# Patient Record
Sex: FEMALE | Race: WHITE | NOT HISPANIC OR LATINO | Employment: OTHER | URBAN - METROPOLITAN AREA
[De-identification: names, ages, dates, MRNs, and addresses within clinical notes are randomized per-mention and may not be internally consistent; named-entity substitution may affect disease eponyms.]

---

## 2017-01-04 ENCOUNTER — HOSPITAL ENCOUNTER (OUTPATIENT)
Dept: CT IMAGING | Facility: HOSPITAL | Age: 82
Discharge: HOME | End: 2017-01-04
Attending: UROLOGY

## 2017-01-04 DIAGNOSIS — N20.0 CALCULUS OF KIDNEY: ICD-10-CM

## 2017-01-23 ENCOUNTER — OFFICE VISIT - HEALTHEAST (OUTPATIENT)
Dept: INTERNAL MEDICINE | Facility: CLINIC | Age: 82
End: 2017-01-23

## 2017-01-23 ENCOUNTER — AMBULATORY - HEALTHEAST (OUTPATIENT)
Dept: INTERNAL MEDICINE | Facility: CLINIC | Age: 82
End: 2017-01-23

## 2017-01-23 DIAGNOSIS — S00.83XA: ICD-10-CM
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
## 2017-01-23 ASSESSMENT — MIFFLIN-ST. JEOR: SCORE: 1295.65

## 2017-02-02 ENCOUNTER — COMMUNICATION - HEALTHEAST (OUTPATIENT)
Dept: INTERNAL MEDICINE | Facility: CLINIC | Age: 82
End: 2017-02-02

## 2017-02-02 ENCOUNTER — RECORDS - HEALTHEAST (OUTPATIENT)
Dept: ADMINISTRATIVE | Facility: OTHER | Age: 82
End: 2017-02-02

## 2017-02-15 ENCOUNTER — RECORDS - HEALTHEAST (OUTPATIENT)
Dept: ADMINISTRATIVE | Facility: OTHER | Age: 82
End: 2017-02-15

## 2017-03-03 ENCOUNTER — COMMUNICATION - HEALTHEAST (OUTPATIENT)
Dept: INTERNAL MEDICINE | Facility: CLINIC | Age: 82
End: 2017-03-03

## 2017-04-03 ENCOUNTER — OFFICE VISIT - HEALTHEAST (OUTPATIENT)
Dept: INTERNAL MEDICINE | Facility: CLINIC | Age: 82
End: 2017-04-03

## 2017-04-03 DIAGNOSIS — N18.4 CKD (CHRONIC KIDNEY DISEASE) STAGE 4, GFR 15-29 ML/MIN (H): ICD-10-CM

## 2017-04-03 DIAGNOSIS — I10 ESSENTIAL HYPERTENSION WITH GOAL BLOOD PRESSURE LESS THAN 140/90: ICD-10-CM

## 2017-04-03 DIAGNOSIS — D64.9 ANEMIA, UNSPECIFIED: ICD-10-CM

## 2017-04-03 ASSESSMENT — MIFFLIN-ST. JEOR: SCORE: 1341.01

## 2017-04-05 ENCOUNTER — COMMUNICATION - HEALTHEAST (OUTPATIENT)
Dept: INTERNAL MEDICINE | Facility: CLINIC | Age: 82
End: 2017-04-05

## 2017-04-13 ENCOUNTER — RECORDS - HEALTHEAST (OUTPATIENT)
Dept: ADMINISTRATIVE | Facility: OTHER | Age: 82
End: 2017-04-13

## 2017-04-19 ENCOUNTER — RECORDS - HEALTHEAST (OUTPATIENT)
Dept: ADMINISTRATIVE | Facility: OTHER | Age: 82
End: 2017-04-19

## 2017-05-01 ENCOUNTER — COMMUNICATION - HEALTHEAST (OUTPATIENT)
Dept: INTERNAL MEDICINE | Facility: CLINIC | Age: 82
End: 2017-05-01

## 2017-07-19 ENCOUNTER — RECORDS - HEALTHEAST (OUTPATIENT)
Dept: ADMINISTRATIVE | Facility: OTHER | Age: 82
End: 2017-07-19

## 2017-07-25 ENCOUNTER — COMMUNICATION - HEALTHEAST (OUTPATIENT)
Dept: SCHEDULING | Facility: CLINIC | Age: 82
End: 2017-07-25

## 2017-07-28 ENCOUNTER — OFFICE VISIT - HEALTHEAST (OUTPATIENT)
Dept: INTERNAL MEDICINE | Facility: CLINIC | Age: 82
End: 2017-07-28

## 2017-07-28 DIAGNOSIS — T14.8XXA HEMATOMA: ICD-10-CM

## 2017-07-28 DIAGNOSIS — S22.39XA RIB FRACTURE: ICD-10-CM

## 2017-07-28 ASSESSMENT — MIFFLIN-ST. JEOR: SCORE: 1310.4

## 2017-08-03 ENCOUNTER — RECORDS - HEALTHEAST (OUTPATIENT)
Dept: ADMINISTRATIVE | Facility: OTHER | Age: 82
End: 2017-08-03

## 2017-08-07 ENCOUNTER — RECORDS - HEALTHEAST (OUTPATIENT)
Dept: ADMINISTRATIVE | Facility: OTHER | Age: 82
End: 2017-08-07

## 2017-10-12 ENCOUNTER — OFFICE VISIT - HEALTHEAST (OUTPATIENT)
Dept: INTERNAL MEDICINE | Facility: CLINIC | Age: 82
End: 2017-10-12

## 2017-10-12 DIAGNOSIS — N18.4 CKD (CHRONIC KIDNEY DISEASE) STAGE 4, GFR 15-29 ML/MIN (H): ICD-10-CM

## 2017-10-12 DIAGNOSIS — N20.0 RENAL STONES: ICD-10-CM

## 2017-10-12 DIAGNOSIS — Z23 NEED FOR VACCINATION: ICD-10-CM

## 2017-10-12 DIAGNOSIS — I10 ESSENTIAL HYPERTENSION: ICD-10-CM

## 2017-10-12 ASSESSMENT — MIFFLIN-ST. JEOR: SCORE: 1296.79

## 2017-10-13 ENCOUNTER — COMMUNICATION - HEALTHEAST (OUTPATIENT)
Dept: INTERNAL MEDICINE | Facility: CLINIC | Age: 82
End: 2017-10-13

## 2017-10-13 DIAGNOSIS — N18.4 CKD (CHRONIC KIDNEY DISEASE) STAGE 4, GFR 15-29 ML/MIN (H): ICD-10-CM

## 2017-10-26 ENCOUNTER — RECORDS - HEALTHEAST (OUTPATIENT)
Dept: ADMINISTRATIVE | Facility: OTHER | Age: 82
End: 2017-10-26

## 2017-11-09 ENCOUNTER — COMMUNICATION - HEALTHEAST (OUTPATIENT)
Dept: INTERNAL MEDICINE | Facility: CLINIC | Age: 82
End: 2017-11-09

## 2017-11-09 DIAGNOSIS — E78.00 PURE HYPERCHOLESTEROLEMIA: ICD-10-CM

## 2017-12-06 ENCOUNTER — RECORDS - HEALTHEAST (OUTPATIENT)
Dept: ADMINISTRATIVE | Facility: OTHER | Age: 82
End: 2017-12-06

## 2017-12-27 ENCOUNTER — OFFICE VISIT - HEALTHEAST (OUTPATIENT)
Dept: INTERNAL MEDICINE | Facility: CLINIC | Age: 82
End: 2017-12-27

## 2017-12-27 ENCOUNTER — COMMUNICATION - HEALTHEAST (OUTPATIENT)
Dept: SCHEDULING | Facility: CLINIC | Age: 82
End: 2017-12-27

## 2017-12-27 DIAGNOSIS — T14.8XXA HEMATOMA: ICD-10-CM

## 2017-12-27 ASSESSMENT — MIFFLIN-ST. JEOR: SCORE: 1314.93

## 2018-01-18 ENCOUNTER — OFFICE VISIT - HEALTHEAST (OUTPATIENT)
Dept: INTERNAL MEDICINE | Facility: CLINIC | Age: 83
End: 2018-01-18

## 2018-01-18 DIAGNOSIS — J06.9 URI (UPPER RESPIRATORY INFECTION): ICD-10-CM

## 2018-01-18 ASSESSMENT — MIFFLIN-ST. JEOR: SCORE: 1305.86

## 2018-02-21 ENCOUNTER — RECORDS - HEALTHEAST (OUTPATIENT)
Dept: ADMINISTRATIVE | Facility: OTHER | Age: 83
End: 2018-02-21

## 2018-02-28 ENCOUNTER — COMMUNICATION - HEALTHEAST (OUTPATIENT)
Dept: INTERNAL MEDICINE | Facility: CLINIC | Age: 83
End: 2018-02-28

## 2018-03-29 ENCOUNTER — OFFICE VISIT - HEALTHEAST (OUTPATIENT)
Dept: INTERNAL MEDICINE | Facility: CLINIC | Age: 83
End: 2018-03-29

## 2018-03-29 DIAGNOSIS — N18.4 CKD (CHRONIC KIDNEY DISEASE) STAGE 4, GFR 15-29 ML/MIN (H): ICD-10-CM

## 2018-03-29 DIAGNOSIS — E78.00 PURE HYPERCHOLESTEROLEMIA: ICD-10-CM

## 2018-03-29 DIAGNOSIS — M79.645 FINGER PAIN, LEFT: ICD-10-CM

## 2018-03-29 DIAGNOSIS — I10 ESSENTIAL HYPERTENSION: ICD-10-CM

## 2018-03-29 LAB
ERYTHROCYTE [DISTWIDTH] IN BLOOD BY AUTOMATED COUNT: 13.2 % (ref 11–14.5)
HCT VFR BLD AUTO: 35.8 % (ref 35–47)
HGB BLD-MCNC: 11.6 G/DL (ref 12–16)
MCH RBC QN AUTO: 29.7 PG (ref 27–34)
MCHC RBC AUTO-ENTMCNC: 32.5 G/DL (ref 32–36)
MCV RBC AUTO: 91 FL (ref 80–100)
PLATELET # BLD AUTO: 211 THOU/UL (ref 140–440)
PMV BLD AUTO: 6.3 FL (ref 7–10)
RBC # BLD AUTO: 3.92 MILL/UL (ref 3.8–5.4)
WBC: 4.6 THOU/UL (ref 4–11)

## 2018-03-29 ASSESSMENT — MIFFLIN-ST. JEOR: SCORE: 1342.15

## 2018-03-30 LAB
ALBUMIN SERPL-MCNC: 3.4 G/DL (ref 3.5–5)
ANION GAP SERPL CALCULATED.3IONS-SCNC: 10 MMOL/L (ref 5–18)
BUN SERPL-MCNC: 31 MG/DL (ref 8–28)
CALCIUM SERPL-MCNC: 8.9 MG/DL (ref 8.5–10.5)
CHLORIDE BLD-SCNC: 109 MMOL/L (ref 98–107)
CO2 SERPL-SCNC: 21 MMOL/L (ref 22–31)
CREAT SERPL-MCNC: 2.17 MG/DL (ref 0.6–1.1)
FERRITIN SERPL-MCNC: 173 NG/ML (ref 10–130)
GFR SERPL CREATININE-BSD FRML MDRD: 22 ML/MIN/1.73M2
GLUCOSE BLD-MCNC: 85 MG/DL (ref 70–125)
IRON SATN MFR SERPL: 20 % (ref 20–50)
IRON SERPL-MCNC: 57 UG/DL (ref 42–175)
PHOSPHATE SERPL-MCNC: 4.1 MG/DL (ref 2.5–4.5)
POTASSIUM BLD-SCNC: 4.9 MMOL/L (ref 3.5–5)
PTH-INTACT SERPL-MCNC: 112 PG/ML (ref 10–86)
SODIUM SERPL-SCNC: 140 MMOL/L (ref 136–145)
TIBC SERPL-MCNC: 284 UG/DL (ref 313–563)
TRANSFERRIN SERPL-MCNC: 227 MG/DL (ref 212–360)

## 2018-04-02 ENCOUNTER — RECORDS - HEALTHEAST (OUTPATIENT)
Dept: ADMINISTRATIVE | Facility: OTHER | Age: 83
End: 2018-04-02

## 2018-04-08 ENCOUNTER — RECORDS - HEALTHEAST (OUTPATIENT)
Dept: ADMINISTRATIVE | Facility: OTHER | Age: 83
End: 2018-04-08

## 2018-04-17 ENCOUNTER — RECORDS - HEALTHEAST (OUTPATIENT)
Dept: ADMINISTRATIVE | Facility: OTHER | Age: 83
End: 2018-04-17

## 2018-05-01 ENCOUNTER — COMMUNICATION - HEALTHEAST (OUTPATIENT)
Dept: INTERNAL MEDICINE | Facility: CLINIC | Age: 83
End: 2018-05-01

## 2018-05-01 DIAGNOSIS — E03.9 HYPOTHYROIDISM: ICD-10-CM

## 2018-05-31 ENCOUNTER — COMMUNICATION - HEALTHEAST (OUTPATIENT)
Dept: INTERNAL MEDICINE | Facility: CLINIC | Age: 83
End: 2018-05-31

## 2018-05-31 DIAGNOSIS — I10 ESSENTIAL HYPERTENSION: ICD-10-CM

## 2018-08-02 ENCOUNTER — COMMUNICATION - HEALTHEAST (OUTPATIENT)
Dept: INTERNAL MEDICINE | Facility: CLINIC | Age: 83
End: 2018-08-02

## 2018-08-02 DIAGNOSIS — E03.9 HYPOTHYROIDISM: ICD-10-CM

## 2018-09-18 ENCOUNTER — RECORDS - HEALTHEAST (OUTPATIENT)
Dept: ADMINISTRATIVE | Facility: OTHER | Age: 83
End: 2018-09-18

## 2018-09-24 ENCOUNTER — RECORDS - HEALTHEAST (OUTPATIENT)
Dept: ADMINISTRATIVE | Facility: OTHER | Age: 83
End: 2018-09-24

## 2018-10-01 ENCOUNTER — AMBULATORY - HEALTHEAST (OUTPATIENT)
Dept: LAB | Facility: CLINIC | Age: 83
End: 2018-10-01

## 2018-10-01 DIAGNOSIS — N18.9 ANEMIA OF CHRONIC RENAL FAILURE: ICD-10-CM

## 2018-10-01 DIAGNOSIS — N13.30 HYDRONEPHROSIS: ICD-10-CM

## 2018-10-01 DIAGNOSIS — N20.0 URIC ACID NEPHROLITHIASIS: ICD-10-CM

## 2018-10-01 DIAGNOSIS — I12.9 PARENCHYMAL RENAL HYPERTENSION: ICD-10-CM

## 2018-10-01 DIAGNOSIS — N18.4 CHRONIC KIDNEY DISEASE, STAGE IV (SEVERE) (H): ICD-10-CM

## 2018-10-01 DIAGNOSIS — D63.1 ANEMIA OF CHRONIC RENAL FAILURE: ICD-10-CM

## 2018-10-01 LAB
ALBUMIN SERPL-MCNC: 3.7 G/DL (ref 3.5–5)
ANION GAP SERPL CALCULATED.3IONS-SCNC: 11 MMOL/L (ref 5–18)
BUN SERPL-MCNC: 35 MG/DL (ref 8–28)
CALCIUM SERPL-MCNC: 9.6 MG/DL (ref 8.5–10.5)
CHLORIDE BLD-SCNC: 112 MMOL/L (ref 98–107)
CO2 SERPL-SCNC: 20 MMOL/L (ref 22–31)
CREAT SERPL-MCNC: 2.16 MG/DL (ref 0.6–1.1)
ERYTHROCYTE [DISTWIDTH] IN BLOOD BY AUTOMATED COUNT: 13 % (ref 11–14.5)
FERRITIN SERPL-MCNC: 256 NG/ML (ref 10–130)
GFR SERPL CREATININE-BSD FRML MDRD: 22 ML/MIN/1.73M2
GLUCOSE BLD-MCNC: 97 MG/DL (ref 70–125)
HCT VFR BLD AUTO: 36.4 % (ref 35–47)
HGB BLD-MCNC: 11.8 G/DL (ref 12–16)
IRON SATN MFR SERPL: 23 % (ref 20–50)
IRON SERPL-MCNC: 59 UG/DL (ref 42–175)
MAGNESIUM SERPL-MCNC: 2.3 MG/DL (ref 1.8–2.6)
MCH RBC QN AUTO: 29.9 PG (ref 27–34)
MCHC RBC AUTO-ENTMCNC: 32.5 G/DL (ref 32–36)
MCV RBC AUTO: 92 FL (ref 80–100)
PHOSPHATE SERPL-MCNC: 4.1 MG/DL (ref 2.5–4.5)
PLATELET # BLD AUTO: 274 THOU/UL (ref 140–440)
PMV BLD AUTO: 6.5 FL (ref 7–10)
POTASSIUM BLD-SCNC: 4.7 MMOL/L (ref 3.5–5)
PTH-INTACT SERPL-MCNC: 132 PG/ML (ref 10–86)
RBC # BLD AUTO: 3.95 MILL/UL (ref 3.8–5.4)
SODIUM SERPL-SCNC: 143 MMOL/L (ref 136–145)
TIBC SERPL-MCNC: 259 UG/DL (ref 313–563)
TRANSFERRIN SERPL-MCNC: 208 MG/DL (ref 212–360)
WBC: 4.4 THOU/UL (ref 4–11)

## 2018-10-09 ENCOUNTER — RECORDS - HEALTHEAST (OUTPATIENT)
Dept: ADMINISTRATIVE | Facility: OTHER | Age: 83
End: 2018-10-09

## 2018-10-19 ENCOUNTER — COMMUNICATION - HEALTHEAST (OUTPATIENT)
Dept: INTERNAL MEDICINE | Facility: CLINIC | Age: 83
End: 2018-10-19

## 2018-10-19 DIAGNOSIS — E03.9 HYPOTHYROIDISM: ICD-10-CM

## 2018-11-12 ENCOUNTER — OFFICE VISIT - HEALTHEAST (OUTPATIENT)
Dept: INTERNAL MEDICINE | Facility: CLINIC | Age: 83
End: 2018-11-12

## 2018-11-12 ENCOUNTER — COMMUNICATION - HEALTHEAST (OUTPATIENT)
Dept: INTERNAL MEDICINE | Facility: CLINIC | Age: 83
End: 2018-11-12

## 2018-11-12 DIAGNOSIS — M25.511 ACUTE PAIN OF RIGHT SHOULDER: ICD-10-CM

## 2018-11-12 ASSESSMENT — MIFFLIN-ST. JEOR: SCORE: 1283.18

## 2018-11-19 ENCOUNTER — COMMUNICATION - HEALTHEAST (OUTPATIENT)
Dept: INTERNAL MEDICINE | Facility: CLINIC | Age: 83
End: 2018-11-19

## 2018-11-26 ENCOUNTER — OFFICE VISIT - HEALTHEAST (OUTPATIENT)
Dept: INTERNAL MEDICINE | Facility: CLINIC | Age: 83
End: 2018-11-26

## 2018-11-26 ENCOUNTER — HOSPITAL ENCOUNTER (OUTPATIENT)
Dept: ULTRASOUND IMAGING | Facility: CLINIC | Age: 83
Discharge: HOME | End: 2018-11-26
Attending: INTERNAL MEDICINE

## 2018-11-26 DIAGNOSIS — R60.0 LEG EDEMA, RIGHT: ICD-10-CM

## 2018-11-26 DIAGNOSIS — M25.511 RIGHT SHOULDER PAIN, UNSPECIFIED CHRONICITY: ICD-10-CM

## 2018-11-26 DIAGNOSIS — Z23 NEED FOR IMMUNIZATION AGAINST INFLUENZA: ICD-10-CM

## 2018-11-26 ASSESSMENT — MIFFLIN-ST. JEOR: SCORE: 1283.18

## 2019-01-16 ENCOUNTER — COMMUNICATION - HEALTHEAST (OUTPATIENT)
Dept: INTERNAL MEDICINE | Facility: CLINIC | Age: 84
End: 2019-01-16

## 2019-01-16 DIAGNOSIS — E03.9 HYPOTHYROIDISM: ICD-10-CM

## 2019-03-14 ENCOUNTER — COMMUNICATION - HEALTHEAST (OUTPATIENT)
Dept: INTERNAL MEDICINE | Facility: CLINIC | Age: 84
End: 2019-03-14

## 2019-03-14 DIAGNOSIS — I10 ESSENTIAL HYPERTENSION: ICD-10-CM

## 2019-03-18 ENCOUNTER — AMBULATORY - HEALTHEAST (OUTPATIENT)
Dept: LAB | Facility: CLINIC | Age: 84
End: 2019-03-18

## 2019-03-18 DIAGNOSIS — N20.0 RENAL STONES: ICD-10-CM

## 2019-03-18 DIAGNOSIS — N25.81 SECONDARY HYPERPARATHYROIDISM OF RENAL ORIGIN (H): ICD-10-CM

## 2019-03-18 DIAGNOSIS — N18.4 CKD (CHRONIC KIDNEY DISEASE) STAGE 4, GFR 15-29 ML/MIN (H): ICD-10-CM

## 2019-03-18 DIAGNOSIS — D64.9 ANEMIA, UNSPECIFIED: ICD-10-CM

## 2019-04-17 ENCOUNTER — COMMUNICATION - HEALTHEAST (OUTPATIENT)
Dept: INTERNAL MEDICINE | Facility: CLINIC | Age: 84
End: 2019-04-17

## 2019-04-17 ENCOUNTER — RECORDS - HEALTHEAST (OUTPATIENT)
Dept: ADMINISTRATIVE | Facility: OTHER | Age: 84
End: 2019-04-17

## 2019-04-17 DIAGNOSIS — E78.00 PURE HYPERCHOLESTEROLEMIA: ICD-10-CM

## 2019-04-17 DIAGNOSIS — I10 ESSENTIAL HYPERTENSION: ICD-10-CM

## 2019-04-17 DIAGNOSIS — M79.645 FINGER PAIN, LEFT: ICD-10-CM

## 2019-04-17 DIAGNOSIS — N18.4 CKD (CHRONIC KIDNEY DISEASE) STAGE 4, GFR 15-29 ML/MIN (H): ICD-10-CM

## 2019-04-29 ENCOUNTER — COMMUNICATION - HEALTHEAST (OUTPATIENT)
Dept: INTERNAL MEDICINE | Facility: CLINIC | Age: 84
End: 2019-04-29

## 2019-04-29 DIAGNOSIS — E03.9 HYPOTHYROIDISM: ICD-10-CM

## 2019-07-01 ENCOUNTER — OFFICE VISIT - HEALTHEAST (OUTPATIENT)
Dept: INTERNAL MEDICINE | Facility: CLINIC | Age: 84
End: 2019-07-01

## 2019-07-01 DIAGNOSIS — M15.9 OSTEOARTHRITIS OF MULTIPLE JOINTS, UNSPECIFIED OSTEOARTHRITIS TYPE: ICD-10-CM

## 2019-07-01 DIAGNOSIS — N18.4 CKD (CHRONIC KIDNEY DISEASE) STAGE 4, GFR 15-29 ML/MIN (H): ICD-10-CM

## 2019-07-01 DIAGNOSIS — I10 ESSENTIAL HYPERTENSION: ICD-10-CM

## 2019-07-01 ASSESSMENT — MIFFLIN-ST. JEOR: SCORE: 1269.57

## 2019-08-05 ENCOUNTER — COMMUNICATION - HEALTHEAST (OUTPATIENT)
Dept: INTERNAL MEDICINE | Facility: CLINIC | Age: 84
End: 2019-08-05

## 2019-08-05 DIAGNOSIS — N18.4 CKD (CHRONIC KIDNEY DISEASE) STAGE 4, GFR 15-29 ML/MIN (H): ICD-10-CM

## 2019-08-05 DIAGNOSIS — M79.645 FINGER PAIN, LEFT: ICD-10-CM

## 2019-08-05 DIAGNOSIS — E78.00 PURE HYPERCHOLESTEROLEMIA: ICD-10-CM

## 2019-08-05 DIAGNOSIS — I10 ESSENTIAL HYPERTENSION: ICD-10-CM

## 2019-09-09 ENCOUNTER — RECORDS - HEALTHEAST (OUTPATIENT)
Dept: ADMINISTRATIVE | Facility: OTHER | Age: 84
End: 2019-09-09

## 2019-10-31 ENCOUNTER — RECORDS - HEALTHEAST (OUTPATIENT)
Dept: ADMINISTRATIVE | Facility: OTHER | Age: 84
End: 2019-10-31

## 2020-03-26 ENCOUNTER — RECORDS - HEALTHEAST (OUTPATIENT)
Dept: ADMINISTRATIVE | Facility: OTHER | Age: 85
End: 2020-03-26

## 2020-04-20 ENCOUNTER — COMMUNICATION - HEALTHEAST (OUTPATIENT)
Dept: INTERNAL MEDICINE | Facility: CLINIC | Age: 85
End: 2020-04-20

## 2020-04-20 ENCOUNTER — OFFICE VISIT - HEALTHEAST (OUTPATIENT)
Dept: INTERNAL MEDICINE | Facility: CLINIC | Age: 85
End: 2020-04-20

## 2020-04-20 DIAGNOSIS — N18.4 CKD (CHRONIC KIDNEY DISEASE) STAGE 4, GFR 15-29 ML/MIN (H): ICD-10-CM

## 2020-04-20 DIAGNOSIS — I10 ESSENTIAL HYPERTENSION: ICD-10-CM
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
## 2020-04-21 ENCOUNTER — COMMUNICATION - HEALTHEAST (OUTPATIENT)
Dept: SCHEDULING | Facility: CLINIC | Age: 85
End: 2020-04-21

## 2020-04-29 ENCOUNTER — RECORDS - HEALTHEAST (OUTPATIENT)
Dept: ADMINISTRATIVE | Facility: OTHER | Age: 85
End: 2020-04-29

## 2020-05-08 ENCOUNTER — RECORDS - HEALTHEAST (OUTPATIENT)
Dept: ADMINISTRATIVE | Facility: OTHER | Age: 85
End: 2020-05-08

## 2020-05-13 ENCOUNTER — RECORDS - HEALTHEAST (OUTPATIENT)
Dept: ADMINISTRATIVE | Facility: OTHER | Age: 85
End: 2020-05-13

## 2020-06-10 ENCOUNTER — RECORDS - HEALTHEAST (OUTPATIENT)
Dept: ADMINISTRATIVE | Facility: OTHER | Age: 85
End: 2020-06-10

## 2020-06-11 ENCOUNTER — RECORDS - HEALTHEAST (OUTPATIENT)
Dept: ADMINISTRATIVE | Facility: OTHER | Age: 85
End: 2020-06-11

## 2020-07-14 ENCOUNTER — COMMUNICATION - HEALTHEAST (OUTPATIENT)
Dept: INTERNAL MEDICINE | Facility: CLINIC | Age: 85
End: 2020-07-14

## 2020-07-14 DIAGNOSIS — E03.9 HYPOTHYROIDISM: ICD-10-CM

## 2020-08-10 ENCOUNTER — OFFICE VISIT - HEALTHEAST (OUTPATIENT)
Dept: INTERNAL MEDICINE | Facility: CLINIC | Age: 85
End: 2020-08-10

## 2020-08-10 DIAGNOSIS — N18.4 CKD (CHRONIC KIDNEY DISEASE) STAGE 4, GFR 15-29 ML/MIN (H): ICD-10-CM

## 2020-08-10 DIAGNOSIS — M19.90 OSTEOARTHRITIS, UNSPECIFIED OSTEOARTHRITIS TYPE, UNSPECIFIED SITE: ICD-10-CM

## 2020-08-10 DIAGNOSIS — E03.9 HYPOTHYROIDISM, UNSPECIFIED TYPE: ICD-10-CM

## 2020-08-10 DIAGNOSIS — I10 ESSENTIAL HYPERTENSION: ICD-10-CM

## 2020-08-10 DIAGNOSIS — E78.00 PURE HYPERCHOLESTEROLEMIA: ICD-10-CM

## 2020-08-13 ENCOUNTER — AMBULATORY - HEALTHEAST (OUTPATIENT)
Dept: LAB | Facility: CLINIC | Age: 85
End: 2020-08-13

## 2020-08-13 DIAGNOSIS — N18.9 ANEMIA IN CHRONIC KIDNEY DISEASE: ICD-10-CM

## 2020-08-13 DIAGNOSIS — D63.1 ANEMIA IN CHRONIC KIDNEY DISEASE: ICD-10-CM

## 2020-08-13 DIAGNOSIS — N25.81 SECONDARY HYPERPARATHYROIDISM OF RENAL ORIGIN (H): ICD-10-CM

## 2020-08-13 DIAGNOSIS — I12.9 HYPERTENSIVE KIDNEY DISEASE WITH CHRONIC KIDNEY DISEASE STAGE IV (H): ICD-10-CM

## 2020-08-13 DIAGNOSIS — N18.4 CHRONIC KIDNEY DISEASE, STAGE IV (SEVERE) (H): ICD-10-CM

## 2020-08-13 DIAGNOSIS — N18.4 HYPERTENSIVE KIDNEY DISEASE WITH CHRONIC KIDNEY DISEASE STAGE IV (H): ICD-10-CM

## 2020-08-17 ENCOUNTER — COMMUNICATION - HEALTHEAST (OUTPATIENT)
Dept: INTERNAL MEDICINE | Facility: CLINIC | Age: 85
End: 2020-08-17

## 2020-08-17 DIAGNOSIS — I10 ESSENTIAL HYPERTENSION: ICD-10-CM

## 2020-08-17 DIAGNOSIS — E78.00 PURE HYPERCHOLESTEROLEMIA: ICD-10-CM

## 2020-08-17 DIAGNOSIS — N18.4 CKD (CHRONIC KIDNEY DISEASE) STAGE 4, GFR 15-29 ML/MIN (H): ICD-10-CM

## 2020-08-17 DIAGNOSIS — M79.645 FINGER PAIN, LEFT: ICD-10-CM

## 2020-09-15 ENCOUNTER — AMBULATORY - HEALTHEAST (OUTPATIENT)
Dept: LAB | Facility: CLINIC | Age: 85
End: 2020-09-15

## 2020-09-15 ENCOUNTER — AMBULATORY - HEALTHEAST (OUTPATIENT)
Dept: NURSING | Facility: CLINIC | Age: 85
End: 2020-09-15

## 2020-09-15 DIAGNOSIS — I12.9 HYPERTENSIVE KIDNEY DISEASE WITH CHRONIC KIDNEY DISEASE STAGE IV (H): ICD-10-CM

## 2020-09-15 DIAGNOSIS — N18.4 HYPERTENSIVE KIDNEY DISEASE WITH CHRONIC KIDNEY DISEASE STAGE IV (H): ICD-10-CM

## 2020-09-15 DIAGNOSIS — D63.1 ANEMIA IN CHRONIC KIDNEY DISEASE: ICD-10-CM

## 2020-09-15 DIAGNOSIS — N18.4 CHRONIC KIDNEY DISEASE, STAGE IV (SEVERE) (H): ICD-10-CM

## 2020-09-15 DIAGNOSIS — N18.9 ANEMIA IN CHRONIC KIDNEY DISEASE: ICD-10-CM

## 2020-09-15 DIAGNOSIS — N25.81 SECONDARY HYPERPARATHYROIDISM OF RENAL ORIGIN (H): ICD-10-CM

## 2020-09-15 LAB
ALBUMIN SERPL-MCNC: 3.5 G/DL (ref 3.5–5)
ANION GAP SERPL CALCULATED.3IONS-SCNC: 14 MMOL/L (ref 5–18)
BUN SERPL-MCNC: 38 MG/DL (ref 8–28)
CALCIUM SERPL-MCNC: 8.4 MG/DL (ref 8.5–10.5)
CHLORIDE BLD-SCNC: 105 MMOL/L (ref 98–107)
CO2 SERPL-SCNC: 20 MMOL/L (ref 22–31)
CREAT SERPL-MCNC: 2.15 MG/DL (ref 0.6–1.1)
CREAT UR-MCNC: 109.6 MG/DL
ERYTHROCYTE [DISTWIDTH] IN BLOOD BY AUTOMATED COUNT: 12.4 % (ref 11–14.5)
FERRITIN SERPL-MCNC: 497 NG/ML (ref 10–130)
GFR SERPL CREATININE-BSD FRML MDRD: 22 ML/MIN/1.73M2
GLUCOSE BLD-MCNC: 106 MG/DL (ref 70–125)
HCT VFR BLD AUTO: 34.6 % (ref 35–47)
HGB BLD-MCNC: 11.6 G/DL (ref 12–16)
MCH RBC QN AUTO: 30 PG (ref 27–34)
MCHC RBC AUTO-ENTMCNC: 33.4 G/DL (ref 32–36)
MCV RBC AUTO: 90 FL (ref 80–100)
PHOSPHATE SERPL-MCNC: 3.3 MG/DL (ref 2.5–4.5)
PLATELET # BLD AUTO: 226 THOU/UL (ref 140–440)
PMV BLD AUTO: 6.3 FL (ref 7–10)
POTASSIUM BLD-SCNC: 4.3 MMOL/L (ref 3.5–5)
PROTEIN, RANDOM URINE - HISTORICAL: 63 MG/DL
PROTEIN/CREAT RATIO, RANDOM UR: 0.57
PTH-INTACT SERPL-MCNC: 94 PG/ML (ref 10–86)
RBC # BLD AUTO: 3.86 MILL/UL (ref 3.8–5.4)
SODIUM SERPL-SCNC: 139 MMOL/L (ref 136–145)
WBC: 5.4 THOU/UL (ref 4–11)

## 2020-09-16 LAB — 25(OH)D3 SERPL-MCNC: 32 NG/ML (ref 30–80)

## 2020-10-27 ENCOUNTER — OFFICE VISIT - HEALTHEAST (OUTPATIENT)
Dept: INTERNAL MEDICINE | Facility: CLINIC | Age: 85
End: 2020-10-27

## 2020-10-27 DIAGNOSIS — N18.4 CKD (CHRONIC KIDNEY DISEASE) STAGE 4, GFR 15-29 ML/MIN (H): ICD-10-CM

## 2020-10-27 DIAGNOSIS — E78.00 PURE HYPERCHOLESTEROLEMIA: ICD-10-CM

## 2020-10-27 DIAGNOSIS — N39.0 URINARY TRACT INFECTION WITHOUT HEMATURIA, SITE UNSPECIFIED: ICD-10-CM

## 2020-10-27 DIAGNOSIS — E03.9 HYPOTHYROIDISM: ICD-10-CM

## 2020-10-27 DIAGNOSIS — R42 DIZZINESS: ICD-10-CM

## 2020-10-27 DIAGNOSIS — I10 ESSENTIAL HYPERTENSION: ICD-10-CM

## 2020-10-27 LAB
ALBUMIN SERPL-MCNC: 3.8 G/DL (ref 3.5–5)
ALBUMIN UR-MCNC: ABNORMAL MG/DL
ALP SERPL-CCNC: 93 U/L (ref 45–120)
ALT SERPL W P-5'-P-CCNC: <9 U/L (ref 0–45)
ANION GAP SERPL CALCULATED.3IONS-SCNC: 11 MMOL/L (ref 5–18)
APPEARANCE UR: ABNORMAL
AST SERPL W P-5'-P-CCNC: 15 U/L (ref 0–40)
BACTERIA #/AREA URNS HPF: ABNORMAL HPF
BILIRUB SERPL-MCNC: 0.3 MG/DL (ref 0–1)
BILIRUB UR QL STRIP: NEGATIVE
BUN SERPL-MCNC: 28 MG/DL (ref 8–28)
CALCIUM SERPL-MCNC: 9.6 MG/DL (ref 8.5–10.5)
CHLORIDE BLD-SCNC: 108 MMOL/L (ref 98–107)
CO2 SERPL-SCNC: 25 MMOL/L (ref 22–31)
COLOR UR AUTO: YELLOW
CREAT SERPL-MCNC: 1.82 MG/DL (ref 0.6–1.1)
ERYTHROCYTE [DISTWIDTH] IN BLOOD BY AUTOMATED COUNT: 13.2 % (ref 11–14.5)
GFR SERPL CREATININE-BSD FRML MDRD: 26 ML/MIN/1.73M2
GLUCOSE BLD-MCNC: 97 MG/DL (ref 70–125)
GLUCOSE UR STRIP-MCNC: NEGATIVE MG/DL
HCT VFR BLD AUTO: 35.7 % (ref 35–47)
HGB BLD-MCNC: 11.6 G/DL (ref 12–16)
HGB UR QL STRIP: ABNORMAL
KETONES UR STRIP-MCNC: NEGATIVE MG/DL
LEUKOCYTE ESTERASE UR QL STRIP: ABNORMAL
MCH RBC QN AUTO: 29.6 PG (ref 27–34)
MCHC RBC AUTO-ENTMCNC: 32.4 G/DL (ref 32–36)
MCV RBC AUTO: 91 FL (ref 80–100)
NITRATE UR QL: NEGATIVE
PH UR STRIP: 6.5 [PH] (ref 5–8)
PLATELET # BLD AUTO: 320 THOU/UL (ref 140–440)
PMV BLD AUTO: 6.1 FL (ref 7–10)
POTASSIUM BLD-SCNC: 5.1 MMOL/L (ref 3.5–5)
PROT SERPL-MCNC: 7.1 G/DL (ref 6–8)
RBC # BLD AUTO: 3.91 MILL/UL (ref 3.8–5.4)
RBC #/AREA URNS AUTO: ABNORMAL HPF
SODIUM SERPL-SCNC: 144 MMOL/L (ref 136–145)
SP GR UR STRIP: 1.02 (ref 1–1.03)
SQUAMOUS #/AREA URNS AUTO: ABNORMAL LPF
TSH SERPL DL<=0.005 MIU/L-ACNC: 3.82 UIU/ML (ref 0.3–5)
UROBILINOGEN UR STRIP-ACNC: ABNORMAL
WBC #/AREA URNS AUTO: ABNORMAL HPF
WBC: 4.5 THOU/UL (ref 4–11)

## 2020-10-27 RX ORDER — LEVOTHYROXINE SODIUM 100 UG/1
TABLET ORAL
Qty: 90 TABLET | Refills: 2 | Status: SHIPPED | OUTPATIENT
Start: 2020-10-27 | End: 2022-05-09

## 2020-10-27 RX ORDER — SIMVASTATIN 20 MG
TABLET ORAL
Qty: 90 TABLET | Refills: 3 | Status: SHIPPED | OUTPATIENT
Start: 2020-10-27 | End: 2022-01-31

## 2020-10-27 RX ORDER — AMLODIPINE BESYLATE 2.5 MG/1
2.5 TABLET ORAL DAILY
Qty: 90 TABLET | Refills: 3 | Status: SHIPPED | OUTPATIENT
Start: 2020-10-27 | End: 2022-05-09

## 2020-10-27 ASSESSMENT — MIFFLIN-ST. JEOR: SCORE: 1138.03

## 2020-10-28 ENCOUNTER — COMMUNICATION - HEALTHEAST (OUTPATIENT)
Dept: INTERNAL MEDICINE | Facility: CLINIC | Age: 85
End: 2020-10-28

## 2020-10-29 ENCOUNTER — COMMUNICATION - HEALTHEAST (OUTPATIENT)
Dept: INTERNAL MEDICINE | Facility: CLINIC | Age: 85
End: 2020-10-29

## 2020-10-29 LAB — BACTERIA SPEC CULT: ABNORMAL

## 2020-12-14 ENCOUNTER — OFFICE VISIT - HEALTHEAST (OUTPATIENT)
Dept: INTERNAL MEDICINE | Facility: CLINIC | Age: 85
End: 2020-12-14

## 2020-12-14 DIAGNOSIS — N18.4 CKD (CHRONIC KIDNEY DISEASE) STAGE 4, GFR 15-29 ML/MIN (H): ICD-10-CM

## 2020-12-14 DIAGNOSIS — I10 ESSENTIAL HYPERTENSION: ICD-10-CM

## 2020-12-14 DIAGNOSIS — E03.9 HYPOTHYROIDISM, UNSPECIFIED TYPE: ICD-10-CM

## 2020-12-14 ASSESSMENT — MIFFLIN-ST. JEOR: SCORE: 1172.96

## 2021-03-08 ENCOUNTER — AMBULATORY - HEALTHEAST (OUTPATIENT)
Dept: NURSING | Facility: CLINIC | Age: 86
End: 2021-03-08

## 2021-03-29 ENCOUNTER — AMBULATORY - HEALTHEAST (OUTPATIENT)
Dept: NURSING | Facility: CLINIC | Age: 86
End: 2021-03-29

## 2021-05-27 ENCOUNTER — RECORDS - HEALTHEAST (OUTPATIENT)
Dept: ADMINISTRATIVE | Facility: CLINIC | Age: 86
End: 2021-05-27

## 2021-05-28 ENCOUNTER — RECORDS - HEALTHEAST (OUTPATIENT)
Dept: ADMINISTRATIVE | Facility: CLINIC | Age: 86
End: 2021-05-28

## 2021-05-28 NOTE — TELEPHONE ENCOUNTER
Refill Approved    Rx renewed per Medication Renewal Policy. Medication was last renewed on 3/29/18 for 90/3.  Last OV 11/26/2018    Miriam Katz, [facility] Triage/Med Refill 4/19/2019     Requested Prescriptions   Pending Prescriptions Disp Refills     simvastatin (ZOCOR) 20 MG tablet [Pharmacy Med Name: SIMVASTATIN 20MG TABLETS] 90 tablet 0     Sig: TAKE 1 TABLET(20 MG) BY MOUTH AT BEDTIME       Statins Refill Protocol (Hmg CoA Reductase Inhibitors) Passed - 4/17/2019  3:06 PM        Passed - PCP or prescribing provider visit in past 12 months      Last office visit with prescriber/PCP: 11/26/2018 Kurtis Fontaine MD OR same dept: 11/26/2018 Kurtis Fontaine MD OR same specialty: 11/26/2018 Kurtis Fontaine MD  Last physical: 2/24/2016 Last MTM visit: Visit date not found   Next visit within 3 mo: Visit date not found  Next physical within 3 mo: Visit date not found  Prescriber OR PCP: Kurtis Fontaine MD  Last diagnosis associated with med order: 1. Hypercholesterolemia  - simvastatin (ZOCOR) 20 MG tablet [Pharmacy Med Name: SIMVASTATIN 20MG TABLETS]; TAKE 1 TABLET(20 MG) BY MOUTH AT BEDTIME  Dispense: 90 tablet; Refill: 0    2. Essential hypertension  - simvastatin (ZOCOR) 20 MG tablet [Pharmacy Med Name: SIMVASTATIN 20MG TABLETS]; TAKE 1 TABLET(20 MG) BY MOUTH AT BEDTIME  Dispense: 90 tablet; Refill: 0    3. CKD (chronic kidney disease) stage 4, GFR 15-29 ml/min (H)  - simvastatin (ZOCOR) 20 MG tablet [Pharmacy Med Name: SIMVASTATIN 20MG TABLETS]; TAKE 1 TABLET(20 MG) BY MOUTH AT BEDTIME  Dispense: 90 tablet; Refill: 0    4. Finger pain, left  - simvastatin (ZOCOR) 20 MG tablet [Pharmacy Med Name: SIMVASTATIN 20MG TABLETS]; TAKE 1 TABLET(20 MG) BY MOUTH AT BEDTIME  Dispense: 90 tablet; Refill: 0    If protocol passes may refill for 12 months if within 3 months of last provider visit (or a total of 15 months).

## 2021-05-28 NOTE — TELEPHONE ENCOUNTER
RN cannot approve Refill Request    RN can NOT refill this medication PCP messaged that patient is overdue for Labs.       Inessa Sosa, [facility] Triage/Med Refill 4/30/2019    Requested Prescriptions   Pending Prescriptions Disp Refills     levothyroxine (SYNTHROID, LEVOTHROID) 100 MCG tablet [Pharmacy Med Name: LEVOTHYROXINE 0.100MG (100MCG) TAB] 90 tablet 3     Sig: TAKE 1 TABLET(100 MCG) BY MOUTH DAILY       Thyroid Hormones Protocol Failed - 4/29/2019 10:24 AM        Failed - TSH on file in past 12 months for patient age 12 & older     TSH   Date Value Ref Range Status   12/01/2014 1.62 0.30 - 5.05 uIU/mL Final                   Passed - Provider visit in past 12 months or next 3 months     Last office visit with prescriber/PCP: 11/26/2018 Kurtis Fontaine MD OR same dept: 11/26/2018 Kurtis Fontaine MD OR same specialty: 11/26/2018 Kurtis Fontaine MD  Last physical: 2/24/2016 Last MTM visit: Visit date not found   Next visit within 3 mo: Visit date not found  Next physical within 3 mo: Visit date not found  Prescriber OR PCP: Kurtis Fontaine MD  Last diagnosis associated with med order: 1. Hypothyroidism  - levothyroxine (SYNTHROID, LEVOTHROID) 100 MCG tablet [Pharmacy Med Name: LEVOTHYROXINE 0.100MG (100MCG) TAB]; TAKE 1 TABLET(100 MCG) BY MOUTH DAILY  Dispense: 90 tablet; Refill: 0    If protocol passes may refill for 12 months if within 3 months of last provider visit (or a total of 15 months).

## 2021-05-30 ENCOUNTER — RECORDS - HEALTHEAST (OUTPATIENT)
Dept: ADMINISTRATIVE | Facility: CLINIC | Age: 86
End: 2021-05-30

## 2021-05-30 VITALS — HEIGHT: 64 IN | WEIGHT: 197 LBS | BODY MASS INDEX: 33.63 KG/M2

## 2021-05-30 VITALS — WEIGHT: 207 LBS | BODY MASS INDEX: 35.34 KG/M2 | HEIGHT: 64 IN

## 2021-05-30 NOTE — PROGRESS NOTES
[facility] Follow Up Note    Lindsey Bermudez   87 y.o. female    Date of Visit: 7/1/2019    Chief Complaint   Patient presents with     Follow-up     Shoulder Injury     still having finger issue from last falls fall     Subjective  This is an 87-year-old lady who comes in primarily because of worsening joint pains.  She has had long-standing degenerative arthritis but it does seem to be getting worse since fall that she took last autumn.  She has issues in shoulders and hands and fingers as well as her knees.  Interestingly, she had been doing pool therapy a year ago and was actually doing pretty well but for a number of reasons stopped doing it.  They have contacted her about resuming it and it might very well be a good idea.  She has hypertension which is been stable with no headaches or dizziness and no chest pain or shortness of breath.  She also has chronic renal failure and does see the nephrologist on a regular basis.  I reviewed her most recent notes from a couple of months ago.  She reports no other new issues.    ROS A comprehensive review of systems was performed and was otherwise negative    Medications, allergies, and problem list were reviewed and updated    Exam  General Appearance:   On examination her blood pressure is 132/70.  Weight is 193 pounds and height is 63 inches.  BMI is 34.19.    Lungs are clear.    Heart is in a sinus rhythm with a rate of 88 no ectopy.    No new edema.    Limited range of motion in both shoulders.  No significant swelling over either hand.    Gait is somewhat handicapped by her arthritis and she is using a cane but still has difficulty walking.    The patient is alert and oriented x3.      Assessment/Plan  1. Osteoarthritis of multiple joints, unspecified osteoarthritis type     2. Essential hypertension     3. CKD (chronic kidney disease) stage 4, GFR 15-29 ml/min (H)       Multiple joint pains due to degenerative arthritis.  I wrote a note  to have her resume the pool therapy to see if this would help before we do anything more aggressive.    Hypertension.  Stable.  Continue current medication.    Chronic renal failure.  Continue follow-up with nephrology.    I would like to see her back in 4 months.  The following high BMI interventions were performed this visit: weight monitoring    Kurtis Fontaine MD      Current Outpatient Medications on File Prior to Visit   Medication Sig     acetaminophen (TYLENOL) 325 MG tablet Take 325-650 mg by mouth every 6 (six) hours as needed for pain.     amLODIPine (NORVASC) 2.5 MG tablet Take 2.5 mg by mouth daily.     calcium citrate-vitamin D (CITRACAL+D) 315-200 mg-unit per tablet Take 1 tablet by mouth 2 (two) times a day.     levothyroxine (SYNTHROID, LEVOTHROID) 100 MCG tablet TAKE 1 TABLET(100 MCG) BY MOUTH DAILY     magnesium oxide 250 mg Tab Take 1 tablet (250 mg total) by mouth at bedtime.     simvastatin (ZOCOR) 20 MG tablet TAKE 1 TABLET(20 MG) BY MOUTH AT BEDTIME     solifenacin (VESICARE) 10 MG tablet Take 10 mg by mouth daily.     tolterodine (DETROL LA) 4 MG ER capsule Take 4 mg by mouth daily.     hydrocortisone 1 % ointment Apply 1 application topically 2 (two) times a day as needed. Apply and rub in a thin film to affected areas as needed, for rash/itching     [DISCONTINUED] losartan (COZAAR) 25 MG tablet Take 1 tablet (25 mg total) by mouth daily.     No current facility-administered medications on file prior to visit.      Allergies   Allergen Reactions     Adhesive Tape-Silicones      Blisters with some bandage like products     Social History     Tobacco Use     Smoking status: Never Smoker     Smokeless tobacco: Never Used   Substance Use Topics     Alcohol use: Yes     Comment: rare     Drug use: No

## 2021-05-31 VITALS — HEIGHT: 63 IN | BODY MASS INDEX: 35.79 KG/M2 | WEIGHT: 202 LBS

## 2021-05-31 VITALS — WEIGHT: 203 LBS | BODY MASS INDEX: 35.97 KG/M2 | HEIGHT: 63 IN

## 2021-05-31 VITALS — HEIGHT: 63 IN | WEIGHT: 201 LBS | BODY MASS INDEX: 35.61 KG/M2

## 2021-05-31 VITALS — HEIGHT: 63 IN | WEIGHT: 199 LBS | BODY MASS INDEX: 35.26 KG/M2

## 2021-05-31 NOTE — TELEPHONE ENCOUNTER
Refill Approved    Rx renewed per Medication Renewal Policy. Medication was last renewed on 4/19/19    Maura Chappell, [facility] Triage/Med Refill 8/6/2019     Requested Prescriptions   Pending Prescriptions Disp Refills     simvastatin (ZOCOR) 20 MG tablet [Pharmacy Med Name: SIMVASTATIN 20MG TABLETS] 90 tablet 0     Sig: TAKE 1 TABLET(20 MG) BY MOUTH AT BEDTIME       Statins Refill Protocol (Hmg CoA Reductase Inhibitors) Passed - 8/5/2019 11:35 AM        Passed - PCP or prescribing provider visit in past 12 months      Last office visit with prescriber/PCP: 7/1/2019 Kurtis Fontaine MD OR same dept: 7/1/2019 Kurtis Fontaine MD OR same specialty: 7/1/2019 Kurtis Fontaine MD  Last physical: 2/24/2016 Last MTM visit: Visit date not found   Next visit within 3 mo: Visit date not found  Next physical within 3 mo: Visit date not found  Prescriber OR PCP: Kurtis Fontaine MD  Last diagnosis associated with med order: 1. Hypercholesterolemia  - simvastatin (ZOCOR) 20 MG tablet [Pharmacy Med Name: SIMVASTATIN 20MG TABLETS]; TAKE 1 TABLET(20 MG) BY MOUTH AT BEDTIME  Dispense: 90 tablet; Refill: 0    2. Essential hypertension  - simvastatin (ZOCOR) 20 MG tablet [Pharmacy Med Name: SIMVASTATIN 20MG TABLETS]; TAKE 1 TABLET(20 MG) BY MOUTH AT BEDTIME  Dispense: 90 tablet; Refill: 0    3. CKD (chronic kidney disease) stage 4, GFR 15-29 ml/min (H)  - simvastatin (ZOCOR) 20 MG tablet [Pharmacy Med Name: SIMVASTATIN 20MG TABLETS]; TAKE 1 TABLET(20 MG) BY MOUTH AT BEDTIME  Dispense: 90 tablet; Refill: 0    4. Finger pain, left  - simvastatin (ZOCOR) 20 MG tablet [Pharmacy Med Name: SIMVASTATIN 20MG TABLETS]; TAKE 1 TABLET(20 MG) BY MOUTH AT BEDTIME  Dispense: 90 tablet; Refill: 0    If protocol passes may refill for 12 months if within 3 months of last provider visit (or a total of 15 months).

## 2021-06-01 VITALS — BODY MASS INDEX: 37.03 KG/M2 | HEIGHT: 63 IN | WEIGHT: 209 LBS

## 2021-06-02 VITALS — HEIGHT: 63 IN | WEIGHT: 196 LBS | BODY MASS INDEX: 34.73 KG/M2

## 2021-06-02 VITALS — BODY MASS INDEX: 34.73 KG/M2 | WEIGHT: 196 LBS | HEIGHT: 63 IN

## 2021-06-03 VITALS — HEIGHT: 63 IN | BODY MASS INDEX: 34.2 KG/M2 | WEIGHT: 193 LBS

## 2021-06-04 VITALS
OXYGEN SATURATION: 95 % | SYSTOLIC BLOOD PRESSURE: 124 MMHG | WEIGHT: 171 LBS | BODY MASS INDEX: 30.29 KG/M2 | HEART RATE: 88 BPM | DIASTOLIC BLOOD PRESSURE: 70 MMHG

## 2021-06-04 VITALS
SYSTOLIC BLOOD PRESSURE: 150 MMHG | BODY MASS INDEX: 29.06 KG/M2 | HEIGHT: 63 IN | DIASTOLIC BLOOD PRESSURE: 100 MMHG | TEMPERATURE: 97.2 F | WEIGHT: 164 LBS | OXYGEN SATURATION: 98 % | HEART RATE: 94 BPM

## 2021-06-04 VITALS
DIASTOLIC BLOOD PRESSURE: 78 MMHG | WEIGHT: 165.25 LBS | HEART RATE: 90 BPM | BODY MASS INDEX: 29.27 KG/M2 | SYSTOLIC BLOOD PRESSURE: 134 MMHG

## 2021-06-05 VITALS
DIASTOLIC BLOOD PRESSURE: 86 MMHG | HEIGHT: 63 IN | WEIGHT: 171.7 LBS | SYSTOLIC BLOOD PRESSURE: 152 MMHG | BODY MASS INDEX: 30.42 KG/M2 | OXYGEN SATURATION: 97 % | HEART RATE: 95 BPM | TEMPERATURE: 98.2 F

## 2021-06-07 NOTE — TELEPHONE ENCOUNTER
Left detailed message for Ekta with [facility], relaying message below from Dr. Fontaine regarding requested orders.  Asked that she call with any further questions.  Liz YANG CMA/BRITTANI....................11:37 AM

## 2021-06-07 NOTE — TELEPHONE ENCOUNTER
Spoke with Franklyn from [facility] and relayed messages below from Dr. Al and Dr. Fontaine.  He verbalized understanding and had no further questions at this time.  Liz YANG, ABBE/BRITTANI....................9:44 AM

## 2021-06-07 NOTE — TELEPHONE ENCOUNTER
Orders being requested:   Request PCP to follow homecare orders    Reason service is needed/diagnosis:   PT  OT  HHA    When are orders needed by:   Safety  Strengthening    Where to send Orders: Phone:  [phone]     Okay to leave detailed message?  Yes    Please call with verbal orders.

## 2021-06-07 NOTE — PROGRESS NOTES
"Lindsey Bermudez is a 88 y.o. female who is being evaluated via a billable telephone visit.      The patient has been notified of following:     \"This telephone visit will be conducted via a call between you and your physician/provider. We have found that certain health care needs can be provided without the need for a physical exam.  This service lets us provide the care you need with a short phone conversation.  If a prescription is necessary we can send it directly to your pharmacy.  If lab work is needed we can place an order for that and you can then stop by our lab to have the test done at a later time.    Telephone visits are billed at different rates depending on your insurance coverage. During this emergency period, for some insurers they may be billed the same as an in-person visit.  Please reach out to your insurance provider with any questions.    If during the course of the call the physician/provider feels a telephone visit is not appropriate, you will not be charged for this service.\"    Patient has given verbal consent to a Telephone visit? Yes    Patient would like to receive their AVS by AVS Preference: Tino.    Additional provider notes:  This is an 88 year old lady who calls in for a phone visit today to follow-up on a hospitalization and prolonged TCU stay.  The end of February she took a fall at home.  In reviewing the records she apparently had laid on the floor for about 24 hours before being found by neighbors.  She was hospitalized for several days.  The conclusion was that she was slightly dehydrated, had mild rhabdomyolysis, a possible urinary infection and probably a reaction to her Vesicare.  She was transferred upon discharge to a transitional care unit in [place] where her niece lives.  She had no other family in town at this time.  There for about 5 weeks and was released last Friday.  Her niece was with her through the weekend but is now returned home.  The patient reports that " she is actually feeling good.  She has a walker at home and feels steady on her feet.  She feels comfortable and safe at home.  She is sleeping reasonably well and her appetite is okay.  Some urinary frequency but no other urinary symptoms.  Breathing is good and she has no other complaints.    Assessment/Plan:    Hypertension.  Stable at discharge from TCU.  Continue current medication.    Fall.  In reviewing the records and talking with the patient and also with her niece seems most likely to be a combination of dehydration, urinary infection and medication reaction.  She has done well in TCU.    Chronic renal failure.  This was apparently also stable at discharge.    I would hopefully be able to see her in the office in June or early July.  I advised both the patient and her niece to call me should there be any problems or questions before then.    Phone call duration:  15 minutes    Kayleigh Tellez [facility]

## 2021-06-07 NOTE — TELEPHONE ENCOUNTER
Orders being requested:   PT 2 times a week for 3 weeks  OT evaluation and treat    Patient has declined a home health aide for bathing once a week.    Also reporting severe interaction alert with patient's Simvastatin and Amlodipine.  Any new orders for these medications, patient has been on both for years. Needed to report to PCP.  Reason service is needed/diagnosis: S/P hospitalization for fall, UTI  Gait training, strengthening and balance  When are orders needed by: ASAP  Where to send Orders: Phone:  [phone]  Okay to leave detailed message?  Yes

## 2021-06-07 NOTE — TELEPHONE ENCOUNTER
Upcoming Appointment Question  When is the appointment: Today  What is your appointment for?: post Rehab discharge  Who is your appointment scheduled with?: PCP only  What is your question/concern?: The cell phone number is needing to be changed to her home phone number as she is having issues with the cell.  For visit today please call [phone].   Okay to leave a detailed message?: Yes

## 2021-06-07 NOTE — TELEPHONE ENCOUNTER
Who is calling:  Patient flakita   Reason for Call:  Wants to be joined in on phone visit, there is no consent to communicate on file however niece will like to speak with the doctor afterwards if she can. She will have the patient to get verbal consent .  Date of last appointment with primary care:   Okay to leave a detailed message: Yes

## 2021-06-07 NOTE — TELEPHONE ENCOUNTER
Ok with PT/OT. Let Dr. Fontaine decide about the interaction between simvastatin and amlodipine. Personally and medically I am not concerned with this.

## 2021-06-08 NOTE — PROGRESS NOTES
[facility] Follow Up Note    Lindsey Bermudez   85 y.o. female    Date of Visit: 1/23/2017    Chief Complaint   Patient presents with     Fall     Fell last night and head hit hardwood floor, bump on right forehead, eye swollen shut, no headache or nausea     Subjective  This is an 85-year-old lady who comes in because of a fall she took last evening at home.  She reports that on Christmas Eve she slipped on some ice and fell striking the left side of her face.  Other than being bruised she did not think she had any significant injury and so she did not come to see me.  This finally started to heal when last evening after getting home from an outing she slipped on the floor going into her house.  This time she landed on the right side of her face and forehead developing a large hematoma on that portion of the forehead with a small superficial laceration as well as some swelling and bruising around the right orbit.  She has had no headache or dizziness and did not lose consciousness.  She iced the area down immediately and the swelling has come down considerably.  No visual disturbance.  She was able to sleep through the night.  She did not feel any worse this morning but thought this should probably be looked at.  A neighbor was able to drive her into the office today.  She has otherwise been in her usual state of health.    ROS A comprehensive review of systems was performed and was otherwise negative    Medications, allergies, and problem list were reviewed and updated    Exam  General Appearance:   On examination her blood pressure is 128/80.  Weight is 197 pounds and height is 63.5 inches.  BMI is 34.35.  Heart is in a sinus rhythm with a rate of 84 and no ectopy.    The patient is alert and oriented ×3.    There is some swelling over the right forehead.  There is a small superficial closed laceration.  The area is bruised and tender.  The tissue surrounding the orbit of that right eye  is also significantly ecchymotic and swollen.  It is also tender to the touch.  I am able to open the eye wide enough to see that the pupil looks normal.  There is some blood in the subconjunctival area of the right eye.    Neck is supple with good range of motion.      Assessment/Plan  1. Fall     2. Facial bruising       Bruising of the right forehead and periorbital area secondary to a fall last evening.  The fall was secondary to slipping and not some other type of incident.  She seems fairly stable this morning and so my recommendation was to continue to apply the cold packs for a couple of days.  Talked about symptoms to look for including headache or dizziness or visual changes.  She will call should there be any such changes and otherwise I warned her that this could take several weeks to clear up completely.  I will follow-up with her as needed.    Total time of this office visit was 25 minutes with greater than 50% of the time spent in care coordination and patient counseling.  Body Mass Index was not assessed due to The patient was in with an acute injury..    Kurtis Fontaine MD      Current Outpatient Prescriptions on File Prior to Visit   Medication Sig     acetaminophen (TYLENOL) 325 MG tablet Take 325-650 mg by mouth every 6 (six) hours as needed for pain.     calcium carbonate-vitamin D2 500 mg(1,250mg) -200 unit tablet Take 1 tablet by mouth 2 (two) times a day.      clobetasol 0.05 % shampoo Apply 1 application topically daily as needed. Scalp when washing hair     EMOLLIENT COMBINATION NO.68 (KERLINE ORIGINAL TOP) Apply 1 application topically as needed.     hydrocortisone 1 % ointment Apply 1 application topically 2 (two) times a day as needed. Apply and rub in a thin film to affected areas as needed, for rash/itching     levothyroxine (SYNTHROID, LEVOTHROID) 100 MCG tablet Take 100 mcg by mouth Daily at 6:00 am.     losartan (COZAAR) 25 MG tablet TAKE ONE TABLET BY MOUTH EVERY DAY     magnesium  oxide 250 mg Tab TK 1 T PO QD     oxybutynin (DITROPAN XL) 5 MG ER tablet TK 1 T PO QD     simvastatin (ZOCOR) 20 MG tablet Take 1 tablet (20 mg total) by mouth bedtime.     [DISCONTINUED] oxyCODONE (ROXICODONE) 5 MG immediate release tablet 0.5 - 1 tabs PO q4hr PRN pain. Do not drive. Do not mix wih alcohol.     [DISCONTINUED] losartan (COZAAR) 25 MG tablet Take 25 mg by mouth daily.     No current facility-administered medications on file prior to visit.      Allergies   Allergen Reactions     Adhesive Tape-Silicones      Blisters with some bandage like products     Social History   Substance Use Topics     Smoking status: Never Smoker     Smokeless tobacco: Never Used     Alcohol use Yes      Comment: rare

## 2021-06-09 NOTE — TELEPHONE ENCOUNTER
Refill Approved    Rx renewed per Medication Renewal Policy. Medication was last renewed on 5/1/2019.    Trena Diggs, [facility] Triage/Med Refill 7/14/2020     Requested Prescriptions   Pending Prescriptions Disp Refills     levothyroxine (SYNTHROID, LEVOTHROID) 100 MCG tablet [Pharmacy Med Name: LEVOTHYROXINE 0.100MG (100MCG) TAB] 90 tablet 3     Sig: TAKE 1 TABLET(100 MCG) BY MOUTH DAILY       Thyroid Hormones Protocol Passed - 7/14/2020  4:00 PM        Passed - Provider visit in past 12 months or next 3 months     Last office visit with prescriber/PCP: 7/1/2019 Kurtis Fontaine MD OR same dept: Visit date not found OR same specialty: 7/1/2019 Kurtis Fontaine MD  Last physical: Visit date not found Last MTM visit: Visit date not found   Next visit within 3 mo: Visit date not found  Next physical within 3 mo: Visit date not found  Prescriber OR PCP: Kurtis Fontaine MD  Last diagnosis associated with med order: 1. Hypothyroidism  - levothyroxine (SYNTHROID, LEVOTHROID) 100 MCG tablet [Pharmacy Med Name: LEVOTHYROXINE 0.100MG (100MCG) TAB]; TAKE 1 TABLET(100 MCG) BY MOUTH DAILY  Dispense: 90 tablet; Refill: 3    If protocol passes may refill for 12 months if within 3 months of last provider visit (or a total of 15 months).             Passed - TSH on file in past 12 months for patient age 12 & older     TSH   Date Value Ref Range Status   02/26/2020 1.79 0.30 - 5.00 uIU/mL Final

## 2021-06-10 NOTE — PROGRESS NOTES
Assessment/Plan:        See note    Hypertension.  Stable.  Continue current medication.    Chronic renal failure.  Continue follow-up with nephrology.    Hypothyroid.  Stable.  No change in medication.    Hyperlipidemia.  Also stable.  Continue current medication.    Osteoarthritis.  Slow deterioration but doing okay at this point in time.    She will set an appointment to establish care with 1 of my associates.    Subjective:    Patient ID: Lindsey Bermudez is a 88 y.o. female.    HPI  This is a very pleasant 88-year-old woman with multiple medical problems that include hypertension, hyperlipidemia, chronic renal failure, hypothyroidism and osteoarthritis.  She suffered a fall earlier this year and was hospitalized and subsequently in a transitional care unit for about 7 weeks.  She has been back home now for the past couple of months and is doing well.  She feels she is a little weaker than she had been but is fairly steady on her feet with a cane.  No headaches or dizziness no chest pain or shortness of breath.  Arthritic symptoms are slowly worsening but not to the point of affecting her ability to get around.  She does have a follow-up appointment later this month with her nephrologist regarding her kidney situation.  She offers no other complaints at this time.      Review of Systems  Negative in all other areas.        Objective:    Physical Exam      On examination her blood pressure is 124/70.  Weight is 171 pounds.  O2 saturation is 95%.    Lungs are clear.    Heart rhythm is stable with rate of 88 and no ectopy.    No peripheral edema.    Patient is alert and oriented x3.

## 2021-06-10 NOTE — TELEPHONE ENCOUNTER
Refill Approved    Rx renewed per Medication Renewal Policy. Medication was last renewed on 8/6/19.    Inessa [facility], [facility] Triage/Med Refill 8/18/2020     Requested Prescriptions   Pending Prescriptions Disp Refills     simvastatin (ZOCOR) 20 MG tablet [Pharmacy Med Name: SIMVASTATIN 20MG TABLETS] 90 tablet 3     Sig: TAKE 1 TABLET(20 MG) BY MOUTH AT BEDTIME       Statins Refill Protocol (Hmg CoA Reductase Inhibitors) Passed - 8/17/2020 12:49 PM        Passed - PCP or prescribing provider visit in past 12 months      Last office visit with prescriber/PCP: 8/10/2020 Kurtis Fontaine MD OR same dept: 8/10/2020 Kurtis Fontaine MD OR same specialty: 8/10/2020 Kurtis Fontaine MD  Last physical: Visit date not found Last MTM visit: Visit date not found   Next visit within 3 mo: Visit date not found  Next physical within 3 mo: Visit date not found  Prescriber OR PCP: Kurtis Fontaine MD  Last diagnosis associated with med order: 1. Hypercholesterolemia  - simvastatin (ZOCOR) 20 MG tablet [Pharmacy Med Name: SIMVASTATIN 20MG TABLETS]; TAKE 1 TABLET(20 MG) BY MOUTH AT BEDTIME  Dispense: 90 tablet; Refill: 3    2. Essential hypertension  - simvastatin (ZOCOR) 20 MG tablet [Pharmacy Med Name: SIMVASTATIN 20MG TABLETS]; TAKE 1 TABLET(20 MG) BY MOUTH AT BEDTIME  Dispense: 90 tablet; Refill: 3    3. CKD (chronic kidney disease) stage 4, GFR 15-29 ml/min (H)  - simvastatin (ZOCOR) 20 MG tablet [Pharmacy Med Name: SIMVASTATIN 20MG TABLETS]; TAKE 1 TABLET(20 MG) BY MOUTH AT BEDTIME  Dispense: 90 tablet; Refill: 3    4. Finger pain, left  - simvastatin (ZOCOR) 20 MG tablet [Pharmacy Med Name: SIMVASTATIN 20MG TABLETS]; TAKE 1 TABLET(20 MG) BY MOUTH AT BEDTIME  Dispense: 90 tablet; Refill: 3    If protocol passes may refill for 12 months if within 3 months of last provider visit (or a total of 15 months).

## 2021-06-12 NOTE — PROGRESS NOTES
[facility] Follow Up Note    Lindsey Bermudez   85 y.o. female    Date of Visit: 7/28/2017    Chief Complaint   Patient presents with     Follow-up     ER broken rib     Subjective  This is an 85-year-old lady who comes in today to follow-up on a visit made to the emergency room 3 days ago.  She had taken a fall.  She feels that she had somehow caught her cane while walking up the steps and came down on her right side.  She was seen in the emergency room and found to have a hematoma in the right posterior region of her scalp.  She also had a rib fracture.  She underwent a very extensive evaluation in the emergency room including multiple x-rays and CT scans of her head and chest as well as significant amount of blood work.  She was stable by the time of discharge from the ER and advised to follow-up with me at this time.  The scalp hematoma has continued to shrink and is almost gone at this point and causing minimal discomfort.  She is still having discomfort in the chest wall.  She was given some oxycodone and has taken it a couple of nights which has helped her sleep.  She is not short of breath and feels that the pain may be just a little better than it was a couple of days ago.  She is now walking with a walker to be safe.    ROS A comprehensive review of systems was performed and was otherwise negative    Medications, allergies, and problem list were reviewed and updated    Exam  General Appearance:   On examination her blood pressure was 122/60.  Weight is 202 pounds and height is 63 inches.  BMI is 35.78.    Heart is in a sinus rhythm with a rate of 78 and no ectopy.    The right lung is clear with good breath sounds.    There is some tenderness over the posterior chest wall in the right side.    The hematoma on the right posterior scalp is very small at this point with almost no tenderness.    The patient is alert and oriented ×3.      Assessment/Plan  1. Rib fracture     2. Hematoma        Rib fracture.  We discussed this at length and I think she will heal just fine but it may take a few weeks.    Scalp hematoma.  This is already clearing and should not give her any problems.    I discussed all of this with her at length and told her to call me should there be any questions or problems but I think she will be fine.  I am not concerned about the falls I believe it was mechanical in nature.    Total time of this office visit was 25 minutes with greater than 50% of the time spent in care coordination and patient counseling.  Body Mass Index was not assessed due to The patient was in with an acute medical issue..    Kurtis Fontaine MD      Current Outpatient Prescriptions on File Prior to Visit   Medication Sig     acetaminophen (TYLENOL) 325 MG tablet Take 325-650 mg by mouth every 6 (six) hours as needed for pain.     calcium citrate-vitamin D (CITRACAL+D) 315-200 mg-unit per tablet Take 1 tablet by mouth 2 (two) times a day.     hydrocortisone 1 % ointment Apply 1 application topically 2 (two) times a day as needed. Apply and rub in a thin film to affected areas as needed, for rash/itching     levothyroxine (SYNTHROID, LEVOTHROID) 100 MCG tablet Take 100 mcg by mouth Daily at 6:00 am.     losartan (COZAAR) 25 MG tablet Take 25 mg by mouth daily.     magnesium oxide 250 mg Tab Take 250 mg by mouth at bedtime.      oxyCODONE-acetaminophen (PERCOCET) 5-325 mg per tablet Take 1-2 tablets by mouth every 4 (four) hours as needed for pain.     simvastatin (ZOCOR) 20 MG tablet Take 1 tablet (20 mg total) by mouth bedtime.     solifenacin (VESICARE) 10 MG tablet Take 10 mg by mouth daily.     No current facility-administered medications on file prior to visit.      Allergies   Allergen Reactions     Adhesive Tape-Silicones      Blisters with some bandage like products     Social History   Substance Use Topics     Smoking status: Never Smoker     Smokeless tobacco: Never Used     Alcohol use Yes       Comment: rare

## 2021-06-12 NOTE — PROGRESS NOTES
Office Visit   Lindsey Bermudez   88 y.o. female    Date of Visit: 10/27/2020    Chief Complaint   Patient presents with     Follow-up     Fall        Assessment and Plan   1. CKD (chronic kidney disease) stage 4, GFR 15-29 ml/min (H)  She recently saw her kidney specialist.  Blood pressure is a little bit high today.  At that visit it was satisfactory.  I recommend that she monitor it and she is going to be establishing care at the [facility] in a month or 2 and can reassess at that time.  - HM2(CBC w/o Differential)  - Thyroid McCone  - Comprehensive Metabolic Panel  - Urinalysis-UC if Indicated    2. Hypercholesterolemia  - simvastatin (ZOCOR) 20 MG tablet; TAKE 1 TABLET(20 MG) BY MOUTH AT BEDTIME  Dispense: 90 tablet; Refill: 3    3. Essential hypertension  As noted above her blood pressure is elevated today.  She does have a cuff at home and is going to start monitoring.  - amLODIPine (NORVASC) 2.5 MG tablet; Take 1 tablet (2.5 mg total) by mouth daily.  Dispense: 90 tablet; Refill: 3    4. Hypothyroidism  She has not had a thyroid test done in a while.  We will do that today.  - levothyroxine (SYNTHROID, LEVOTHROID) 100 MCG tablet; TAKE 1 TABLET(100 MCG) BY MOUTH DAILY  Dispense: 90 tablet; Refill: 2  - Thyroid Cascade    5. Dizziness  She is having ongoing dizziness that is been going on for quite a long time.  It is difficult to know what is causing it.  It seems to be worse in the morning.  I will recheck some labs today including a thyroid test since she has not had a TSH in over a year.  Her blood pressure is a little high today and I have recommended that she monitor it.  She is going to be establishing care with a new doctor at the [facility] and we will have her set that up in about 6 weeks.  - HM2(CBC w/o Differential)  - Thyroid McCone  - Comprehensive Metabolic Panel  - Urinalysis-UC if Indicated         Return in about 6 weeks (around 12/8/2020) for Establish Care - [place] - 40  minute visit.     History of Present Illness   This 88 y.o. old female comes in to follow-up.  She has a history of hypertension, chronic kidney disease, hypothyroidism and hyperlipidemia.  She needs a refill on her medications.  Her only concern today is dizziness.  She states that when she gets up in the morning she feels dizzy and has to sit on the edge of the bed for a while.  Her symptoms unimproved.  She did have a significant fall last winter and ended up in the hospital and then a nursing home for several months.  She has not had any recent fevers or chills or other new symptoms.  She recently saw her kidney specialist and her evaluation was stable.  Her blood pressure is a little bit high today.  She has not been checking it recently.  Has not had any chest pain or palpitations or other new symptoms.    Review of Systems: As above, systems otherwise reviewed and negative.     Medications, Allergies and Problem List   Patient Active Problem List   Diagnosis     Hypercholesterolemia     Essential hypertension     Osteoarthritis     Primary Hypothyroidism     Diverticulitis Of Colon     CKD (chronic kidney disease) stage 4, GFR 15-29 ml/min (H)     Vitamin B12 deficiency (non anemic)     Anemia in chronic kidney disease     Current Outpatient Medications   Medication Sig Dispense Refill     acetaminophen (TYLENOL) 325 MG tablet Take 325-650 mg by mouth every 6 (six) hours as needed for pain.       calcium citrate-vitamin D (CITRACAL+D) 315-200 mg-unit per tablet Take 1 tablet by mouth 2 (two) times a day.       cyanocobalamin 1000 MCG tablet Take 1 tablet (1,000 mcg total) by mouth daily.  0     folic acid (FOLVITE) 1 MG tablet Take 1 tablet (1 mg total) by mouth daily. 30 tablet 0     levothyroxine (SYNTHROID, LEVOTHROID) 100 MCG tablet TAKE 1 TABLET(100 MCG) BY MOUTH DAILY 90 tablet 2     magnesium oxide 250 mg Tab Take 1 tablet (250 mg total) by mouth at bedtime. 90 tablet 3     simvastatin (ZOCOR) 20 MG  "tablet TAKE 1 TABLET(20 MG) BY MOUTH AT BEDTIME 90 tablet 3     amLODIPine (NORVASC) 2.5 MG tablet Take 1 tablet (2.5 mg total) by mouth daily. 90 tablet 3     hydrocortisone 1 % ointment Apply 1 application topically 2 (two) times a day as needed. Apply and rub in a thin film to affected areas as needed, for rash/itching       No current facility-administered medications for this visit.      Allergies   Allergen Reactions     Adhesive Tape-Silicones      Blisters with some bandage like products          Physical Exam     BP (!) 150/100   Pulse 94   Temp 97.2  F (36.2  C)   Ht 5' 3\" (1.6 m)   Wt 164 lb (74.4 kg)   SpO2 98%   BMI 29.05 kg/m      General: This is an alert, elderly female, in no apparent distress.  Walks with a cane and is a little bit unsteady.     Additional Information   Social History     Tobacco Use     Smoking status: Never Smoker     Smokeless tobacco: Never Used   Substance Use Topics     Alcohol use: Yes     Comment: rare     Drug use: No              Emelia Gomez MD    "

## 2021-06-12 NOTE — TELEPHONE ENCOUNTER
Lindsey Ling was wondering if you would be able to write a note stating that due to her age and fall risk she can get her mail delivered to her house.  She had the information but left it at home yesterday    Kayleigh Tellez CMA ([facility])

## 2021-06-13 NOTE — PROGRESS NOTES
[facility] Follow Up Note    Lindsey Bermudez   85 y.o. female    Date of Visit: 10/12/2017    Chief Complaint   Patient presents with     Follow-up     Flu Vaccine     Subjective  This is an 85-year-old lady with known hypertension, history of renal stones and chronic renal failure.  On her last visit she was in after a fall and I would refer to my note for the details of that incident.  She did finally heal up and was feeling better although she still has a lot of aches and pains relative to her degenerative arthritis.  Otherwise there has been little change.  Her urologist said that she is currently stone free and hopes to remain so.  She has a follow-up appointment with her kidney doctors next week for her renal failure.  She has not had any shortness of breath or chest pain.  Appetite is been stable.  She really offers no other new issues at this time.  Medications remain the same.    ROS A comprehensive review of systems was performed and was otherwise negative    Medications, allergies, and problem list were reviewed and updated    Exam  General Appearance:   On examination her blood pressure is 122/60.  Weight is 199 pounds and height is 63 inches.  BMI is 35.25.    Lungs are clear.    Heart is in a sinus rhythm with a rate of 80 and no ectopy.    No new peripheral edema.    The patient is alert and oriented ×3.      Assessment/Plan  1. Need for vaccination  Influenza High Dose, Seasonal 65+ yrs   2. Essential hypertension     3. Renal stones     4. CKD (chronic kidney disease) stage 4, GFR 15-29 ml/min  Renal Function Profile     Hypertension.  Well controlled.  Continue current medication.    Renal stones.  No recent recurrence.    Chronic renal failure.  Follow-up next week with nephrology.  We will check a renal profile today.    Chronic degenerative arthritis.  I did advise her that she could take an extra strength Tylenol 1 or 2 daily if needed if the regular strength is not  working.    Total time of this office visit was 25 minutes with greater than 50% of the time spent in care coordination and patient counseling.  The following high BMI interventions were performed this visit: weight monitoring    Kurtis Fontaine MD      Current Outpatient Prescriptions on File Prior to Visit   Medication Sig     acetaminophen (TYLENOL) 325 MG tablet Take 325-650 mg by mouth every 6 (six) hours as needed for pain.     calcium citrate-vitamin D (CITRACAL+D) 315-200 mg-unit per tablet Take 1 tablet by mouth 2 (two) times a day.     levothyroxine (SYNTHROID, LEVOTHROID) 100 MCG tablet Take 100 mcg by mouth Daily at 6:00 am.     losartan (COZAAR) 25 MG tablet Take 25 mg by mouth daily.     magnesium oxide 250 mg Tab Take 250 mg by mouth at bedtime.      simvastatin (ZOCOR) 20 MG tablet Take 1 tablet (20 mg total) by mouth bedtime.     solifenacin (VESICARE) 10 MG tablet Take 10 mg by mouth daily.     hydrocortisone 1 % ointment Apply 1 application topically 2 (two) times a day as needed. Apply and rub in a thin film to affected areas as needed, for rash/itching     oxyCODONE-acetaminophen (PERCOCET) 5-325 mg per tablet Take 1-2 tablets by mouth every 4 (four) hours as needed for pain.     No current facility-administered medications on file prior to visit.      Allergies   Allergen Reactions     Adhesive Tape-Silicones      Blisters with some bandage like products     Social History   Substance Use Topics     Smoking status: Never Smoker     Smokeless tobacco: Never Used     Alcohol use Yes      Comment: rare

## 2021-06-13 NOTE — PROGRESS NOTES
[facility] Note  Lindsey Bermudez   89 y.o. female    Date of Visit: 12/14/2020  Chief Complaint   Patient presents with     Establish Care     Assessment/Plan  1. CKD (chronic kidney disease) stage 4, GFR 15-29 ml/min (H)  2. Essential hypertension  Stable.  Followed nephrology back in September 2020.  BMP from 10/27/2020 stable and is asymptomatic today.  We will follow-up with nephrology in March 2021.  In the interim we will check her blood pressure and heart rate for the next 3 days and let us know via [employer].  Low threshold to consider increasing amlodipine from 2.5 to 5 mg, however I am cautious in order to avoid risk of falls in the setting of a history of falls.    3. Hypothyroidism, unspecified type  TSH 3.82 and stable.  Continue levothyroxine 100 mcg daily.     The ASCVD Risk score (Turrell DC Jr., et al., 2013) failed to calculate for the following reasons:    The 2013 ASCVD risk score is only valid for ages 40 to 79    Much or all of the text in this note was generated through the use of Dragon Dictate voice-to-text software. Errors in spelling or words which seem out of context are unintentional. Sound alike errors, in particular, may have escaped editing  Rio Ricardo MD    Return in 4 months (on 4/14/2021), or if symptoms worsen or fail to improve.    Subjective  This 89 y.o. old female. Patient of Dr. Fontaine.  History of CKD 4, hypercholesterolemia, hypothyroidism, hypertension.  Also follows a nephrologist Dr Yepez at [facility].  Last seen by Dr. Gomez on 10/27/2020, at which time she complained of dizziness and her blood pressure was elevated at 150/100.  February 2020 TTE with EF 74%. Does check her BP periodically at home, but not recently. She does have some urge incontinence and is making urine. She has regular BMs once daily. No dysuria, f/s/c. Endorses drinking lots of water due to her kidneys. Does not drink coffee/caffeine products. No gross  hematuria. She has been having these symptoms for > 1 year, with most recent UA from 10/27/20 that did grow E. coli on culture.    ROS A comprehensive review of systems was performed and was otherwise negative    Medications, allergies, and problem list were reviewed and updated    Exam  General appearance: Pleasant, nontoxic-appearing, no acute distress, alert and oriented x4  Vitals:    12/14/20 0923   BP: 152/86   Pulse: 95   Temp: 98.2  F (36.8  C)   SpO2: 97%   EYES: Eyelids, conjunctiva, and sclera were normal.   RESPIRATORY: Bilaterally with no crackles, wheezing or rhonchi  CARDIOVASCULAR: Regular S1 and S2.  Radial pulses intact. Trace pitting edema below the shins bilaterally  GASTROINTESTINAL: NABS, soft, NT, ND, no HSM  MUSCULOSKELETAL: Skeletal configuration was normal and muscle mass was normal for age.  SKIN/HAIR/NAILS: Skin color was normal.   NEUROLOGIC: Alert and oriented to person, place, time, and circumstance. Speech was normal.  Additional Information   Current Outpatient Medications   Medication Sig Dispense Refill     amLODIPine (NORVASC) 2.5 MG tablet Take 1 tablet (2.5 mg total) by mouth daily. 90 tablet 3     aspirin 81 MG EC tablet Take 81 mg by mouth daily.       calcium citrate-vitamin D (CITRACAL+D) 315-200 mg-unit per tablet Take 1 tablet by mouth 2 (two) times a day.       cyanocobalamin 1000 MCG tablet Take 1 tablet (1,000 mcg total) by mouth daily.  0     folic acid (FOLVITE) 1 MG tablet Take 1 tablet (1 mg total) by mouth daily. 30 tablet 0     hydrocortisone 1 % ointment Apply 1 application topically 2 (two) times a day as needed. Apply and rub in a thin film to affected areas as needed, for rash/itching       levothyroxine (SYNTHROID, LEVOTHROID) 100 MCG tablet TAKE 1 TABLET(100 MCG) BY MOUTH DAILY 90 tablet 2     magnesium oxide 250 mg Tab Take 1 tablet (250 mg total) by mouth at bedtime. 90 tablet 3     simvastatin (ZOCOR) 20 MG tablet TAKE 1 TABLET(20 MG) BY MOUTH AT BEDTIME  90 tablet 3     acetaminophen (TYLENOL) 325 MG tablet Take 325-650 mg by mouth every 6 (six) hours as needed for pain.       No current facility-administered medications for this visit.      Allergies   Allergen Reactions     Adhesive Tape-Silicones      Blisters with some bandage like products     Social History     Social History Narrative    Ambulates with cane at baseline. No family in town. Never . No kids. Former  for 42 years. Hobbies: active in [employer], reading and baking     Social History     Tobacco Use     Smoking status: Never Smoker     Smokeless tobacco: Never Used   Substance Use Topics     Alcohol use: Yes     Comment: rare     Drug use: No     Family History   Problem Relation Age of Onset     Stroke Mother      Heart disease Father      Dementia Sister      Stroke Sister      Cancer Sister      Past Surgical History:   Procedure Laterality Date     ANKLE SURGERY      plate     BACK SURGERY       BREAST SURGERY      biopsies     CATARACT EXTRACTION       CYSTOSCOPY W/ URETERAL STENT PLACEMENT N/A 11/9/2015    Procedure: CYSTOSCOPY, BLADDER BIOPSY AND FULGERATION.;  Surgeon: Linn Gilbert MD;  Location: [facility] OR;  Service:      NEPHROSTOMY W/ INTRODUCTION OF CATHETER Right    Time: total time spent with the patient was 25 minutes of which >50% was spent in counseling and coordination of care

## 2021-06-15 NOTE — PROGRESS NOTES
[facility] Follow Up Note    Lindsey Bermudez   86 y.o. female    Date of Visit: 12/27/2017    Chief Complaint   Patient presents with     Follow-up     fell about about 2 weeks ago     Subjective  This is an 86-year-old lady who comes in because of some bruising to the left side of her face suffered in a fall.  She is a little unsure of the day this happened but was about 2 weeks ago.  She had been to the store with a neighbor and when she got home because of the sidewalks were not completely cleared her cane was in snow.  When she got inside the door of her home the cane slipped on her hardwood floor and she fell striking the left side of her face primarily above her left eye.  She had some fairly immediate swelling but no headaches or dizziness and no visual disturbances.  She did apply ice packs per day or 2.  She comes in today, however, because she still has a fairly good sized lump over the left eye.  She still has no other symptoms and is feeling good but is concerned because the lump is not shrinking.    ROS A comprehensive review of systems was performed and was otherwise negative    Medications, allergies, and problem list were reviewed and updated    Exam  General Appearance:   On examination her blood pressure is 128/70.  Weight is 203 pounds and height is 63 inches.  BMI is 35.96.    Heart is in a sinus rhythm with a rate of 76 and no ectopy.    There is a fairly large 5 x 3 cm hematoma above the left eye.  There is some ecchymoses below the left eye and on the left side of the cheek into her neck.  There is tenderness over the hematoma.    Range of motion in the neck is good.    The patient is alert and oriented ×3.      Assessment/Plan  1. Hematoma       Fairly large hematoma above the left eye secondary to fall 2 weeks ago.  The ecchymoses is just likely blood being pulled downward by gravity.  I suspect this is going to take at least a couple of more weeks to heal up.  I  advised her to try some more cold packing if she could and to give it to 2 weeks.  If she is not making satisfactory progress at that time she will give me a call for follow-up.  Body Mass Index was not assessed due to The patient was in with an acute medical issue..    Kurtis Fontaine MD      Current Outpatient Prescriptions on File Prior to Visit   Medication Sig     acetaminophen (TYLENOL) 325 MG tablet Take 325-650 mg by mouth every 6 (six) hours as needed for pain.     calcium citrate-vitamin D (CITRACAL+D) 315-200 mg-unit per tablet Take 1 tablet by mouth 2 (two) times a day.     hydrocortisone 1 % ointment Apply 1 application topically 2 (two) times a day as needed. Apply and rub in a thin film to affected areas as needed, for rash/itching     levothyroxine (SYNTHROID, LEVOTHROID) 100 MCG tablet Take 100 mcg by mouth Daily at 6:00 am.     losartan (COZAAR) 25 MG tablet Take 25 mg by mouth daily.     magnesium oxide 250 mg Tab Take 250 mg by mouth at bedtime.      simvastatin (ZOCOR) 20 MG tablet TAKE 1 TABLET(20 MG) BY MOUTH AT BEDTIME     solifenacin (VESICARE) 10 MG tablet Take 10 mg by mouth daily.     oxyCODONE-acetaminophen (PERCOCET) 5-325 mg per tablet Take 1-2 tablets by mouth every 4 (four) hours as needed for pain.     No current facility-administered medications on file prior to visit.      Allergies   Allergen Reactions     Adhesive Tape-Silicones      Blisters with some bandage like products     Social History   Substance Use Topics     Smoking status: Never Smoker     Smokeless tobacco: Never Used     Alcohol use Yes      Comment: rare

## 2021-06-15 NOTE — PROGRESS NOTES
[facility] Follow Up Note    Lindsey Bermudez   86 y.o. female    Date of Visit: 1/18/2018    Chief Complaint   Patient presents with     Follow-up     fall     Cough     Subjective  This is an 86-year-old lady that I saw about 3 weeks ago for bruising to her face after a fall.  This is continued to improve.  The hematoma has gradually been reabsorbed and now there is minimal ecchymoses around the left eye and cheek.  The face is much less tender and she is progressing as expected.  Her primary reason for coming in today is a persistent respiratory infection.  She tells me the symptoms began just a few days after her last visit with us.  She has had a cough that is been productive and increasing lack of energy.  She denies any fever or chills.  She has been using over-the-counter cough medication with some relief but the symptoms are not going away.  As a result her energy levels are getting gradually worse.  No other new symptoms.    ROS A comprehensive review of systems was performed and was otherwise negative    Medications, allergies, and problem list were reviewed and updated    Exam  General Appearance:   On examination today her blood pressure is 122/60.  Weight is 201 pounds and height is 63 inches.  BMI is 35.61.    Heart is in sinus rhythm with a rate of 80 and no ectopy.    No enlarged cervical lymph nodes.    Lungs are clear.    Minimal ecchymoses of the left cheek and around the left eye.    The patient is alert and oriented ×3.      Assessment/Plan  1. URI (upper respiratory infection)       Persistent upper respiratory infection.  Given her age and the length of time this is been going on I think an antibiotic would be in order and so we will start her on a Z-Mathew.  She will continue her over-the-counter cough preparation.  She will call me next week if she is not making significant improvement.    Bruising to the face secondary to a fall.  This is making acceptable progress  and I do not anticipate any further problems.  Body Mass Index was not assessed due to The patient was in with an acute medical issue..    Kurtis Fontaine MD      Current Outpatient Prescriptions on File Prior to Visit   Medication Sig     acetaminophen (TYLENOL) 325 MG tablet Take 325-650 mg by mouth every 6 (six) hours as needed for pain.     calcium citrate-vitamin D (CITRACAL+D) 315-200 mg-unit per tablet Take 1 tablet by mouth 2 (two) times a day.     hydrocortisone 1 % ointment Apply 1 application topically 2 (two) times a day as needed. Apply and rub in a thin film to affected areas as needed, for rash/itching     levothyroxine (SYNTHROID, LEVOTHROID) 100 MCG tablet Take 100 mcg by mouth Daily at 6:00 am.     losartan (COZAAR) 25 MG tablet Take 25 mg by mouth daily.     magnesium oxide 250 mg Tab Take 250 mg by mouth at bedtime.      oxyCODONE-acetaminophen (PERCOCET) 5-325 mg per tablet Take 1-2 tablets by mouth every 4 (four) hours as needed for pain.     simvastatin (ZOCOR) 20 MG tablet TAKE 1 TABLET(20 MG) BY MOUTH AT BEDTIME     solifenacin (VESICARE) 10 MG tablet Take 10 mg by mouth daily.     No current facility-administered medications on file prior to visit.      Allergies   Allergen Reactions     Adhesive Tape-Silicones      Blisters with some bandage like products     Social History   Substance Use Topics     Smoking status: Never Smoker     Smokeless tobacco: Never Used     Alcohol use Yes      Comment: rare

## 2021-06-16 PROBLEM — E53.8 VITAMIN B12 DEFICIENCY (NON ANEMIC): Status: ACTIVE | Noted: 2020-02-27

## 2021-06-16 PROBLEM — N18.9 ANEMIA IN CHRONIC KIDNEY DISEASE: Status: ACTIVE | Noted: 2019-10-31

## 2021-06-16 PROBLEM — D63.1 ANEMIA IN CHRONIC KIDNEY DISEASE: Status: ACTIVE | Noted: 2019-10-31

## 2021-06-17 NOTE — PROGRESS NOTES
[facility] Follow Up Note    Lindsey Bermudez   86 y.o. female    Date of Visit: 3/29/2018    Chief Complaint   Patient presents with     Hand Pain     left     Labs Only     going to Kidney dr next week     Subjective  This is an 86-year-old lady with known hypertension and chronic renal failure.  She is due for blood work prior to her next appointment with the kidney doctors which is in just under 2 weeks.  For the most part she has been feeling okay.  In addition to the blood work she comes in today because she has been having some pain into the third finger of her left hand.  This started about 2-3 weeks ago.  There was no precipitating cause and no injury to the finger.  When she had a fall in December she did have a plastic bag wrapped around that finger but it is not giving her any trouble until now.  No significant swelling and she has noticed no discoloration in the finger.  She is having trouble squeezing the finger and gripping any objects.  Overall she seems to feel that her degenerative arthritis is slowly worsening.  Otherwise she offers no specific new complaints today.    ROS A comprehensive review of systems was performed and was otherwise negative    Medications, allergies, and problem list were reviewed and updated    Exam  General Appearance:   On examination her blood pressure is up today at 158/92.  Weight is 209 pounds and height is 63 inches.  BMI is 37.02.    Heart is in a sinus rhythm with a rate of 86 and no ectopy.    No new edema.    Examination of the left hand shows no swelling.  Particular attention to the third finger shows no swelling of the finger.  Skin color and temperature are normal.  Range of motion is okay but grasp is weak.  There is some tenderness over that third finger.  The remainder of the hand appears to be okay.    The patient is alert and oriented ×3.      Assessment/Plan  1. Finger pain, left     2. Hypercholesterolemia  simvastatin (ZOCOR)  20 MG tablet   3. Essential hypertension     4. CKD (chronic kidney disease) stage 4, GFR 15-29 ml/min       Pain in the third finger of the left hand.  This may be just an exacerbation of her arthritis.  I suggested she try some heat and/or cold on it to see if that would help.  If it is not helping or if it is getting worse she will let me know.    Chronic renal failure.  We will do the blood work requested by nephrology and forwarded to them prior to the patient's appointment there in 2 weeks.    Hypertension.  Blood pressure is high today although she feels she is just a little bit upset.  I suggested that we not change medication but see what her blood pressure is at the nephrologist in 2 weeks and then decide if any adjustments need to be made.    Total time of this office visit was 25 minutes with greater than 50% of the time spent in care coordination and patient counseling.  Body Mass Index was not assessed due to The patient was in with an acute medical issue..    Kurtis Fontaine MD      Current Outpatient Prescriptions on File Prior to Visit   Medication Sig     acetaminophen (TYLENOL) 325 MG tablet Take 325-650 mg by mouth every 6 (six) hours as needed for pain.     calcium citrate-vitamin D (CITRACAL+D) 315-200 mg-unit per tablet Take 1 tablet by mouth 2 (two) times a day.     hydrocortisone 1 % ointment Apply 1 application topically 2 (two) times a day as needed. Apply and rub in a thin film to affected areas as needed, for rash/itching     levothyroxine (SYNTHROID, LEVOTHROID) 100 MCG tablet Take 100 mcg by mouth Daily at 6:00 am.     losartan (COZAAR) 25 MG tablet TAKE ONE TABLET BY MOUTH EVERY DAY     [DISCONTINUED] magnesium oxide 250 mg Tab Take 250 mg by mouth at bedtime.      [DISCONTINUED] simvastatin (ZOCOR) 20 MG tablet TAKE 1 TABLET(20 MG) BY MOUTH AT BEDTIME     oxyCODONE-acetaminophen (PERCOCET) 5-325 mg per tablet Take 1-2 tablets by mouth every 4 (four) hours as needed for pain.      solifenacin (VESICARE) 10 MG tablet Take 10 mg by mouth daily.     [DISCONTINUED] losartan (COZAAR) 25 MG tablet Take 25 mg by mouth daily.     No current facility-administered medications on file prior to visit.      Allergies   Allergen Reactions     Adhesive Tape-Silicones      Blisters with some bandage like products     Social History   Substance Use Topics     Smoking status: Never Smoker     Smokeless tobacco: Never Used     Alcohol use Yes      Comment: rare

## 2021-06-18 NOTE — PATIENT INSTRUCTIONS - HE
Patient Instructions by Rio Ricardo MD at 12/14/2020  9:20 AM     Author: Rio Ricardo MD Service: -- Author Type: Physician    Filed: 12/14/2020 10:10 AM Encounter Date: 12/14/2020 Status: Addendum    : Rio Ricardo MD (Physician)    Related Notes: Original Note by Rio Ricardo MD (Physician) filed at 12/14/2020 10:10 AM         For your blood pressure, check your blood pressure once daily in the afternoon for the next 3 days. Record the heart rate and blood pressure and call us to let us know what these numbers. This will he[p us decide whether to make small changes to your blood pressure medications     Dr Yepez's clinic would like to see you in 6 months from the September visit (I.e. March 2021). You can set up that appointment before you see us. I do not think we need to check your kidney function/labs today.     Patient Education   Overactive Bladder Syndrome (OAB)     Normally, urine stays in the bladder until a person decides to release it. With OAB, the bladder muscles contract involuntarily, causing a sudden urge to urinate and even urine leakage.   When the bladder muscles squeeze (contract) involuntarily, it is called overactive bladder syndrome (OAB). This causes an intense urge to urinate, called urgency. Urgency can occur many times during the day and night. If urine leaks with the urgency, it is called urge incontinence.  A disease that affects the bladder nerves, such as multiple sclerosis, can cause overactive bladder syndrome. Other conditions can also lead to OAB. These include urinary tract infection (UTI) or prostate problems in men. But the exact cause is often not known.  How is overactive bladder syndrome diagnosed?  Your healthcare provider will examine you and ask about your symptoms and health history. You may also have 1 or more of these tests:    Urine test. Urine samples are taken and checked for problems.    Urinary  diary. You record how much fluid you take in and urinate out for 3 days.    Bladder ultrasound. This studies the bladder as it empties. Ultrasound uses sound waves to make detailed images of the inside of the body.    Cystoscopy. This test lets the provider look for problems in the urinary tract. The test uses a thin, flexible scope (cystoscope) with a light and camera on the end. The scope is put into the tube that takes urine out of the body (urethra).    Urodynamic studies. This is a group of tests. They measure and record many aspects of urinary bladder function. This includes pressures, volume, and urine flow.  How is overactive bladder syndrome treated?  Treatment depends on the cause and severity of your OAB. Treatments may include:    Changing your urination habits. Your healthcare provider may advise you to urinate as soon as you feel the urge. You may also need to limit how much fluid you have during the day.    Exercising your pelvic muscles. This can help strengthen muscles used during urination. These exercises are called Kegels. They include carole as if you were stopping your urine stream. And tightening your rectum as if trying not to pass gas. Your provider can help you learn how to do Kegels.    Biofeedback. This can help you learn to control the movement of your bladder muscles. Sensors are placed on your belly. They turn signals given off by your muscles into lines on a computer screen.    Medicine. This may be given to relax the bladder muscle. Medicine can also help ease bladder contractions. This reduces the urge to urinate.    Neuromodulation. This may be done if medicine and behavioral changes dont work. Electrical pulses are sent to the nerves that affect the pelvic area (sacral nerves). These pulses help relieve OAB and urge incontinence.    Botulinum toxin shots. These can be injected into the muscle of the bladder to relax the muscles.    Surgery. When less invasive treatments don't  work, surgery to make the bladder larger may be done in severe cases.  With treatment, OAB can be managed. A condition, such as UTI, that has led to your OAB will be treated. Treatment may include taking medicine for months or years. You may also need to make changes in your daily routine. This may include going to the bathroom more often than you think you need to. Or you may need to limit caffeine and alcohol because these can make symptoms worse. Your healthcare provider can tell you more.  When to call your healthcare provider  Call the healthcare provider right away if you have any of these:    Fever of 100.4 F (38.0 C) or higher, or as directed by your provider    Symptoms don't get better, or get worse with treatment    Trouble urinating because of pain    Back or belly pain

## 2021-06-21 NOTE — LETTER
Letter by Kurtis Fontaien MD at      Author: Kurtis Fontaine MD Service: -- Author Type: --    Filed:  Encounter Date: 10/28/2020 Status: (Other)       To whom it may concern,    I am writing this letter in regard to Lindsey Bermudez.  She has a history of falling and balance issues and is high risk for falling if she has to walk to her mail box.  She therefore needs to have her mail delivered to her house.      I appreciate your consideration in this matter.    Sincerely,        Emelia Gomez MD

## 2021-06-21 NOTE — LETTER
Letter by Emelia Gomez MD at      Author: Emelia Gomez MD Service: -- Author Type: --    Filed:  Encounter Date: 10/29/2020 Status: (Other)         Lindsey Bermudez  [address]  [facility] [address]             October 29, 2020        To Whom it May Concern:     I am writing this letter in regard to Lindsey Bermudez.  She has a history of falling and balance issues and  is high risk for falling if she has to walk to her mail box.  She therefore needs to have her mail delivered  to her house. I appreciate your consideration in this matter.    Please call with questions or contact us using [employer].    Sincerely,              Electronically signed by Emelia Gomez MD

## 2021-06-21 NOTE — PROGRESS NOTES
[facility] Follow Up Note    Lindsey Bermudez   87 y.o. female    Date of Visit: 11/26/2018    Chief Complaint   Patient presents with     Fall     follow up - right leg swelling and pain     Subjective  This is an 87-year-old lady who was seen 2 weeks ago for injury suffered in a fall.  Her primary concern at that time was her right shoulder.  Since then she has had a little better range of motion in the shoulder.  The pain is diminished but it is not returned to baseline.  Her bigger concern today is her right lower leg which is been swollen from the knee down and getting worse over the past week.  She has not noticed that the leg is been hot or cold.  She has noticed no discoloration.  Walking is somewhat difficult.  No issues with the left leg.  She has not been keeping the leg elevated regularly.  No other new complaints.    ROS A comprehensive review of systems was performed and was otherwise negative    Medications, allergies, and problem list were reviewed and updated    Exam  General Appearance:   On examination today her blood pressure is 132/74.  Weight is 196 pounds and height is 63 inches.  BMI is 34.72.    Heart is in a sinus rhythm with a rate of 88 and no ectopy.    Right shoulder: There is no tenderness.  She still has limited abduction of that shoulder but it has improved from 2 weeks ago.    The right lower leg is swollen from the knee to the ankle.  Skin color and temperature are normal.  There is a little posterior tenderness at the knee level.    She is walking with a walker.    The patient is alert and oriented x3.      Assessment/Plan  1. Leg edema, right  US Venous Leg Right   2. Need for immunization against influenza  Influenza High Dose, Seasonal 65+ yrs   3. Right shoulder pain, unspecified chronicity       Edema of the right lower extremity.  I would like to do a venous ultrasound of this today to make sure that she does not have some type of clot.  If it is  negative she was advised that she will go home and apply a little heat and stay off the leg as much as possible over the next week.    Right shoulder pain.  This is slowly improving.  Again I reassured her that this can take a long time so we will give it another couple of weeks.    I will follow-up on both of these issues.  Total time of this office visit was 25 minutes with greater than 50% of the time spent in care coordination and patient counseling.  Body Mass Index was not assessed due to Patient was in with an acute medical issue..    Kurtis Fontaine MD      Current Outpatient Medications on File Prior to Visit   Medication Sig     calcium citrate-vitamin D (CITRACAL+D) 315-200 mg-unit per tablet Take 1 tablet by mouth 2 (two) times a day.     hydrocortisone 1 % ointment Apply 1 application topically 2 (two) times a day as needed. Apply and rub in a thin film to affected areas as needed, for rash/itching     levothyroxine (SYNTHROID, LEVOTHROID) 100 MCG tablet TAKE 1 TABLET(100 MCG) BY MOUTH DAILY     losartan (COZAAR) 25 MG tablet Take 1 tablet (25 mg total) by mouth daily.     magnesium oxide 250 mg Tab Take 1 tablet (250 mg total) by mouth at bedtime.     simvastatin (ZOCOR) 20 MG tablet Take 1 tablet (20 mg total) by mouth at bedtime.     solifenacin (VESICARE) 10 MG tablet Take 10 mg by mouth daily.     acetaminophen (TYLENOL) 325 MG tablet Take 325-650 mg by mouth every 6 (six) hours as needed for pain.     tolterodine (DETROL LA) 4 MG ER capsule Take 4 mg by mouth daily.     [DISCONTINUED] levothyroxine (SYNTHROID, LEVOTHROID) 100 MCG tablet Take 100 mcg by mouth Daily at 6:00 am.     [DISCONTINUED] oxyCODONE-acetaminophen (PERCOCET) 5-325 mg per tablet Take 1-2 tablets by mouth every 4 (four) hours as needed for pain.     No current facility-administered medications on file prior to visit.      Allergies   Allergen Reactions     Adhesive Tape-Silicones      Blisters with some bandage like products      Social History     Tobacco Use     Smoking status: Never Smoker     Smokeless tobacco: Never Used   Substance Use Topics     Alcohol use: Yes     Comment: rare     Drug use: No

## 2021-06-21 NOTE — PROGRESS NOTES
[facility]    Lindsey Bermudez   87 y.o. female    Date of Visit: 11/12/2018    Chief Complaint   Patient presents with     Pain     right side, had a fall     Subjective  This is an 87-year-old lady who comes in because of injury suffered in a fall 1 week ago today.  She slipped going up a curb on the snow and landed on her right side.  She injured her right shoulder, right elbow, right hip and right knee.  At this point the hip was giving her very little discomfort.  She still has some minor pains in the right knee although range of motion is been good and she has been able to walk with her walker.  She thinks this is improving.  The right elbow she describes as being more of a scraping injury than below.  Her biggest issue is her right shoulder which continues to cause pain and is giving her significant limitation in range of motion.  There is been very little improvement in 1 week.  She has otherwise been in her usual state of health.    ROS A comprehensive review of systems was performed and was otherwise negative    Medications, allergies, and problem list were reviewed and updated    Exam  General Appearance:   On examination today her blood pressure is 118/64.  Weight is 196 pounds and height is 63 inches.  BMI is 34.72.    Heart is in a sinus rhythm with a rate of 90 and no ectopy.    Right knee shows no swelling and no tenderness.    Right elbow is not swollen.  Range of motion and strength at the elbow are good.    Right shoulder shows some tenderness over the lateral aspect of the shoulder.  She has limited range of motion and strength with abduction of that shoulder.    The patient is alert and oriented x3.      Assessment/Plan  1. Acute pain of right shoulder       Right shoulder pain.  It is possible she has a strain or small tear in the joint.  We discussed this at great length and I suggested she try alternating some heat and cold to the shoulder for the next  week.  I explained that an x-ray would not likely show anything.  I also explained that it was probably too early to proceed with an MRI scan.  If she is not showing some signs of improvement in a week she will call me and we will consider other options such as orthopedic consultation.    Right elbow, hip and knee discomfort secondary to fall.  These appear to be improving and I see no evidence of any significant issue.  She will continue to follow these and call me if there is any change.    I will see her back as needed.  Total time of this office visit was 25 minutes with greater than 50% of the time spent in care coordination of patient counseling.  Body Mass Index was not assessed due to Patient was in with an acute medical issue..    Kurtis Fontaine MD      Current Outpatient Medications on File Prior to Visit   Medication Sig     acetaminophen (TYLENOL) 325 MG tablet Take 325-650 mg by mouth every 6 (six) hours as needed for pain.     calcium citrate-vitamin D (CITRACAL+D) 315-200 mg-unit per tablet Take 1 tablet by mouth 2 (two) times a day.     hydrocortisone 1 % ointment Apply 1 application topically 2 (two) times a day as needed. Apply and rub in a thin film to affected areas as needed, for rash/itching     levothyroxine (SYNTHROID, LEVOTHROID) 100 MCG tablet Take 100 mcg by mouth Daily at 6:00 am.     levothyroxine (SYNTHROID, LEVOTHROID) 100 MCG tablet TAKE 1 TABLET(100 MCG) BY MOUTH DAILY     losartan (COZAAR) 25 MG tablet Take 1 tablet (25 mg total) by mouth daily.     magnesium oxide 250 mg Tab Take 1 tablet (250 mg total) by mouth at bedtime.     oxyCODONE-acetaminophen (PERCOCET) 5-325 mg per tablet Take 1-2 tablets by mouth every 4 (four) hours as needed for pain.     simvastatin (ZOCOR) 20 MG tablet Take 1 tablet (20 mg total) by mouth at bedtime.     solifenacin (VESICARE) 10 MG tablet Take 10 mg by mouth daily.     tolterodine (DETROL LA) 4 MG ER capsule Take 4 mg by mouth daily.     No  current facility-administered medications on file prior to visit.      Allergies   Allergen Reactions     Adhesive Tape-Silicones      Blisters with some bandage like products     Social History     Tobacco Use     Smoking status: Never Smoker     Smokeless tobacco: Never Used   Substance Use Topics     Alcohol use: Yes     Comment: rare     Drug use: No            No

## 2021-06-23 NOTE — TELEPHONE ENCOUNTER
RN cannot approve Refill Request    RN can NOT refill this medication PCP messaged that patient is overdue for TSH. and Protocol failed and RN gave three month refill.  Last OV 11/26/2018.  Last parathyroid hormone test 10/1/2018.  Last office visit: 11/26/2018 Kurtis Fontaine MD Last Physical: 2/24/2016 Last MTM visit: Visit date not found Last visit same specialty: 11/26/2018 Kurtis Fontaine MD.  Next visit within 3 mo: Visit date not found  Next physical within 3 mo: Visit date not found      Miriam SERRANO [facility], [facility] Triage/Med Refill 1/16/2019    Requested Prescriptions   Pending Prescriptions Disp Refills     levothyroxine (SYNTHROID, LEVOTHROID) 100 MCG tablet [Pharmacy Med Name: LEVOTHYROXINE 0.100MG (100MCG) TAB] 90 tablet 0     Sig: TAKE 1 TABLET(100 MCG) BY MOUTH DAILY    Thyroid Hormones Protocol Failed - 1/16/2019 10:09 AM       Failed - TSH on file in past 12 months for patient age 12 & older    TSH   Date Value Ref Range Status   12/01/2014 1.62 0.30 - 5.05 uIU/mL Final                  Passed - Provider visit in past 12 months or next 3 months    Last office visit with prescriber/PCP: 11/26/2018 Kurtis Fontaine MD OR same dept: 11/26/2018 Kurtis Fontaine MD OR same specialty: 11/26/2018 Kurtis Fontaine MD  Last physical: 2/24/2016 Last MTM visit: Visit date not found   Next visit within 3 mo: Visit date not found  Next physical within 3 mo: Visit date not found  Prescriber OR PCP: Kurtis Fontaine MD  Last diagnosis associated with med order: 1. Hypothyroidism  - levothyroxine (SYNTHROID, LEVOTHROID) 100 MCG tablet [Pharmacy Med Name: LEVOTHYROXINE 0.100MG (100MCG) TAB]; TAKE 1 TABLET(100 MCG) BY MOUTH DAILY  Dispense: 90 tablet; Refill: 0    If protocol passes may refill for 12 months if within 3 months of last provider visit (or a total of 15 months).

## 2021-06-25 NOTE — TELEPHONE ENCOUNTER
Refill Approved    Rx renewed per Medication Renewal Policy. Medication was last renewed on 6/3/18.    Peggy Shin, [facility] Triage/Med Refill 3/17/2019     Requested Prescriptions   Pending Prescriptions Disp Refills     losartan (COZAAR) 25 MG tablet [Pharmacy Med Name: LOSARTAN 25MG TABLETS] 90 tablet 0     Sig: TAKE 1 TABLET(25 MG) BY MOUTH DAILY    Angiotensin Receptor Blocker Protocol Passed - 3/14/2019  2:21 PM       Passed - PCP or prescribing provider visit in past 12 months      Last office visit with prescriber/PCP: 11/26/2018 Kurtis Fontaine MD OR same dept: 11/26/2018 Kurtis Fontaine MD OR same specialty: 11/26/2018 Kurtis Fontaine MD  Last physical: 2/24/2016 Last MTM visit: Visit date not found   Next visit within 3 mo: Visit date not found  Next physical within 3 mo: Visit date not found  Prescriber OR PCP: Kurtis Fontaine MD  Last diagnosis associated with med order: 1. Essential hypertension  - losartan (COZAAR) 25 MG tablet [Pharmacy Med Name: LOSARTAN 25MG TABLETS]; TAKE 1 TABLET(25 MG) BY MOUTH DAILY  Dispense: 90 tablet; Refill: 0    If protocol passes may refill for 12 months if within 3 months of last provider visit (or a total of 15 months).            Passed - Serum potassium within the past 12 months    Lab Results   Component Value Date    Potassium 4.7 10/01/2018            Passed - Blood pressure filed in past 12 months    BP Readings from Last 1 Encounters:   11/26/18 132/74            Passed - Serum creatinine within the past 12 months    Creatinine   Date Value Ref Range Status   10/01/2018 2.16 (H) 0.60 - 1.10 mg/dL Final

## 2021-06-29 NOTE — PROGRESS NOTES
Progress Notes by Maddi Jacobs LPN at 9/15/2020 10:00 AM     Author: Maddi Jacobs LPN Service: -- Author Type: Licensed Nurse    Filed: 9/15/2020  9:56 AM Encounter Date: 9/15/2020 Status: Attested    : Maddi Jacobs LPN (Licensed Nurse) Cosigner: Kurtis Fontaine MD at 9/16/2020  8:43 AM    Attestation signed by Kurtis Fontaine MD at 9/16/2020  8:43 AM    Thank you                I met with Lindsey Bermudez at the request of [facility] to recheck her blood pressure.  Blood pressure medications on the MAR were reviewed with patient.    Patient has taken all medications as per usual regimen: Yes  Patient reports tolerating them without any issues or concerns: Yes    Vitals:    09/15/20 0955   BP: 134/78   Patient Site: Left Arm   Patient Position: Sitting   Cuff Size: Adult Regular   Pulse: 90   Weight: 165 lb 4 oz (75 kg)       Blood pressure was taken, previous encounter was reviewed, recorded blood pressure below 140/90.  Patient was discharged and the note will be sent to the provider for final review.

## 2021-07-03 NOTE — ADDENDUM NOTE
Addendum Note by Mitch Ruiz MLT at 11/6/2017  9:17 AM     Author: Mithc Ruiz MLT Service: -- Author Type:     Filed: 11/6/2017  9:17 AM Encounter Date: 10/13/2017 Status: Signed    : Mitch Ruiz MLT ()    Addended by: MITCH RUIZ on: 11/6/2017 09:17 AM        Modules accepted: Orders

## 2021-07-03 NOTE — ADDENDUM NOTE
Addendum Note by Ashley Gomez MD at 10/27/2020 11:40 AM     Author: Ashley Gomez MD Service: -- Author Type: Physician    Filed: 10/27/2020  4:31 PM Encounter Date: 10/27/2020 Status: Signed    : Ashley Gomez MD (Physician)    Addended by: ASHLEY GOMEZ on: 10/27/2020 04:31 PM        Modules accepted: Orders

## 2021-07-14 PROBLEM — T79.6XXA TRAUMATIC RHABDOMYOLYSIS (H): Status: RESOLVED | Noted: 2020-02-25 | Resolved: 2020-10-27

## 2021-09-20 NOTE — PROGRESS NOTES
"    Assessment & Plan     She is here to establish care:    Primary hypothyroidism  She is on Synthroid.  We will check her thyroid.  - TSH with free T4 reflex; Future  - TSH with free T4 reflex    Polyarthralgia  We will check inflammatory markers to rule out PMR.  - ESR: Erythrocyte sedimentation rate; Future  - CRP, inflammation; Future  - ESR: Erythrocyte sedimentation rate  - CRP, inflammation    Vitamin B12 deficiency (non anemic)  She is on a vitamin B12 supplement.  - Vitamin B12; Future  - Vitamin B12    Urinary incontinence, unspecified type  Follows with urology at [facility].  DANNY signed    Renal stone    CKD (chronic kidney disease) stage 4, GFR 15-29 ml/min (H)  Anemia in chronic kidney disease, unspecified CKD stage  Secondary hyperparathyroidism of renal origin (H)  She follows with nephrology.  She is still making urine.  She gets labs done, available care everywhere.    Hypercholesterolemia  She is on a statin.    Essential hypertension  Well-controlled on amlodipine               BMI:   Estimated body mass index is 28.73 kg/m  as calculated from the following:    Height as of this encounter: 1.6 m (5' 3\").    Weight as of this encounter: 73.6 kg (162 lb 3 oz).           Return in about 6 months (around 3/21/2022) for Follow up, with me.    Kari Oliveira MD  [facility]    Ayla Portillo is a 89 year old who presents for the following health issues     HPI     Here to establish care. Was previously with  \"[facility]\" for a long time.     Lives alone, in a side by side home as part of a larger community of similar homes. Has a sister,  from cancer. Had an older sister who  of old age. 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 Was previously active in a [employer] that her grandpa helped build, but then that Jewish closed several years ago. Now attends another Jewish. Has a cabin in [place] that she goes to in the summer. She has a cleaning " "person come once a month. In the past she had a bad fall and was in a care center for several weeks; since then has had monthly help with cleaning. She has a walker that she uses when she goes for appointments or long walks.     Shoulders, hips, and ankles hurt a lot. More recent years. Hurts when reaching up.  She is thinking it is due to old age.    She has CKD and has a nephrologist. She still urinates.no swelling or SOB.     Hx o kidney stones. Saw Urology recently virtually. In 6 months follow up. Has urgency.  She sees somebody at [facility].    She has a history of skin cancer.  She no longer sees dermatology.      She takes vitamin B12.    Had skin cancer on skin. No longer seem sderm.    She had back surgery in lumbar spine. Back pain is better now, not entirely gone.     Review of Systems   See above      Objective    /80 (BP Location: Right arm, Patient Position: Sitting, Cuff Size: Adult Small)   Pulse 78   Ht 5' 3\" (1.6 m)   Wt 162 lb 3 oz (73.6 kg)   SpO2 95%   BMI 28.73 kg/m    Body mass index is 28.73 kg/m .  Physical Exam   GENERAL: healthy, alert and no distress  RESP: lungs clear to auscultation - no rales, rhonchi or wheezes  CV: regular rate and rhythm, normal S1 S2, no S3 or S4, no murmur, click or rub, no peripheral edema and peripheral pulses strong  ABDOMEN: soft, nontender, no hepatosplenomegaly, no masses and bowel sounds normal  MS: no gross musculoskeletal defects noted, no edema                "

## 2021-09-21 ENCOUNTER — OFFICE VISIT (OUTPATIENT)
Dept: PEDIATRICS | Facility: CLINIC | Age: 86
End: 2021-09-21
Payer: MEDICARE

## 2021-09-21 VITALS
WEIGHT: 162.19 LBS | DIASTOLIC BLOOD PRESSURE: 80 MMHG | OXYGEN SATURATION: 95 % | HEIGHT: 63 IN | HEART RATE: 78 BPM | SYSTOLIC BLOOD PRESSURE: 132 MMHG | BODY MASS INDEX: 28.74 KG/M2

## 2021-09-21 DIAGNOSIS — N25.81 SECONDARY HYPERPARATHYROIDISM OF RENAL ORIGIN (H): ICD-10-CM

## 2021-09-21 DIAGNOSIS — I10 ESSENTIAL HYPERTENSION: ICD-10-CM

## 2021-09-21 DIAGNOSIS — D63.1 ANEMIA IN CHRONIC KIDNEY DISEASE, UNSPECIFIED CKD STAGE: ICD-10-CM

## 2021-09-21 DIAGNOSIS — E78.00 HYPERCHOLESTEROLEMIA: ICD-10-CM

## 2021-09-21 DIAGNOSIS — N20.0 RENAL STONE: ICD-10-CM

## 2021-09-21 DIAGNOSIS — E03.9 PRIMARY HYPOTHYROIDISM: Primary | ICD-10-CM

## 2021-09-21 DIAGNOSIS — E53.8 VITAMIN B12 DEFICIENCY (NON ANEMIC): ICD-10-CM

## 2021-09-21 DIAGNOSIS — N18.9 ANEMIA IN CHRONIC KIDNEY DISEASE, UNSPECIFIED CKD STAGE: ICD-10-CM

## 2021-09-21 DIAGNOSIS — M25.50 POLYARTHRALGIA: ICD-10-CM

## 2021-09-21 DIAGNOSIS — R32 URINARY INCONTINENCE, UNSPECIFIED TYPE: ICD-10-CM

## 2021-09-21 DIAGNOSIS — N18.4 CKD (CHRONIC KIDNEY DISEASE) STAGE 4, GFR 15-29 ML/MIN (H): ICD-10-CM

## 2021-09-21 PROBLEM — D64.9 ANEMIA: Status: ACTIVE | Noted: 2019-10-31

## 2021-09-21 LAB
C REACTIVE PROTEIN LHE: 0.4 MG/DL (ref 0–0.8)
ERYTHROCYTE [SEDIMENTATION RATE] IN BLOOD BY WESTERGREN METHOD: 26 MM/HR (ref 0–20)
TSH SERPL DL<=0.005 MIU/L-ACNC: 3.01 UIU/ML (ref 0.3–5)
VIT B12 SERPL-MCNC: 1110 PG/ML (ref 213–816)

## 2021-09-21 PROCEDURE — 90715 TDAP VACCINE 7 YRS/> IM: CPT | Performed by: PEDIATRICS

## 2021-09-21 PROCEDURE — 99214 OFFICE O/P EST MOD 30 MIN: CPT | Mod: 25 | Performed by: PEDIATRICS

## 2021-09-21 PROCEDURE — 86141 C-REACTIVE PROTEIN HS: CPT | Performed by: PEDIATRICS

## 2021-09-21 PROCEDURE — 82607 VITAMIN B-12: CPT | Performed by: PEDIATRICS

## 2021-09-21 PROCEDURE — G0008 ADMIN INFLUENZA VIRUS VAC: HCPCS | Mod: 59 | Performed by: PEDIATRICS

## 2021-09-21 PROCEDURE — 84443 ASSAY THYROID STIM HORMONE: CPT | Performed by: PEDIATRICS

## 2021-09-21 PROCEDURE — 90662 IIV NO PRSV INCREASED AG IM: CPT | Performed by: PEDIATRICS

## 2021-09-21 PROCEDURE — 85652 RBC SED RATE AUTOMATED: CPT | Performed by: PEDIATRICS

## 2021-09-21 PROCEDURE — 90471 IMMUNIZATION ADMIN: CPT | Performed by: PEDIATRICS

## 2021-09-21 PROCEDURE — 36415 COLL VENOUS BLD VENIPUNCTURE: CPT | Performed by: PEDIATRICS

## 2021-09-21 RX ORDER — POLYETHYLENE GLYCOL 3350 17 G/17G
17 POWDER, FOR SOLUTION ORAL
COMMUNITY
Start: 2020-02-28 | End: 2021-09-22

## 2021-09-21 RX ORDER — AMOXICILLIN 250 MG
2 CAPSULE ORAL
COMMUNITY
Start: 2020-02-28 | End: 2021-09-22

## 2021-09-21 ASSESSMENT — MIFFLIN-ST. JEOR: SCORE: 1129.81

## 2021-09-21 NOTE — LETTER
September 23, 2021      Lindsey Bermudez  [address]  [facility] [address]        Dear ,    We are writing to inform you of your test results.    Black Portillo,     It was nice to meet you the other day. Your thyroid test is normal. Your CRP and ESR which are inflammatory markers are normal for your age. These were checked because of joint aches you've been having; there is no evidence of any inflammatory joint disorder causing that pain, which is good. Your B12 level is slightly high. I think it would be reasonable to continue you on this dose and we can just continue to monitor the levels moving forward given it is just slightly high.     Please let me know if you any questions or concerns,     Resulted Orders   TSH with free T4 reflex   Result Value Ref Range    TSH 3.01 0.30 - 5.00 uIU/mL   ESR: Erythrocyte sedimentation rate   Result Value Ref Range    Erythrocyte Sedimentation Rate 26 (H) 0 - 20 mm/hr   CRP, inflammation   Result Value Ref Range    CRP 0.4 0.0-<0.8 mg/dL   Vitamin B12   Result Value Ref Range    Vitamin B12 1,110 (H) 213 - 816 pg/mL       If you have any questions or concerns, please call the clinic at the number listed above.       Sincerely,      Kari Oliveira MD

## 2021-09-21 NOTE — PATIENT INSTRUCTIONS
Go to the lab today. We will notify you with the results once those are available.    In October-November, you can call [phone] to ask about updates regarding the COVID booster and if you qualify at that time. Your 2nd PFIZER vaccine was given 3/29/2021, so you would qualify 8-10 months from your 2nd dose, but still call in October-November as the recommendation regarding when to give the booster vaccine may change by then.    GENERAL INFORMATION AND HELPFUL NUMBERS:    If labs were ordered today, please go to the outpatient lab located in the same building. Once the results are back, we will notify you with results.    If imaging was ordered to be done today, please go to [facility] (located in the same building across our lobby). Once the results are back, we will notify you with results.    If imaging was ordered to be done in the future at an [facility] facility, someone will call to schedule otherwise you may also call [phone] to schedule.    If a specialty referral was placed during today's visit, someone will call to schedule unless you were instructed to call yourself. If you are having issues with this, please message me through [employer] or call our clinic at [phone] (press option 2 to speak with someone from our [facility] team).    If a mammogram was ordered during today's visit, someone will call to schedule otherwise you may also call [phone] to schedule     If a heart ultrasound or stress test was ordered today, someone will call to schedule otherwise you may also call the main Cardiology clinic at [phone] to schedule.    If a bone density scan was ordered today, someone will call to schedule otherwise you may also call [phone] to schedule.    If you have any questions or concerns after our visit today, please message me through [employer] or call our clinic at [phone] (press option 2 to speak with someone from our [facility] team).

## 2021-09-22 PROBLEM — N18.4 CKD (CHRONIC KIDNEY DISEASE) STAGE 4, GFR 15-29 ML/MIN (H): Status: ACTIVE | Noted: 2021-09-22

## 2021-09-22 PROBLEM — R32 URINARY INCONTINENCE, UNSPECIFIED TYPE: Status: ACTIVE | Noted: 2021-09-22

## 2021-09-22 PROBLEM — M25.50 POLYARTHRALGIA: Status: ACTIVE | Noted: 2021-09-22

## 2021-09-23 NOTE — RESULT ENCOUNTER NOTE
Please send a letter with following lab results and message. Thanks - Dr. Antoine    ----    Black Portillo,    It was nice to meet you the other day. Your thyroid test is normal. Your CRP and ESR which are inflammatory markers are normal for your age. These were checked because of joint aches you've been having; there is no evidence of any inflammatory joint disorder causing that pain, which is good. Your B12 level is slightly high. I think it would be reasonable to continue you on this dose and we can just continue to monitor the levels moving forward given it is just slightly high.    Please let me know if you any questions or concerns,  Dr. Antoine

## 2022-01-31 DIAGNOSIS — E78.00 PURE HYPERCHOLESTEROLEMIA: ICD-10-CM

## 2022-01-31 RX ORDER — SIMVASTATIN 20 MG
20 TABLET ORAL AT BEDTIME
Qty: 90 TABLET | Refills: 1 | Status: SHIPPED | OUTPATIENT
Start: 2022-01-31 | End: 2022-06-22

## 2022-01-31 NOTE — TELEPHONE ENCOUNTER
Noted  Kassie Otoole RN 03/22/19 9:12 AM      Pt is requesting RX sent to [facility] for Simvastatin (ZOCOR) 20 mg tablet.    Simvastatin 20 mg tablet    [facility] on [address] in [place] is the preferred pharmacy.

## 2022-02-15 ENCOUNTER — APPOINTMENT (OUTPATIENT)
Dept: RADIOLOGY | Facility: HOSPITAL | Age: 87
DRG: 418 | End: 2022-02-15
Attending: INTERNAL MEDICINE
Payer: MEDICARE

## 2022-02-15 ENCOUNTER — APPOINTMENT (OUTPATIENT)
Dept: RADIOLOGY | Facility: HOSPITAL | Age: 87
DRG: 418 | End: 2022-02-15
Attending: EMERGENCY MEDICINE
Payer: MEDICARE

## 2022-02-15 ENCOUNTER — HOSPITAL ENCOUNTER (INPATIENT)
Facility: HOSPITAL | Age: 87
LOS: 6 days | Discharge: NURSING, REHAB OR LONG-TERM CARE | DRG: 418 | End: 2022-02-21
Attending: EMERGENCY MEDICINE | Admitting: INTERNAL MEDICINE
Payer: MEDICARE

## 2022-02-15 ENCOUNTER — APPOINTMENT (OUTPATIENT)
Dept: ULTRASOUND IMAGING | Facility: HOSPITAL | Age: 87
DRG: 418 | End: 2022-02-15
Attending: EMERGENCY MEDICINE
Payer: MEDICARE

## 2022-02-15 ENCOUNTER — APPOINTMENT (OUTPATIENT)
Dept: CT IMAGING | Facility: HOSPITAL | Age: 87
DRG: 418 | End: 2022-02-15
Attending: EMERGENCY MEDICINE
Payer: MEDICARE

## 2022-02-15 DIAGNOSIS — K80.20 CALCULUS OF GALLBLADDER WITHOUT CHOLECYSTITIS WITHOUT OBSTRUCTION: ICD-10-CM

## 2022-02-15 DIAGNOSIS — K21.00 GASTROESOPHAGEAL REFLUX DISEASE WITH ESOPHAGITIS WITHOUT HEMORRHAGE: ICD-10-CM

## 2022-02-15 DIAGNOSIS — K59.03 DRUG-INDUCED CONSTIPATION: ICD-10-CM

## 2022-02-15 DIAGNOSIS — K81.0 ACUTE CHOLECYSTITIS: Primary | ICD-10-CM

## 2022-02-15 DIAGNOSIS — G47.00 INSOMNIA, UNSPECIFIED TYPE: ICD-10-CM

## 2022-02-15 LAB
ALBUMIN SERPL-MCNC: 3.7 G/DL (ref 3.5–5)
ALP SERPL-CCNC: 163 U/L (ref 45–120)
ALT SERPL W P-5'-P-CCNC: 14 U/L (ref 0–45)
ANION GAP SERPL CALCULATED.3IONS-SCNC: 15 MMOL/L (ref 5–18)
AST SERPL W P-5'-P-CCNC: 15 U/L (ref 0–40)
BASOPHILS # BLD AUTO: 0 10E3/UL (ref 0–0.2)
BASOPHILS NFR BLD AUTO: 1 %
BILIRUB DIRECT SERPL-MCNC: 0.1 MG/DL
BILIRUB SERPL-MCNC: 0.3 MG/DL (ref 0–1)
BUN SERPL-MCNC: 30 MG/DL (ref 8–28)
C REACTIVE PROTEIN LHE: 1.7 MG/DL (ref 0–0.8)
CALCIUM SERPL-MCNC: 8.8 MG/DL (ref 8.5–10.5)
CHLORIDE BLD-SCNC: 107 MMOL/L (ref 98–107)
CO2 SERPL-SCNC: 20 MMOL/L (ref 22–31)
CREAT SERPL-MCNC: 1.61 MG/DL (ref 0.6–1.1)
EOSINOPHIL # BLD AUTO: 0.1 10E3/UL (ref 0–0.7)
EOSINOPHIL NFR BLD AUTO: 3 %
ERYTHROCYTE [DISTWIDTH] IN BLOOD BY AUTOMATED COUNT: 12.4 % (ref 10–15)
GFR SERPL CREATININE-BSD FRML MDRD: 30 ML/MIN/1.73M2
GLUCOSE BLD-MCNC: 100 MG/DL (ref 70–125)
HCT VFR BLD AUTO: 36.5 % (ref 35–47)
HGB BLD-MCNC: 11.4 G/DL (ref 11.7–15.7)
HOLD SPECIMEN: NORMAL
IMM GRANULOCYTES # BLD: 0 10E3/UL
IMM GRANULOCYTES NFR BLD: 0 %
LACTATE SERPL-SCNC: 0.6 MMOL/L (ref 0.7–2)
LIPASE SERPL-CCNC: 46 U/L (ref 0–52)
LYMPHOCYTES # BLD AUTO: 0.9 10E3/UL (ref 0.8–5.3)
LYMPHOCYTES NFR BLD AUTO: 17 %
MCH RBC QN AUTO: 29.9 PG (ref 26.5–33)
MCHC RBC AUTO-ENTMCNC: 31.2 G/DL (ref 31.5–36.5)
MCV RBC AUTO: 96 FL (ref 78–100)
MONOCYTES # BLD AUTO: 0.6 10E3/UL (ref 0–1.3)
MONOCYTES NFR BLD AUTO: 11 %
NEUTROPHILS # BLD AUTO: 3.5 10E3/UL (ref 1.6–8.3)
NEUTROPHILS NFR BLD AUTO: 68 %
NRBC # BLD AUTO: 0 10E3/UL
NRBC BLD AUTO-RTO: 0 /100
PLATELET # BLD AUTO: 282 10E3/UL (ref 150–450)
POTASSIUM BLD-SCNC: 4.8 MMOL/L (ref 3.5–5)
PROT SERPL-MCNC: 6.8 G/DL (ref 6–8)
RBC # BLD AUTO: 3.81 10E6/UL (ref 3.8–5.2)
SARS-COV-2 RNA RESP QL NAA+PROBE: NEGATIVE
SODIUM SERPL-SCNC: 142 MMOL/L (ref 136–145)
WBC # BLD AUTO: 5.1 10E3/UL (ref 4–11)

## 2022-02-15 PROCEDURE — 250N000013 HC RX MED GY IP 250 OP 250 PS 637: Performed by: INTERNAL MEDICINE

## 2022-02-15 PROCEDURE — 120N000001 HC R&B MED SURG/OB

## 2022-02-15 PROCEDURE — G0378 HOSPITAL OBSERVATION PER HR: HCPCS

## 2022-02-15 PROCEDURE — 87635 SARS-COV-2 COVID-19 AMP PRB: CPT | Performed by: EMERGENCY MEDICINE

## 2022-02-15 PROCEDURE — 96372 THER/PROPH/DIAG INJ SC/IM: CPT | Performed by: INTERNAL MEDICINE

## 2022-02-15 PROCEDURE — 99222 1ST HOSP IP/OBS MODERATE 55: CPT | Mod: 57 | Performed by: NURSE PRACTITIONER

## 2022-02-15 PROCEDURE — 82310 ASSAY OF CALCIUM: CPT | Performed by: EMERGENCY MEDICINE

## 2022-02-15 PROCEDURE — 258N000003 HC RX IP 258 OP 636: Performed by: INTERNAL MEDICINE

## 2022-02-15 PROCEDURE — 258N000003 HC RX IP 258 OP 636: Performed by: SURGERY

## 2022-02-15 PROCEDURE — 83605 ASSAY OF LACTIC ACID: CPT | Performed by: EMERGENCY MEDICINE

## 2022-02-15 PROCEDURE — 96365 THER/PROPH/DIAG IV INF INIT: CPT

## 2022-02-15 PROCEDURE — 250N000011 HC RX IP 250 OP 636: Performed by: SURGERY

## 2022-02-15 PROCEDURE — 83690 ASSAY OF LIPASE: CPT | Performed by: EMERGENCY MEDICINE

## 2022-02-15 PROCEDURE — 99223 1ST HOSP IP/OBS HIGH 75: CPT | Mod: AI | Performed by: INTERNAL MEDICINE

## 2022-02-15 PROCEDURE — 250N000011 HC RX IP 250 OP 636: Performed by: EMERGENCY MEDICINE

## 2022-02-15 PROCEDURE — 82248 BILIRUBIN DIRECT: CPT | Performed by: EMERGENCY MEDICINE

## 2022-02-15 PROCEDURE — 74176 CT ABD & PELVIS W/O CONTRAST: CPT

## 2022-02-15 PROCEDURE — 73502 X-RAY EXAM HIP UNI 2-3 VIEWS: CPT

## 2022-02-15 PROCEDURE — 76705 ECHO EXAM OF ABDOMEN: CPT

## 2022-02-15 PROCEDURE — 250N000011 HC RX IP 250 OP 636: Performed by: INTERNAL MEDICINE

## 2022-02-15 PROCEDURE — 36415 COLL VENOUS BLD VENIPUNCTURE: CPT | Performed by: EMERGENCY MEDICINE

## 2022-02-15 PROCEDURE — 93005 ELECTROCARDIOGRAM TRACING: CPT | Performed by: EMERGENCY MEDICINE

## 2022-02-15 PROCEDURE — 85048 AUTOMATED LEUKOCYTE COUNT: CPT | Performed by: EMERGENCY MEDICINE

## 2022-02-15 PROCEDURE — 96361 HYDRATE IV INFUSION ADD-ON: CPT

## 2022-02-15 PROCEDURE — 96366 THER/PROPH/DIAG IV INF ADDON: CPT

## 2022-02-15 PROCEDURE — 71046 X-RAY EXAM CHEST 2 VIEWS: CPT

## 2022-02-15 PROCEDURE — C9803 HOPD COVID-19 SPEC COLLECT: HCPCS

## 2022-02-15 PROCEDURE — 96375 TX/PRO/DX INJ NEW DRUG ADDON: CPT

## 2022-02-15 PROCEDURE — 86140 C-REACTIVE PROTEIN: CPT | Performed by: INTERNAL MEDICINE

## 2022-02-15 PROCEDURE — 250N000013 HC RX MED GY IP 250 OP 250 PS 637: Performed by: SURGERY

## 2022-02-15 PROCEDURE — 250N000013 HC RX MED GY IP 250 OP 250 PS 637: Performed by: FAMILY MEDICINE

## 2022-02-15 PROCEDURE — 99285 EMERGENCY DEPT VISIT HI MDM: CPT | Mod: 25

## 2022-02-15 RX ORDER — LANOLIN ALCOHOL/MO/W.PET/CERES
1000 CREAM (GRAM) TOPICAL DAILY
Status: DISCONTINUED | OUTPATIENT
Start: 2022-02-15 | End: 2022-02-21 | Stop reason: HOSPADM

## 2022-02-15 RX ORDER — ONDANSETRON 4 MG/1
4 TABLET, ORALLY DISINTEGRATING ORAL EVERY 6 HOURS PRN
Status: DISCONTINUED | OUTPATIENT
Start: 2022-02-15 | End: 2022-02-16

## 2022-02-15 RX ORDER — PIPERACILLIN SODIUM, TAZOBACTAM SODIUM 3; .375 G/15ML; G/15ML
3.38 INJECTION, POWDER, LYOPHILIZED, FOR SOLUTION INTRAVENOUS ONCE
Status: COMPLETED | OUTPATIENT
Start: 2022-02-15 | End: 2022-02-15

## 2022-02-15 RX ORDER — LIDOCAINE 40 MG/G
CREAM TOPICAL
Status: DISCONTINUED | OUTPATIENT
Start: 2022-02-15 | End: 2022-02-21 | Stop reason: HOSPADM

## 2022-02-15 RX ORDER — AMOXICILLIN 250 MG
1 CAPSULE ORAL 2 TIMES DAILY
Status: DISCONTINUED | OUTPATIENT
Start: 2022-02-15 | End: 2022-02-21

## 2022-02-15 RX ORDER — HYDRALAZINE HYDROCHLORIDE 20 MG/ML
5 INJECTION INTRAMUSCULAR; INTRAVENOUS EVERY 6 HOURS PRN
Status: DISCONTINUED | OUTPATIENT
Start: 2022-02-15 | End: 2022-02-21 | Stop reason: HOSPADM

## 2022-02-15 RX ORDER — FENTANYL CITRATE 50 UG/ML
50 INJECTION, SOLUTION INTRAMUSCULAR; INTRAVENOUS ONCE
Status: COMPLETED | OUTPATIENT
Start: 2022-02-15 | End: 2022-02-15

## 2022-02-15 RX ORDER — ACETAMINOPHEN 325 MG/1
650 TABLET ORAL EVERY 6 HOURS PRN
Status: DISCONTINUED | OUTPATIENT
Start: 2022-02-15 | End: 2022-02-21 | Stop reason: HOSPADM

## 2022-02-15 RX ORDER — AMLODIPINE BESYLATE 2.5 MG/1
2.5 TABLET ORAL DAILY
Status: DISCONTINUED | OUTPATIENT
Start: 2022-02-15 | End: 2022-02-21 | Stop reason: HOSPADM

## 2022-02-15 RX ORDER — AMOXICILLIN 250 MG
2 CAPSULE ORAL 2 TIMES DAILY
Status: DISCONTINUED | OUTPATIENT
Start: 2022-02-15 | End: 2022-02-21

## 2022-02-15 RX ORDER — ONDANSETRON 4 MG/1
4 TABLET, ORALLY DISINTEGRATING ORAL EVERY 6 HOURS PRN
Status: DISCONTINUED | OUTPATIENT
Start: 2022-02-15 | End: 2022-02-15

## 2022-02-15 RX ORDER — ONDANSETRON 2 MG/ML
4 INJECTION INTRAMUSCULAR; INTRAVENOUS EVERY 6 HOURS PRN
Status: DISCONTINUED | OUTPATIENT
Start: 2022-02-15 | End: 2022-02-16

## 2022-02-15 RX ORDER — HEPARIN SODIUM 5000 [USP'U]/.5ML
5000 INJECTION, SOLUTION INTRAVENOUS; SUBCUTANEOUS EVERY 12 HOURS
Status: DISCONTINUED | OUTPATIENT
Start: 2022-02-15 | End: 2022-02-21 | Stop reason: HOSPADM

## 2022-02-15 RX ORDER — ACETAMINOPHEN 325 MG/1
975 TABLET ORAL EVERY 8 HOURS
Status: DISCONTINUED | OUTPATIENT
Start: 2022-02-15 | End: 2022-02-15

## 2022-02-15 RX ORDER — SODIUM CHLORIDE 9 MG/ML
INJECTION, SOLUTION INTRAVENOUS CONTINUOUS
Status: DISCONTINUED | OUTPATIENT
Start: 2022-02-15 | End: 2022-02-16

## 2022-02-15 RX ORDER — POLYETHYLENE GLYCOL 3350 17 G/17G
17 POWDER, FOR SOLUTION ORAL DAILY
Status: DISCONTINUED | OUTPATIENT
Start: 2022-02-15 | End: 2022-02-16

## 2022-02-15 RX ORDER — ONDANSETRON 2 MG/ML
4 INJECTION INTRAMUSCULAR; INTRAVENOUS EVERY 6 HOURS PRN
Status: DISCONTINUED | OUTPATIENT
Start: 2022-02-15 | End: 2022-02-15

## 2022-02-15 RX ORDER — BISACODYL 10 MG
10 SUPPOSITORY, RECTAL RECTAL DAILY
Status: DISCONTINUED | OUTPATIENT
Start: 2022-02-15 | End: 2022-02-21 | Stop reason: HOSPADM

## 2022-02-15 RX ORDER — ACETAMINOPHEN 650 MG/1
650 SUPPOSITORY RECTAL EVERY 6 HOURS PRN
Status: DISCONTINUED | OUTPATIENT
Start: 2022-02-15 | End: 2022-02-21 | Stop reason: HOSPADM

## 2022-02-15 RX ORDER — PIPERACILLIN SODIUM, TAZOBACTAM SODIUM 3; .375 G/15ML; G/15ML
3.38 INJECTION, POWDER, LYOPHILIZED, FOR SOLUTION INTRAVENOUS EVERY 8 HOURS
Status: DISCONTINUED | OUTPATIENT
Start: 2022-02-15 | End: 2022-02-16

## 2022-02-15 RX ORDER — LEVOTHYROXINE SODIUM 100 UG/1
100 TABLET ORAL
Status: DISCONTINUED | OUTPATIENT
Start: 2022-02-16 | End: 2022-02-21 | Stop reason: HOSPADM

## 2022-02-15 RX ORDER — FOLIC ACID 1 MG/1
1 TABLET ORAL DAILY
Status: DISCONTINUED | OUTPATIENT
Start: 2022-02-15 | End: 2022-02-21 | Stop reason: HOSPADM

## 2022-02-15 RX ADMIN — BISACODYL 10 MG: 10 SUPPOSITORY RECTAL at 17:09

## 2022-02-15 RX ADMIN — DOCUSATE SODIUM 50 MG AND SENNOSIDES 8.6 MG 2 TABLET: 8.6; 5 TABLET, FILM COATED ORAL at 20:59

## 2022-02-15 RX ADMIN — AMLODIPINE BESYLATE 2.5 MG: 2.5 TABLET ORAL at 17:04

## 2022-02-15 RX ADMIN — FENTANYL CITRATE 50 MCG: 50 INJECTION, SOLUTION INTRAMUSCULAR; INTRAVENOUS at 08:31

## 2022-02-15 RX ADMIN — HYDROMORPHONE HYDROCHLORIDE 0.2 MG: 1 INJECTION, SOLUTION INTRAMUSCULAR; INTRAVENOUS; SUBCUTANEOUS at 16:57

## 2022-02-15 RX ADMIN — SODIUM CHLORIDE: 9 INJECTION, SOLUTION INTRAVENOUS at 19:50

## 2022-02-15 RX ADMIN — HEPARIN SODIUM 5000 UNITS: 5000 INJECTION, SOLUTION INTRAVENOUS; SUBCUTANEOUS at 21:00

## 2022-02-15 RX ADMIN — PIPERACILLIN AND TAZOBACTAM 3.38 G: 3; .375 INJECTION, POWDER, LYOPHILIZED, FOR SOLUTION INTRAVENOUS at 14:29

## 2022-02-15 RX ADMIN — CYANOCOBALAMIN TAB 1000 MCG 1000 MCG: 1000 TAB at 17:04

## 2022-02-15 RX ADMIN — FOLIC ACID 1 MG: 1 TABLET ORAL at 17:04

## 2022-02-15 RX ADMIN — POLYETHYLENE GLYCOL 3350 17 G: 17 POWDER, FOR SOLUTION ORAL at 17:04

## 2022-02-15 RX ADMIN — HYDROMORPHONE HYDROCHLORIDE 0.2 MG: 1 INJECTION, SOLUTION INTRAMUSCULAR; INTRAVENOUS; SUBCUTANEOUS at 23:18

## 2022-02-15 RX ADMIN — PIPERACILLIN AND TAZOBACTAM 3.38 G: 3; .375 INJECTION, POWDER, FOR SOLUTION INTRAVENOUS at 21:00

## 2022-02-15 RX ADMIN — HYDROMORPHONE HYDROCHLORIDE 0.2 MG: 1 INJECTION, SOLUTION INTRAMUSCULAR; INTRAVENOUS; SUBCUTANEOUS at 20:59

## 2022-02-15 ASSESSMENT — ENCOUNTER SYMPTOMS
FEVER: 0
FREQUENCY: 1
DIAPHORESIS: 0
DIARRHEA: 0
VOMITING: 0
BACK PAIN: 1
CHILLS: 0
LIGHT-HEADEDNESS: 0
NAUSEA: 0
ABDOMINAL PAIN: 1
DYSURIA: 0
COUGH: 0

## 2022-02-15 ASSESSMENT — MIFFLIN-ST. JEOR
SCORE: 1168.41
SCORE: 1187.46

## 2022-02-15 ASSESSMENT — ACTIVITIES OF DAILY LIVING (ADL)
ADLS_ACUITY_SCORE: 8
DEPENDENT_IADLS:: INDEPENDENT
ADLS_ACUITY_SCORE: 8
ADLS_ACUITY_SCORE: 8

## 2022-02-15 NOTE — H&P
[facility]    History and Physical - Hospitalist Service      Assessment and Plan  Active Problems:    Calculus of gallbladder without cholecystitis without obstruction    89 year old female with a past medical history of diverticulitis, HLD, HTN, CKD4, who presents to the ED  for evaluation of abdominal pain.  She was admitted with    Cholelithiasis with possible cholecystitis  - Patient presented with right-sided abdominal pain but more towards the lower side.  - Abdominal ultrasound showed cholelithiasis with mild gallbladder wall calcificatio was slightly distended common bile duct   - CT abdomen showed cholelithiasis with no bowel obstruction and large stool burden  - No LFTs elevation  - Surgical consult appreciate input  - Plan for HIDA scan versus surgery  - Continue the antibiotics for now    Right side abdominal pain  - Not sure is related to cholelithiasis versus recent fall 1 week ago  - Check x-ray pelvic and right hip  - Continue Tylenol as needed  - Low-dose Dilaudid for breakthrough pain.    Chronic kidney disease stage IV  -Creatinine is about baseline  -Gentle IV hydration overnight  -Avoid nephrotoxic drugs  -Continue to monitor renal function    Accelerated hypertension  - Elevated blood pressure on ER presentation, probably secondary to pain  - Restart home Norvasc  - Add hydralazine IV as needed  - Continue to monitor blood pressure    Constipation  - CT abdomen shows large stool burden  - Start bowel regimen    Hypothyroidism  - Restart home Synthroid    Hyperlipidemia  - Hold statin for now    S/P fall  - Patient denied loss of consciousness or head trauma  - PT/OT evaluation      COVID STATUS: Negative date: 2/15/2022    VTE prophylaxis: Heparin subcu   DIET: Orders Placed This Encounter      Clear Liquid Diet      NPO for Medical/Clinical Reasons Except for: Meds, Ice Chips      Disposition/Barriers to discharge: IV antibiotic, possible surgery  Code Status:Full  Code    HPI:   Lindsey Bermudez is a 89 year old female with a past medical history of diverticulitis, HLD, HTN, CKD4, who presents to this ED via EMS for evaluation of abdominal pain.  Basically patient reports an onset of intermittent, sharp RLQ abdominal pain last night. She was able to fall asleep but then awoke this morning with pain at a 10/10 severity. Pain radiates to her back. Patient denies a history of abdominal surgeries. No nausea, vomiting, or diarrhea.  Of note, patient had a fall one week ago when she was going outside to get the mail. Patient was using her cane and slipped on the ice, falling onto her buttocks. Denies any head trauma. Paramedics were called and helped patient up. Patient was able to ambulate at baseline and after evaluation and no apparent pain they did not recommend further evaluation. She denies any issues since the fall until developing pain last night.   Patient reports chronically having urinary frequency but denies any new urinary frequency, dysuria, or urgency.  She also has constipation and last bowel movement a few days ago    Past Medical History:   Diagnosis Date     Anemia, unspecified     Created by Conversion      CKD (chronic kidney disease) stage 4, GFR 15-29 ml/min (H) 12/11/2015     Disease of thyroid gland     hypothyroidism     Diverticulitis of colon     Created by Conversion  Replacement Utility updated for latest IMO load     Eczema      Hydronephrosis      Hyperlipidemia      Hypertension      Hypothyroidism     Created by Conversion  Replacement Utility updated for latest IMO load     Osteoarthritis     Created by Conversion  Replacement Utility updated for latest IMO load     Skin cancer      Ureteral stone      Past Surgical History:   Procedure Laterality Date     ANKLE SURGERY      plate     BACK SURGERY       BREAST SURGERY      biopsies     CATARACT EXTRACTION       COMBINED CYSTOSCOPY, INSERT STENT URETER(S) N/A 11/9/2015    Procedure: CYSTOSCOPY,  "BLADDER BIOPSY AND FULGERATION.;  Surgeon: Linn Gilbert MD;  Location: [facility] OR;  Service:      IR LUMBAR EPIDURAL STEROID INJECTION  2003     IR LUMBAR EPIDURAL STEROID INJECTION  2004     IR LUMBAR EPIDURAL STEROID INJECTION  2007     IR LUMBAR EPIDURAL STEROID INJECTION  2007     IR NEPHROLITHOTOMY  12/10/2015     IR NEPHROSTOMY TUBE PLACEMENT RIGHT  2015     NEPHROSTOMY W/ INTRODUCTION OF CATHETER Right      Family History   Problem Relation Age of Onset     Cerebrovascular Disease Mother      Heart Disease Father      Dementia Sister      Cerebrovascular Disease Sister      Cancer Sister      Social History     Socioeconomic History     Marital status: Single     Spouse name: Not on file     Number of children: Not on file     Years of education: Not on file     Highest education level: Not on file   Occupational History     Not on file   Tobacco Use     Smoking status: Never Smoker     Smokeless tobacco: Never Used   Substance and Sexual Activity     Alcohol use: Yes     Comment: Alcoholic Drinks/day: rare     Drug use: No     Sexual activity: Never   Other Topics Concern     Not on file   Social History Narrative    Ambulates with cane at baseline. No family in town. Never . No kids. Former  for 42 years. Hobbies: active in womens Worship group, reading and baking        --------    Was previously with  \"Minal\" for a long time.         Lives alone, in a side by side home as part of a larger community of similar homes. Has a sister,  from cancer. Had an older sister who  of old age. 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 Now attends another Worship. Has a cabin in [place] that she goes to in the summer. She has a cleaning person come once a month. In the past she had a bad fall and was in a care center for " several weeks; since then has had monthly help with cleaning. She has a walker that she uses when she goes for appointments or long walks. - Dr. Antoine 09/22/21      Social Determinants of Health     Financial Resource Strain: Not on file   Food Insecurity: Not on file   Transportation Needs: Not on file   Physical Activity: Not on file   Stress: Not on file   Social Connections: Not on file   Intimate Partner Violence: Not on file   Housing Stability: Not on file     Allergies   Allergen Reactions     Adhesive Tape-Silicones [Adhesive Tape] Unknown     Blisters with some bandage like products       PRIOR TO ADMISSION MEDICATIONS   (Not in a hospital admission)       REVIEW OF SYSTEMS:  12 point reviewed pertinent negatives and positives in HPI all others negative     PHYSICAL EXAM  B/P:141/74 T:97.8 P:80 R: 17     Head:    Normocephalic, without obvious abnormality, atraumatic   Eyes:    PERRL, conjunctiva/corneas clear, EOM's intact,both eyes    Ears:    Normal external ear canals no drainage or erythema bilat.   Nose:   Nares normal by gross inspection,  mucosa normal, no drainage or sinus tenderness   Throat:   Lips, mucosa, and tongue normal; teeth and gums normal   Neck:   Supple, symmetrical, trachea midline, no adenopathy;        thyroid:  No enlargement/tenderness/nodules   Back:     Symmetric, no curvature, ROM normal, no CVA tenderness   Lungs:    Decreased breath sounds on lung bases, scattered rhonchi but no wheezing or rales.   Chest wall:    No tenderness or deformity   Heart:    Regular rate and rhythm, S1 and S2 normal, I/VI systolic murmur, no rubs, no JVD, no edema   Abdomen:     Soft, positive tenderness on right lower quadrant, negative Arredondo sign.   Musculoskeletal:   Extremities are warm and non-tender, atraumatic, no joint swelling or tenderness   Pulses:   2+ and symmetric all extremities   Skin:   Skin color, texture, turgor normal, no rashes or lesions on exposed areas, please see nursing  assessment for full skin assessment   Neurologic:  Grossly intact, no focal deficit         PERTINENT LABS and RADIOLOGY   Recent Labs   Lab 02/15/22  0751   WBC 5.1   HGB 11.4*   MCV 96         POTASSIUM 4.8   CHLORIDE 107   CO2 20*   BUN 30*   CR 1.61*   ANIONGAP 15   CHAS 8.8      ALBUMIN 3.7   PROTTOTAL 6.8   BILITOTAL 0.3   ALKPHOS 163*   ALT 14   AST 15   LIPASE 46     Recent Results (from the past 24 hour(s))   CT Abdomen Pelvis w/o Contrast    Narrative    EXAM: CT ABDOMEN PELVIS W/O CONTRAST  LOCATION: [facility]  DATE/TIME: 2/15/2022 8:47 AM    INDICATION: RLQ abdominal pain, appendicitis suspected (Age >= 14y)  COMPARISON: 07/25/2017  TECHNIQUE: CT scan of the abdomen and pelvis was performed without IV contrast. Multiplanar reformats were obtained. Dose reduction techniques were used.  CONTRAST: None.    FINDINGS:   LOWER CHEST: Coronary artery calcifications.    HEPATOBILIARY: Multiple gallstones. No intraductal stones.    PANCREAS: Normal.    SPLEEN: Normal.    ADRENAL GLANDS: Normal.    KIDNEYS/BLADDER: Atrophic right kidney with bilateral nonobstructing intrarenal calculi again noted. Few small cortical cysts are also again noted which need no follow-up. Extrarenal pelvis on the right.    Bladder is mildly distended and has a large amount of air within it.    BOWEL: Extremely redundant colon with large stool burden. The splenic flexure abuts redundant sigmoid. Extensive diverticulosis. No inflammatory changes or bowel obstruction. Appendix is normal.    LYMPH NODES: Normal.    VASCULATURE: Moderate arterial calcifications.    PELVIC ORGANS: Normal.    MUSCULOSKELETAL: Moderate to severe spondylitic changes with marked hypertrophic spurring throughout. No suspicious lesions.      Impression    IMPRESSION:   1.  There is a quite redundant colon with large stool burden.  2.  No evidence of bowel obstruction or appendicitis.  3.  Bladder is mildly distended and  has a large amount of air within it. Query recent catheterization? No findings to indicate a colovesical fistula.  4.  There are punctate bilateral nonobstructing intrarenal calculi with an atrophic right kidney, unchanged.  5.  Cholelithiasis.  6.  Other noncritical findings as noted above.     Abdomen US, limited (RUQ only)    Narrative    EXAM: US ABDOMEN LIMITED  LOCATION: [facility]  DATE/TIME: 2/15/2022 8:54 AM    INDICATION: RUQ pain  COMPARISON: CT earlier today and 2017.  TECHNIQUE: Limited abdominal ultrasound.    FINDINGS: Limited visualization due to patient's body habitus.    GALLBLADDER: Gallbladder is mildly distended with stones resulting in a wall echo shadow appearance. Positive Arredondo's sign.    BILE DUCTS: There is a mild intrahepatic biliary dilatation, unchanged. Common duct measures a centimeter. No intraductal stones visualized.    LIVER: No mass.    RIGHT KIDNEY: Diminutive with 2 simple cortical cysts.    PANCREAS: Obscured due to bowel gas.    No ascites.      Impression    IMPRESSION:  1.  Cholelithiasis as seen on CT. There is also mild gallbladder wall calcification. There is a sonographic Arredondo's sign but no other findings are visible on ultrasound given the numerous stones/wall calcifications. Would suggest a hepatobiliary scan to   evaluate for acute cholecystitis.  2.  Mild intrahepatic biliary dilatation with a slightly distended common bile duct to a centimeter. No intraductal stones visualized. Findings are non significantly changed since the CT from 2017. No intraductal stones or pancreatic lesion on prior CTs.  3.  Atrophied right kidney with 2 small simple cysts.      NOTE: ABNORMAL REPORT    THE DICTATION ABOVE DESCRIBES AN ABNORMALITY FOR WHICH FOLLOW-UP IS NEEDED.    Chest XR,  PA & LAT    Narrative    EXAM: XR CHEST 2 VW  LOCATION: [facility]  DATE/TIME: 2/15/2022 1:44 PM    INDICATION: Preoperative.  COMPARISON:  02/25/2020.      Impression    IMPRESSION: Lungs are clear. No pleural effusion of significant size. Upper normal heart size. Tortuous aorta. Bony demineralization. Advanced degenerative changes shoulders. No significant change since prior.         EKG: Sinus rhythm, nonspecific T wave changes.  No significant acute changes compared to previous EKG.    Discussed with patient, family, family and nursing staff     Abelardo Dhaliwal MD  [facility] Internal Medicine Hospitalist  [phone]

## 2022-02-15 NOTE — CONSULTS
General Surgery Consultation  Lindsey Bermudez MRN# [id number]   Age/Sex: 89 year old female YOB: 1931     Reason for consult: 1. Calculus of gallbladder without cholecystitis without obstruction            Requesting physician: Dr. Sellers                   Assessment and Plan:   Assessment:  Right lower quadrant, hip and pelvic pain that radiates to her lower back of unclear etiology. Patient has some generalized right sided abdominal pain that is most pronounced in the RLQ and along the iliac crest. This does not fit with cholecystitis. No Arredondo's sign was able to be recreated by me, but there is some pain noted in the mid upper abdomen with moderate to deep palpation. Her labs are not indicative of cholecystitis nor is her imaging. She does have cholelithiasis, but labs, imaging and clinical presentation do not correlate for biliary colic or biliary obstruction.     Although cholecystitis is in the differential, I think we need to investigate her hip pain from an ortho standpoint given her recent fall. The mid upper abdominal discomfort could be from compressing her colon with the burden of stool present.  Her pain in the lower right abdomen, hip and back appears to be musculoskeletal given its exacerbation with movement and improvement with rest.     She would likely benefit from her gallbladder coming out given the presence of gallstones but I'm not convinced that it is the current source of her pain. Spoke with hospitalist and we agreed that she remain NPO, get a suppository and get further imaging of her pelvis. We can reevaluate her in the morning.    Plan:  NPO  Possible cholecystectomy versus HIDA scan tomorrow   Will discuss with Dr. Townsend            Chief Complaint:     Chief Complaint   Patient presents with     Abdominal Pain        History is obtained from the patient    HPI:   Lindsey Bermudez is a 89 year old female who presents with RLQ abdominal pain. PMH significant for  "diverticulitis, HLD, HTN, hypothyroidism, and CKD4. Patient presents to the ED with 10/10 right sided lower abdominal pain that radiates along her hip to her back. Pain came on abruptly last night while trying to get into bed. Of note, she fell about a week ago on her \"butt\" in her driveway and had to be picked up by EMS. She was not evaluated in an ED at that time as she was able to get around and ambulate. Patient reports one instance of diarrhea last week but regularly struggles with constipation. She believes that she had a bowel movement 2 days ago, but it was a \"struggle\" to go. She does not report any issues with passing gas. No history of recent post-prandial pain or nausea. No increased pain with deep breaths or band-like pain in the upper abdomen. She is on an 81 mg aspirin per day, but no other anticoagulation.  She denies fever, chills, SOB, CP, acholic or melenotic stools, urinary difficulties, nausea, vomiting, rashes or lesions.     CT shows cholelithiasis, redundant colon and large stool burden. US shows cholelithiasis with some gallbladder wall calcification but no other physical signs of cholecystitis other than a positive sonographic Arredondo's sign reported. Some mild cystic duct dilatation that was present on previous scan.          Past Medical History:     Past Medical History:   Diagnosis Date     Anemia, unspecified     Created by Conversion      CKD (chronic kidney disease) stage 4, GFR 15-29 ml/min (H) 12/11/2015     Disease of thyroid gland     hypothyroidism     Diverticulitis of colon     Created by Conversion  Replacement Utility updated for latest IMO load     Eczema      Hydronephrosis      Hyperlipidemia      Hypertension      Hypothyroidism     Created by Conversion  Replacement Utility updated for latest IMO load     Osteoarthritis     Created by Conversion  Replacement Utility updated for latest IMO load     Skin cancer      Ureteral stone               Past Surgical History: "     Past Surgical History:   Procedure Laterality Date     ANKLE SURGERY      plate     BACK SURGERY       BREAST SURGERY      biopsies     CATARACT EXTRACTION       COMBINED CYSTOSCOPY, INSERT STENT URETER(S) N/A 11/9/2015    Procedure: CYSTOSCOPY, BLADDER BIOPSY AND FULGERATION.;  Surgeon: Linn Gilbert MD;  Location: [facility] OR;  Service:      IR LUMBAR EPIDURAL STEROID INJECTION  5/5/2003     IR LUMBAR EPIDURAL STEROID INJECTION  7/9/2004     IR LUMBAR EPIDURAL STEROID INJECTION  6/1/2007     IR LUMBAR EPIDURAL STEROID INJECTION  6/18/2007     IR NEPHROLITHOTOMY  12/10/2015     IR NEPHROSTOMY TUBE PLACEMENT RIGHT  11/9/2015     NEPHROSTOMY W/ INTRODUCTION OF CATHETER Right              Social History:    reports that she has never smoked. She has never used smokeless tobacco. She reports current alcohol use. She reports that she does not use drugs.           Family History:     Family History   Problem Relation Age of Onset     Cerebrovascular Disease Mother      Heart Disease Father      Dementia Sister      Cerebrovascular Disease Sister      Cancer Sister               Allergies:     Allergies   Allergen Reactions     Adhesive Tape-Silicones [Adhesive Tape] Unknown     Blisters with some bandage like products              Medications:     Prior to Admission medications    Medication Sig Start Date End Date Taking? Authorizing Provider   amLODIPine (NORVASC) 2.5 MG tablet [AMLODIPINE (NORVASC) 2.5 MG TABLET] Take 1 tablet (2.5 mg total) by mouth daily. 10/27/20  Yes Emelia Gomez MD   aspirin 81 MG EC tablet [ASPIRIN 81 MG EC TABLET] Take 81 mg by mouth daily. 12/14/20  Yes Provider, Historical   calcium citrate-vitamin D (CITRACAL+D) 315-200 mg-unit per tablet [CALCIUM CITRATE-VITAMIN D (CITRACAL+D) 315-200 MG-UNIT PER TABLET] Take 1 tablet by mouth 2 (two) times a day. 7/25/17  Yes Provider, Historical   cyanocobalamin 1000 MCG tablet [CYANOCOBALAMIN 1000 MCG TABLET] Take 1 tablet (1,000  "mcg total) by mouth daily. 2/28/20  Yes Lissy Oscar MD   folic acid (FOLVITE) 1 MG tablet [FOLIC ACID (FOLVITE) 1 MG TABLET] Take 1 tablet (1 mg total) by mouth daily. 2/28/20  Yes Lissy Oscar MD   levothyroxine (SYNTHROID, LEVOTHROID) 100 MCG tablet [LEVOTHYROXINE (SYNTHROID, LEVOTHROID) 100 MCG TABLET] TAKE 1 TABLET(100 MCG) BY MOUTH DAILY 10/27/20  Yes Emelia Gomez MD   simvastatin (ZOCOR) 20 MG tablet Take 1 tablet (20 mg) by mouth At Bedtime 1/31/22  Yes Kari Oliveira MD              Review of Systems:   The Review of Systems is negative other than noted in the HPI            Physical Exam:     Patient Vitals for the past 24 hrs:   BP Temp Temp src Pulse Resp SpO2 Height Weight   02/15/22 1230 (!) 150/73 -- -- 75 15 94 % -- --   02/15/22 1215 (!) 145/78 -- -- 81 18 97 % -- --   02/15/22 1200 (!) 174/93 -- -- 83 20 97 % -- --   02/15/22 1145 (!) 153/69 -- -- 91 23 95 % -- --   02/15/22 1130 (!) 164/81 -- -- 79 15 97 % -- --   02/15/22 1115 (!) 152/76 -- -- 75 17 96 % -- --   02/15/22 1100 (!) 155/75 -- -- 78 15 97 % -- --   02/15/22 1045 139/65 -- -- 76 14 97 % -- --   02/15/22 1030 (!) 140/69 -- -- 73 17 94 % -- --   02/15/22 1015 (!) 148/72 -- -- 76 14 96 % -- --   02/15/22 1000 (!) 141/65 -- -- 73 15 96 % -- --   02/15/22 0945 138/57 -- -- 76 16 95 % -- --   02/15/22 0930 (!) 149/67 -- -- 79 14 91 % -- --   02/15/22 0915 (!) 180/83 -- -- -- -- -- -- --   02/15/22 0830 (!) 149/69 -- -- 69 13 96 % -- --   02/15/22 0815 (!) 168/76 -- -- 70 15 96 % -- --   02/15/22 0800 (!) 199/88 -- -- 74 28 97 % -- --   02/15/22 0752 -- -- -- -- -- -- 1.651 m (5' 5\") 76.7 kg (169 lb)   02/15/22 0745 (!) 214/103 97.8  F (36.6  C) Oral 84 16 98 % -- --        No intake or output data in the 24 hours ending 02/15/22 1347   Constitutional:   awake, alert, cooperative, no apparent distress, and appears stated age       Eyes:   PERRL, conjunctiva/corneas clear, EOM's intact; no scleral edema or icterus noted  "       ENT:   Normocephalic, without obvious abnormality, atraumatic, Lips, mucosa, and tongue normal        Lungs:   Normal respiratory effort, no accessory muscle use       Cardiovascular:   Regular rate and rhythm       Abdomen:   Soft, not distended, tender with moderate to deep palpation in mid upper abdomen; tender in RLQ near pelvis without guarding or rebound tenderness; negative Arredondo's sign       Musculoskeletal:   No obvious swelling, bruising or deformity; exquisite tenderness along right iliac crest       Skin:   Skin color and texture normal for patient, no rashes or lesions              Data:         All imaging studies reviewed by me.    Results for orders placed or performed during the hospital encounter of 02/15/22 (from the past 24 hour(s))   [facility] Draw    Narrative    The following orders were created for panel order [facility].  Procedure                               Abnormality         Status                     ---------                               -----------         ------                     Extra Blue Top Tube[[id number]]                              Final result               Extra Red Top Tube[[id number]]                               Final result               Extra Green Top (Lithium...[[id number]]                      Final result               Extra Purple Top Tube[[id number]]                            Final result                 Please view results for these tests on the individual orders.   Extra Blue Top Tube   Result Value Ref Range    Hold Specimen JIC    Extra Red Top Tube   Result Value Ref Range    Hold Specimen JIC    Extra Green Top (Lithium Heparin) Tube   Result Value Ref Range    Hold Specimen JIC    Extra Purple Top Tube   Result Value Ref Range    Hold Specimen JIC    CBC with platelets differential    Narrative    The following orders were created for panel order CBC with platelets differential.  Procedure                               Abnormality         Status                      ---------                               -----------         ------                     CBC with platelets and d...[[id number]]  Abnormal            Final result                 Please view results for these tests on the individual orders.   Basic metabolic panel   Result Value Ref Range    Sodium 142 136 - 145 mmol/L    Potassium 4.8 3.5 - 5.0 mmol/L    Chloride 107 98 - 107 mmol/L    Carbon Dioxide (CO2) 20 (L) 22 - 31 mmol/L    Anion Gap 15 5 - 18 mmol/L    Urea Nitrogen 30 (H) 8 - 28 mg/dL    Creatinine 1.61 (H) 0.60 - 1.10 mg/dL    Calcium 8.8 8.5 - 10.5 mg/dL    Glucose 100 70 - 125 mg/dL    GFR Estimate 30 (L) >60 mL/min/1.73m2   Lipase   Result Value Ref Range    Lipase 46 0 - 52 U/L   Hepatic function panel   Result Value Ref Range    Bilirubin Total 0.3 0.0 - 1.0 mg/dL    Bilirubin Direct 0.1 <=0.5 mg/dL    Protein Total 6.8 6.0 - 8.0 g/dL    Albumin 3.7 3.5 - 5.0 g/dL    Alkaline Phosphatase 163 (H) 45 - 120 U/L    AST 15 0 - 40 U/L    ALT 14 0 - 45 U/L   CBC with platelets and differential   Result Value Ref Range    WBC Count 5.1 4.0 - 11.0 10e3/uL    RBC Count 3.81 3.80 - 5.20 10e6/uL    Hemoglobin 11.4 (L) 11.7 - 15.7 g/dL    Hematocrit 36.5 35.0 - 47.0 %    MCV 96 78 - 100 fL    MCH 29.9 26.5 - 33.0 pg    MCHC 31.2 (L) 31.5 - 36.5 g/dL    RDW 12.4 10.0 - 15.0 %    Platelet Count 282 150 - 450 10e3/uL    % Neutrophils 68 %    % Lymphocytes 17 %    % Monocytes 11 %    % Eosinophils 3 %    % Basophils 1 %    % Immature Granulocytes 0 %    NRBCs per 100 WBC 0 <1 /100    Absolute Neutrophils 3.5 1.6 - 8.3 10e3/uL    Absolute Lymphocytes 0.9 0.8 - 5.3 10e3/uL    Absolute Monocytes 0.6 0.0 - 1.3 10e3/uL    Absolute Eosinophils 0.1 0.0 - 0.7 10e3/uL    Absolute Basophils 0.0 0.0 - 0.2 10e3/uL    Absolute Immature Granulocytes 0.0 <=0.4 10e3/uL    Absolute NRBCs 0.0 10e3/uL   Lactic acid whole blood   Result Value Ref Range    Lactic Acid 0.6 (L) 0.7 - 2.0 mmol/L   CT Abdomen Pelvis w/o  Contrast    Narrative    EXAM: CT ABDOMEN PELVIS W/O CONTRAST  LOCATION: [facility]  DATE/TIME: 2/15/2022 8:47 AM    INDICATION: RLQ abdominal pain, appendicitis suspected (Age >= 14y)  COMPARISON: 07/25/2017  TECHNIQUE: CT scan of the abdomen and pelvis was performed without IV contrast. Multiplanar reformats were obtained. Dose reduction techniques were used.  CONTRAST: None.    FINDINGS:   LOWER CHEST: Coronary artery calcifications.    HEPATOBILIARY: Multiple gallstones. No intraductal stones.    PANCREAS: Normal.    SPLEEN: Normal.    ADRENAL GLANDS: Normal.    KIDNEYS/BLADDER: Atrophic right kidney with bilateral nonobstructing intrarenal calculi again noted. Few small cortical cysts are also again noted which need no follow-up. Extrarenal pelvis on the right.    Bladder is mildly distended and has a large amount of air within it.    BOWEL: Extremely redundant colon with large stool burden. The splenic flexure abuts redundant sigmoid. Extensive diverticulosis. No inflammatory changes or bowel obstruction. Appendix is normal.    LYMPH NODES: Normal.    VASCULATURE: Moderate arterial calcifications.    PELVIC ORGANS: Normal.    MUSCULOSKELETAL: Moderate to severe spondylitic changes with marked hypertrophic spurring throughout. No suspicious lesions.      Impression    IMPRESSION:   1.  There is a quite redundant colon with large stool burden.  2.  No evidence of bowel obstruction or appendicitis.  3.  Bladder is mildly distended and has a large amount of air within it. Query recent catheterization? No findings to indicate a colovesical fistula.  4.  There are punctate bilateral nonobstructing intrarenal calculi with an atrophic right kidney, unchanged.  5.  Cholelithiasis.  6.  Other noncritical findings as noted above.     Abdomen US, limited (RUQ only)    Narrative    EXAM: US ABDOMEN LIMITED  LOCATION: [facility]  DATE/TIME: 2/15/2022 8:54 AM    INDICATION:  RUQ pain  COMPARISON: CT earlier today and 2017.  TECHNIQUE: Limited abdominal ultrasound.    FINDINGS: Limited visualization due to patient's body habitus.    GALLBLADDER: Gallbladder is mildly distended with stones resulting in a wall echo shadow appearance. Positive Arredondo's sign.    BILE DUCTS: There is a mild intrahepatic biliary dilatation, unchanged. Common duct measures a centimeter. No intraductal stones visualized.    LIVER: No mass.    RIGHT KIDNEY: Diminutive with 2 simple cortical cysts.    PANCREAS: Obscured due to bowel gas.    No ascites.      Impression    IMPRESSION:  1.  Cholelithiasis as seen on CT. There is also mild gallbladder wall calcification. There is a sonographic Arredondo's sign but no other findings are visible on ultrasound given the numerous stones/wall calcifications. Would suggest a hepatobiliary scan to   evaluate for acute cholecystitis.  2.  Mild intrahepatic biliary dilatation with a slightly distended common bile duct to a centimeter. No intraductal stones visualized. Findings are non significantly changed since the CT from 2017. No intraductal stones or pancreatic lesion on prior CTs.  3.  Atrophied right kidney with 2 small simple cysts.      NOTE: ABNORMAL REPORT    THE DICTATION ABOVE DESCRIBES AN ABNORMALITY FOR WHICH FOLLOW-UP IS NEEDED.         Mercedez Wayne, APRN CNP

## 2022-02-15 NOTE — PHARMACY-ADMISSION MEDICATION HISTORY
Pharmacy Note - Admission Medication History    Pertinent Provider Information: none     ______________________________________________________________________    Prior To Admission (PTA) med list completed and updated in EMR.       PTA Med List   Medication Sig Last Dose     amLODIPine (NORVASC) 2.5 MG tablet [AMLODIPINE (NORVASC) 2.5 MG TABLET] Take 1 tablet (2.5 mg total) by mouth daily. 2/14/2022 at am     aspirin 81 MG EC tablet [ASPIRIN 81 MG EC TABLET] Take 81 mg by mouth daily. 2/15/2022 at am     calcium citrate-vitamin D (CITRACAL+D) 315-200 mg-unit per tablet [CALCIUM CITRATE-VITAMIN D (CITRACAL+D) 315-200 MG-UNIT PER TABLET] Take 1 tablet by mouth 2 (two) times a day. 2/14/2022 at am     cyanocobalamin 1000 MCG tablet [CYANOCOBALAMIN 1000 MCG TABLET] Take 1 tablet (1,000 mcg total) by mouth daily. 2/14/2022 at am     folic acid (FOLVITE) 1 MG tablet [FOLIC ACID (FOLVITE) 1 MG TABLET] Take 1 tablet (1 mg total) by mouth daily. 2/14/2022 at am     levothyroxine (SYNTHROID, LEVOTHROID) 100 MCG tablet [LEVOTHYROXINE (SYNTHROID, LEVOTHROID) 100 MCG TABLET] TAKE 1 TABLET(100 MCG) BY MOUTH DAILY 2/15/2022 at am     simvastatin (ZOCOR) 20 MG tablet Take 1 tablet (20 mg) by mouth At Bedtime 2/14/2022 at        Information source(s): Patient, Clinic records and [facility]  Method of interview communication: in-person    Summary of Changes to PTA Med List  New: nothing new  Discontinued: nothing   Changed: nothing     Patient was asked about OTC/herbal products specifically.  PTA med list reflects this.    In the past week, patient estimated taking medication this percent of the time:  greater than 90%.    Allergies were reviewed, assessed, and updated with the patient.      Patient did not bring any medications to the hospital and can't retrieve from home. No multi-dose medications are available for use during hospital stay.     The information provided in this note is only as accurate as the  sources available at the time of the update(s).    Thank you for the opportunity to participate in the care of this patient.    [facility], located within [facility]  2/15/2022 1:11 PM

## 2022-02-15 NOTE — ED PROVIDER NOTES
EMERGENCY DEPARTMENT ENCOUNTER      NAME: Lindsey Bermudez  AGE: 89 year old female  YOB: 1931  MRN: [id number]  EVALUATION DATE & TIME: 2/15/2022  7:41 AM    PCP: Kari Oliveira    ED PROVIDER: Nu Sellers MD      Chief Complaint   Patient presents with     Abdominal Pain         FINAL IMPRESSION:  1. Calculus of gallbladder without cholecystitis without obstruction          ED COURSE & MEDICAL DECISION MAKING:    Pertinent Labs & Imaging studies reviewed. (See chart for details)  89 year old female presents to the Emergency Department for evaluation of right-sided abdominal pain.  Initially, the patient reported right lower quadrant pain, but on my exam, she had right lower quadrant and right upper quadrant tenderness.  CT scan of the abdomen pelvis demonstrates a mildly distended bladder, cholelithiasis.  Right upper quadrant ultrasound demonstrates cholelithiasis, mild gallbladder wall calcification, positive sonographic Arredondo sign and mild intrahepatic biliary dilatation with a slightly distended common bile duct to a centimeter.  LFTs with slight elevation in alkaline phosphatase, otherwise no other abnormalities.  I spoke with surgery, Dr. Townsend.  Recommend IV antibiotics and will likely plan for cholecystectomy today or tomorrow.  The hospitalist agrees with plan for admission and evaluation by surgery.    7:41 AM I met with the patient to gather history and to perform my initial exam. I discussed the plan for care while in the Emergency Department. PPE (gloves, eye protection, N95 mask) was worn by me during patient encounters while patient wore mask.   10:51 AM I re-evaluated and updated patient. Reports continued right sided abdominal pain.  12:47 PM I discussed patient's case with Dr. Townsend, surgery. Will plan for cholecystectomy.  12:59 PM I discussed the case with hospitalist, Dr. Dhaliwal, who accepts the patient for admission.   1:01 PM I discussed patient's case with  surgery ALBERT. Will come and assess the patient.    At the conclusion of the encounter I discussed the results of all of the tests and the disposition. The questions were answered. The patient or family acknowledged understanding and was agreeable with the care plan.         MEDICATIONS GIVEN IN THE EMERGENCY:  Medications   piperacillin-tazobactam (ZOSYN) 3.375 g vial to attach to  mL bag (has no administration in time range)   fentaNYL (PF) (SUBLIMAZE) injection 50 mcg (50 mcg Intravenous Given 2/15/22 0831)       NEW PRESCRIPTIONS STARTED AT TODAY'S ER VISIT  New Prescriptions    No medications on file       =================================================================    HPI    Patient information was obtained from: Patient    Use of : N/A         Lindsey Bermudez is a 89 year old female with a pertinent history of diverticulitis, HLD, HTN, CKD4, who presents to this ED via EMS for evaluation of abdominal pain.    Patient reports an onset of intermittent, sharp RLQ abdominal pain last night. She was able to fall asleep but then awoke this morning with pain at a 10/10 severity. Pain radiates to her back. Patient denies a history of abdominal surgeries. No nausea, vomiting, or diarrhea.    Of note, patient had a fall one week ago when she was going outside to get the mail. Patient was using her cane and slipped on the ice, falling onto her buttocks. Denies any head trauma. Paramedics were called and helped patient up. Patient was able to ambulate at baseline and after evaluation and no apparent pain they did not recommend further evaluation. She denies any issues since the fall until developing pain last night.     Patient reports chronically having urinary frequency but denies any new urinary frequency, dysuria, or urgency. She otherwise denies fever, chills, sweats, lightheadedness, cough, congestion, or additional medical concerns or complaints at this time.      REVIEW OF SYSTEMS   Review of  Systems   Constitutional: Negative for chills, diaphoresis and fever.   HENT: Negative for congestion.         Negative for head trauma.   Respiratory: Negative for cough.    Gastrointestinal: Positive for abdominal pain (RLQ; sharp; intermittent). Negative for diarrhea, nausea and vomiting.   Genitourinary: Positive for frequency (chronic). Negative for dysuria and urgency.   Musculoskeletal: Positive for back pain.        Positive for mechanical fall.   Neurological: Negative for light-headedness.   All other systems reviewed and are negative.     PAST MEDICAL HISTORY:  Past Medical History:   Diagnosis Date     Anemia, unspecified     Created by Conversion      CKD (chronic kidney disease) stage 4, GFR 15-29 ml/min (H) 12/11/2015     Disease of thyroid gland     hypothyroidism     Diverticulitis of colon     Created by Conversion  Replacement Utility updated for latest IMO load     Eczema      Hydronephrosis      Hyperlipidemia      Hypertension      Hypothyroidism     Created by Conversion  Replacement Utility updated for latest IMO load     Osteoarthritis     Created by Conversion  Replacement Utility updated for latest IMO load     Skin cancer      Ureteral stone        PAST SURGICAL HISTORY:  Past Surgical History:   Procedure Laterality Date     ANKLE SURGERY      plate     BACK SURGERY       BREAST SURGERY      biopsies     CATARACT EXTRACTION       COMBINED CYSTOSCOPY, INSERT STENT URETER(S) N/A 11/9/2015    Procedure: CYSTOSCOPY, BLADDER BIOPSY AND FULGERATION.;  Surgeon: Linn Gilbert MD;  Location: [facility] OR;  Service:      IR LUMBAR EPIDURAL STEROID INJECTION  5/5/2003     IR LUMBAR EPIDURAL STEROID INJECTION  7/9/2004     IR LUMBAR EPIDURAL STEROID INJECTION  6/1/2007     IR LUMBAR EPIDURAL STEROID INJECTION  6/18/2007     IR NEPHROLITHOTOMY  12/10/2015     IR NEPHROSTOMY TUBE PLACEMENT RIGHT  11/9/2015     NEPHROSTOMY W/ INTRODUCTION OF CATHETER Right            CURRENT MEDICATIONS:  "   amLODIPine (NORVASC) 2.5 MG tablet  aspirin 81 MG EC tablet  calcium citrate-vitamin D (CITRACAL+D) 315-200 mg-unit per tablet  cyanocobalamin 1000 MCG tablet  folic acid (FOLVITE) 1 MG tablet  levothyroxine (SYNTHROID, LEVOTHROID) 100 MCG tablet  simvastatin (ZOCOR) 20 MG tablet        ALLERGIES:  Allergies   Allergen Reactions     Adhesive Tape-Silicones [Adhesive Tape] Unknown     Blisters with some bandage like products       FAMILY HISTORY:  Family History   Problem Relation Age of Onset     Cerebrovascular Disease Mother      Heart Disease Father      Dementia Sister      Cerebrovascular Disease Sister      Cancer Sister        SOCIAL HISTORY:   Social History     Socioeconomic History     Marital status: Single     Spouse name: Not on file     Number of children: Not on file     Years of education: Not on file     Highest education level: Not on file   Occupational History     Not on file   Tobacco Use     Smoking status: Never Smoker     Smokeless tobacco: Never Used   Substance and Sexual Activity     Alcohol use: Yes     Comment: Alcoholic Drinks/day: rare     Drug use: No     Sexual activity: Never   Other Topics Concern     Not on file   Social History Narrative    Ambulates with cane at baseline. No family in town. Never . No kids. Former  for 42 years. Hobbies: active in womens [employer] group, reading and baking        --------    Was previously with  \"Minal\" for a long time.         Lives alone, in a side by side home as part of a larger community of similar homes. Has a sister,  from cancer. Had an older sister who  of old age. 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 Now attends another [employer]. Has a cabin in [place] that she goes to in the summer. She has a cleaning person come once a month. In the past she had a bad fall and was in a " "care center for several weeks; since then has had monthly help with cleaning. She has a walker that she uses when she goes for appointments or long walks. - Dr. Antoine 09/22/21      Social Determinants of Health     Financial Resource Strain: Not on file   Food Insecurity: Not on file   Transportation Needs: Not on file   Physical Activity: Not on file   Stress: Not on file   Social Connections: Not on file   Intimate Partner Violence: Not on file   Housing Stability: Not on file       VITALS:  BP (!) 193/90   Pulse 94   Temp 97.8  F (36.6  C) (Oral)   Resp 19   Ht 1.651 m (5' 5\")   Wt 76.7 kg (169 lb)   SpO2 97%   BMI 28.12 kg/m      PHYSICAL EXAM    Constitutional: Well developed, Well nourished, NAD  HENT: Normocephalic, Atraumatic, Bilateral external ears normal, Oropharynx normal, mucous membranes moist, Nose normal.   Neck- Normal range of motion, No tenderness, Supple, No stridor.  Eyes: PERRL, EOMI, Conjunctiva normal, No discharge.   Respiratory: Normal breath sounds, No respiratory distress  Cardiovascular: Normal heart rate, Regular rhythm  GI: Bowel sounds normal, Soft, RLQ and RUQ tenderness. No CVA tenderness.  Musculoskeletal: No edema. Good range of motion in all major joints. No tenderness to palpation or major deformities noted.   Integument: Warm, Dry, No erythema, No rash  Neurologic: Alert & oriented x 3, Normal motor function, Normal sensory function, No focal deficits noted. Normal gait.   Psychiatric: Affect normal, Judgment normal, Mood normal.      LAB:  All pertinent labs reviewed and interpreted.  Results for orders placed or performed during the hospital encounter of 02/15/22   CT Abdomen Pelvis w/o Contrast    Impression    IMPRESSION:   1.  There is a quite redundant colon with large stool burden.  2.  No evidence of bowel obstruction or appendicitis.  3.  Bladder is mildly distended and has a large amount of air within it. Query recent catheterization? No findings to indicate a " colovesical fistula.  4.  There are punctate bilateral nonobstructing intrarenal calculi with an atrophic right kidney, unchanged.  5.  Cholelithiasis.  6.  Other noncritical findings as noted above.     Abdomen US, limited (RUQ only)    Impression    IMPRESSION:  1.  Cholelithiasis as seen on CT. There is also mild gallbladder wall calcification. There is a sonographic Arredondo's sign but no other findings are visible on ultrasound given the numerous stones/wall calcifications. Would suggest a hepatobiliary scan to   evaluate for acute cholecystitis.  2.  Mild intrahepatic biliary dilatation with a slightly distended common bile duct to a centimeter. No intraductal stones visualized. Findings are non significantly changed since the CT from 2017. No intraductal stones or pancreatic lesion on prior CTs.  3.  Atrophied right kidney with 2 small simple cysts.      NOTE: ABNORMAL REPORT    THE DICTATION ABOVE DESCRIBES AN ABNORMALITY FOR WHICH FOLLOW-UP IS NEEDED.    Extra Blue Top Tube   Result Value Ref Range    Hold Specimen JIC    Extra Red Top Tube   Result Value Ref Range    Hold Specimen JIC    Extra Green Top (Lithium Heparin) Tube   Result Value Ref Range    Hold Specimen JIC    Extra Purple Top Tube   Result Value Ref Range    Hold Specimen JIC    Basic metabolic panel   Result Value Ref Range    Sodium 142 136 - 145 mmol/L    Potassium 4.8 3.5 - 5.0 mmol/L    Chloride 107 98 - 107 mmol/L    Carbon Dioxide (CO2) 20 (L) 22 - 31 mmol/L    Anion Gap 15 5 - 18 mmol/L    Urea Nitrogen 30 (H) 8 - 28 mg/dL    Creatinine 1.61 (H) 0.60 - 1.10 mg/dL    Calcium 8.8 8.5 - 10.5 mg/dL    Glucose 100 70 - 125 mg/dL    GFR Estimate 30 (L) >60 mL/min/1.73m2   Result Value Ref Range    Lipase 46 0 - 52 U/L   Hepatic function panel   Result Value Ref Range    Bilirubin Total 0.3 0.0 - 1.0 mg/dL    Bilirubin Direct 0.1 <=0.5 mg/dL    Protein Total 6.8 6.0 - 8.0 g/dL    Albumin 3.7 3.5 - 5.0 g/dL    Alkaline Phosphatase 163 (H) 45 -  120 U/L    AST 15 0 - 40 U/L    ALT 14 0 - 45 U/L   Lactic acid whole blood   Result Value Ref Range    Lactic Acid 0.6 (L) 0.7 - 2.0 mmol/L   CBC with platelets and differential   Result Value Ref Range    WBC Count 5.1 4.0 - 11.0 10e3/uL    RBC Count 3.81 3.80 - 5.20 10e6/uL    Hemoglobin 11.4 (L) 11.7 - 15.7 g/dL    Hematocrit 36.5 35.0 - 47.0 %    MCV 96 78 - 100 fL    MCH 29.9 26.5 - 33.0 pg    MCHC 31.2 (L) 31.5 - 36.5 g/dL    RDW 12.4 10.0 - 15.0 %    Platelet Count 282 150 - 450 10e3/uL    % Neutrophils 68 %    % Lymphocytes 17 %    % Monocytes 11 %    % Eosinophils 3 %    % Basophils 1 %    % Immature Granulocytes 0 %    NRBCs per 100 WBC 0 <1 /100    Absolute Neutrophils 3.5 1.6 - 8.3 10e3/uL    Absolute Lymphocytes 0.9 0.8 - 5.3 10e3/uL    Absolute Monocytes 0.6 0.0 - 1.3 10e3/uL    Absolute Eosinophils 0.1 0.0 - 0.7 10e3/uL    Absolute Basophils 0.0 0.0 - 0.2 10e3/uL    Absolute Immature Granulocytes 0.0 <=0.4 10e3/uL    Absolute NRBCs 0.0 10e3/uL       RADIOLOGY:  Reviewed all pertinent imaging. Please see official radiology report.  Abdomen US, limited (RUQ only)   Final Result   IMPRESSION:   1.  Cholelithiasis as seen on CT. There is also mild gallbladder wall calcification. There is a sonographic Arredondo's sign but no other findings are visible on ultrasound given the numerous stones/wall calcifications. Would suggest a hepatobiliary scan to    evaluate for acute cholecystitis.   2.  Mild intrahepatic biliary dilatation with a slightly distended common bile duct to a centimeter. No intraductal stones visualized. Findings are non significantly changed since the CT from 2017. No intraductal stones or pancreatic lesion on prior CTs.   3.  Atrophied right kidney with 2 small simple cysts.         NOTE: ABNORMAL REPORT      THE DICTATION ABOVE DESCRIBES AN ABNORMALITY FOR WHICH FOLLOW-UP IS NEEDED.       CT Abdomen Pelvis w/o Contrast   Final Result   IMPRESSION:    1.  There is a quite redundant colon  with large stool burden.   2.  No evidence of bowel obstruction or appendicitis.   3.  Bladder is mildly distended and has a large amount of air within it. Query recent catheterization? No findings to indicate a colovesical fistula.   4.  There are punctate bilateral nonobstructing intrarenal calculi with an atrophic right kidney, unchanged.   5.  Cholelithiasis.   6.  Other noncritical findings as noted above.         Chest XR,  PA & LAT    (Results Pending)     EKG  Time: 13:52  Rate: 86 bpm  QRS: 90 ms  QTC: 461 ms    Comparison: Compared to previous from  2/25/20: sinus rhythm, WI interval has decreased    I have independently reviewed and interpreted this ECG and agree with the above findings. See scanned document for further details.       I, Myah Garcia, am serving as a scribe to document services personally performed by Nu Sellers MD, based on my observation and the provider's statements to me. I, Nu Sellers MD, attest that Myah Garcia is acting in a scribe capacity, has observed my performance of the services and has documented them in accordance with my direction.    Nu Sellers MD  Emergency Medicine  [facility] EMERGENCY DEPARTMENT  [facility] [facility] [address] [phone]     Nu Sellers MD  02/15/22 7111

## 2022-02-15 NOTE — ED TRIAGE NOTES
Brought in by ambulance from home for sharp RLQ pain that started last night and has been intermittent since then. Pain this morning was 10/10 so EMS was called. Hypertensive on arrival 214/103.

## 2022-02-15 NOTE — ED NOTES
[facility]  Expected date:   Expected time:   Means of arrival: Ambulance  Comments:  MPLW: RLQ pain

## 2022-02-16 ENCOUNTER — ANESTHESIA EVENT (OUTPATIENT)
Dept: SURGERY | Facility: HOSPITAL | Age: 87
DRG: 418 | End: 2022-02-16
Payer: MEDICARE

## 2022-02-16 ENCOUNTER — ANESTHESIA (OUTPATIENT)
Dept: SURGERY | Facility: HOSPITAL | Age: 87
DRG: 418 | End: 2022-02-16
Payer: MEDICARE

## 2022-02-16 LAB
ALBUMIN SERPL-MCNC: 3.1 G/DL (ref 3.5–5)
ALP SERPL-CCNC: 155 U/L (ref 45–120)
ALT SERPL W P-5'-P-CCNC: 22 U/L (ref 0–45)
ANION GAP SERPL CALCULATED.3IONS-SCNC: 15 MMOL/L (ref 5–18)
AST SERPL W P-5'-P-CCNC: 29 U/L (ref 0–40)
ATRIAL RATE - MUSE: 86 BPM
BILIRUB DIRECT SERPL-MCNC: 0.2 MG/DL
BILIRUB SERPL-MCNC: 0.6 MG/DL (ref 0–1)
BUN SERPL-MCNC: 30 MG/DL (ref 8–28)
CALCIUM SERPL-MCNC: 8.2 MG/DL (ref 8.5–10.5)
CHLORIDE BLD-SCNC: 110 MMOL/L (ref 98–107)
CO2 SERPL-SCNC: 17 MMOL/L (ref 22–31)
CREAT SERPL-MCNC: 1.93 MG/DL (ref 0.6–1.1)
DIASTOLIC BLOOD PRESSURE - MUSE: 83 MMHG
ERYTHROCYTE [DISTWIDTH] IN BLOOD BY AUTOMATED COUNT: 12.6 % (ref 10–15)
GFR SERPL CREATININE-BSD FRML MDRD: 24 ML/MIN/1.73M2
GLUCOSE BLD-MCNC: 66 MG/DL (ref 70–125)
HCT VFR BLD AUTO: 32.6 % (ref 35–47)
HGB BLD-MCNC: 10.2 G/DL (ref 11.7–15.7)
INTERPRETATION ECG - MUSE: NORMAL
MCH RBC QN AUTO: 30.2 PG (ref 26.5–33)
MCHC RBC AUTO-ENTMCNC: 31.3 G/DL (ref 31.5–36.5)
MCV RBC AUTO: 96 FL (ref 78–100)
P AXIS - MUSE: 44 DEGREES
PLATELET # BLD AUTO: 227 10E3/UL (ref 150–450)
POTASSIUM BLD-SCNC: 4.4 MMOL/L (ref 3.5–5)
PR INTERVAL - MUSE: 166 MS
PROT SERPL-MCNC: 6.2 G/DL (ref 6–8)
QRS DURATION - MUSE: 90 MS
QT - MUSE: 386 MS
QTC - MUSE: 461 MS
R AXIS - MUSE: -26 DEGREES
RBC # BLD AUTO: 3.38 10E6/UL (ref 3.8–5.2)
SODIUM SERPL-SCNC: 142 MMOL/L (ref 136–145)
SYSTOLIC BLOOD PRESSURE - MUSE: 138 MMHG
T AXIS - MUSE: 26 DEGREES
VENTRICULAR RATE- MUSE: 86 BPM
WBC # BLD AUTO: 6.1 10E3/UL (ref 4–11)

## 2022-02-16 PROCEDURE — 250N000013 HC RX MED GY IP 250 OP 250 PS 637: Performed by: SURGERY

## 2022-02-16 PROCEDURE — 272N000001 HC OR GENERAL SUPPLY STERILE: Performed by: SURGERY

## 2022-02-16 PROCEDURE — 250N000013 HC RX MED GY IP 250 OP 250 PS 637: Performed by: FAMILY MEDICINE

## 2022-02-16 PROCEDURE — 258N000003 HC RX IP 258 OP 636: Performed by: SURGERY

## 2022-02-16 PROCEDURE — 250N000011 HC RX IP 250 OP 636: Performed by: SURGERY

## 2022-02-16 PROCEDURE — 250N000013 HC RX MED GY IP 250 OP 250 PS 637: Performed by: INTERNAL MEDICINE

## 2022-02-16 PROCEDURE — 258N000003 HC RX IP 258 OP 636: Performed by: NURSE ANESTHETIST, CERTIFIED REGISTERED

## 2022-02-16 PROCEDURE — 250N000025 HC SEVOFLURANE, PER MIN: Performed by: SURGERY

## 2022-02-16 PROCEDURE — 82248 BILIRUBIN DIRECT: CPT | Performed by: NURSE PRACTITIONER

## 2022-02-16 PROCEDURE — 85027 COMPLETE CBC AUTOMATED: CPT | Performed by: INTERNAL MEDICINE

## 2022-02-16 PROCEDURE — 80053 COMPREHEN METABOLIC PANEL: CPT | Performed by: INTERNAL MEDICINE

## 2022-02-16 PROCEDURE — 999N000141 HC STATISTIC PRE-PROCEDURE NURSING ASSESSMENT: Performed by: SURGERY

## 2022-02-16 PROCEDURE — 96372 THER/PROPH/DIAG INJ SC/IM: CPT | Performed by: INTERNAL MEDICINE

## 2022-02-16 PROCEDURE — 88304 TISSUE EXAM BY PATHOLOGIST: CPT | Mod: TC | Performed by: SURGERY

## 2022-02-16 PROCEDURE — 250N000009 HC RX 250: Performed by: SURGERY

## 2022-02-16 PROCEDURE — 710N000009 HC RECOVERY PHASE 1, LEVEL 1, PER MIN: Performed by: SURGERY

## 2022-02-16 PROCEDURE — 0FT44ZZ RESECTION OF GALLBLADDER, PERCUTANEOUS ENDOSCOPIC APPROACH: ICD-10-PCS | Performed by: SURGERY

## 2022-02-16 PROCEDURE — 370N000017 HC ANESTHESIA TECHNICAL FEE, PER MIN: Performed by: SURGERY

## 2022-02-16 PROCEDURE — 250N000009 HC RX 250: Performed by: NURSE ANESTHETIST, CERTIFIED REGISTERED

## 2022-02-16 PROCEDURE — 360N000076 HC SURGERY LEVEL 3, PER MIN: Performed by: SURGERY

## 2022-02-16 PROCEDURE — C9113 INJ PANTOPRAZOLE SODIUM, VIA: HCPCS | Performed by: SURGERY

## 2022-02-16 PROCEDURE — 258N000003 HC RX IP 258 OP 636: Performed by: ANESTHESIOLOGY

## 2022-02-16 PROCEDURE — 99233 SBSQ HOSP IP/OBS HIGH 50: CPT | Performed by: FAMILY MEDICINE

## 2022-02-16 PROCEDURE — 250N000011 HC RX IP 250 OP 636: Performed by: NURSE ANESTHETIST, CERTIFIED REGISTERED

## 2022-02-16 PROCEDURE — 120N000001 HC R&B MED SURG/OB

## 2022-02-16 PROCEDURE — 999N000198 HC STATISTIC WOC PT EDUCATION, 16-30 MIN

## 2022-02-16 PROCEDURE — 47562 LAPAROSCOPIC CHOLECYSTECTOMY: CPT | Performed by: SURGERY

## 2022-02-16 PROCEDURE — 36415 COLL VENOUS BLD VENIPUNCTURE: CPT | Performed by: INTERNAL MEDICINE

## 2022-02-16 PROCEDURE — 250N000011 HC RX IP 250 OP 636: Performed by: INTERNAL MEDICINE

## 2022-02-16 PROCEDURE — C9113 INJ PANTOPRAZOLE SODIUM, VIA: HCPCS | Performed by: INTERNAL MEDICINE

## 2022-02-16 RX ORDER — POLYETHYLENE GLYCOL 3350 17 G/17G
17 POWDER, FOR SOLUTION ORAL DAILY
Status: DISCONTINUED | OUTPATIENT
Start: 2022-02-17 | End: 2022-02-21 | Stop reason: HOSPADM

## 2022-02-16 RX ORDER — ONDANSETRON 4 MG/1
4 TABLET, ORALLY DISINTEGRATING ORAL EVERY 6 HOURS PRN
Status: DISCONTINUED | OUTPATIENT
Start: 2022-02-16 | End: 2022-02-21 | Stop reason: HOSPADM

## 2022-02-16 RX ORDER — FAMOTIDINE 20 MG/1
20 TABLET, FILM COATED ORAL
Status: DISCONTINUED | OUTPATIENT
Start: 2022-02-18 | End: 2022-02-16

## 2022-02-16 RX ORDER — FENTANYL CITRATE 50 UG/ML
25 INJECTION, SOLUTION INTRAMUSCULAR; INTRAVENOUS EVERY 5 MIN PRN
Status: DISCONTINUED | OUTPATIENT
Start: 2022-02-16 | End: 2022-02-16 | Stop reason: HOSPADM

## 2022-02-16 RX ORDER — CEFAZOLIN SODIUM 2 G/100ML
2 INJECTION, SOLUTION INTRAVENOUS SEE ADMIN INSTRUCTIONS
Status: DISCONTINUED | OUTPATIENT
Start: 2022-02-16 | End: 2022-02-16 | Stop reason: HOSPADM

## 2022-02-16 RX ORDER — BISACODYL 10 MG
10 SUPPOSITORY, RECTAL RECTAL DAILY PRN
Status: DISCONTINUED | OUTPATIENT
Start: 2022-02-16 | End: 2022-02-21 | Stop reason: HOSPADM

## 2022-02-16 RX ORDER — ONDANSETRON 2 MG/ML
4 INJECTION INTRAMUSCULAR; INTRAVENOUS EVERY 30 MIN PRN
Status: DISCONTINUED | OUTPATIENT
Start: 2022-02-16 | End: 2022-02-16 | Stop reason: HOSPADM

## 2022-02-16 RX ORDER — AMOXICILLIN 250 MG
1 CAPSULE ORAL 2 TIMES DAILY
Status: DISCONTINUED | OUTPATIENT
Start: 2022-02-16 | End: 2022-02-16

## 2022-02-16 RX ORDER — NALOXONE HYDROCHLORIDE 0.4 MG/ML
0.2 INJECTION, SOLUTION INTRAMUSCULAR; INTRAVENOUS; SUBCUTANEOUS
Status: DISCONTINUED | OUTPATIENT
Start: 2022-02-16 | End: 2022-02-21 | Stop reason: HOSPADM

## 2022-02-16 RX ORDER — NALOXONE HYDROCHLORIDE 0.4 MG/ML
0.4 INJECTION, SOLUTION INTRAMUSCULAR; INTRAVENOUS; SUBCUTANEOUS
Status: DISCONTINUED | OUTPATIENT
Start: 2022-02-16 | End: 2022-02-21 | Stop reason: HOSPADM

## 2022-02-16 RX ORDER — ONDANSETRON 2 MG/ML
INJECTION INTRAMUSCULAR; INTRAVENOUS PRN
Status: DISCONTINUED | OUTPATIENT
Start: 2022-02-16 | End: 2022-02-16

## 2022-02-16 RX ORDER — PROCHLORPERAZINE MALEATE 5 MG
5 TABLET ORAL EVERY 6 HOURS PRN
Status: DISCONTINUED | OUTPATIENT
Start: 2022-02-16 | End: 2022-02-21 | Stop reason: HOSPADM

## 2022-02-16 RX ORDER — LIDOCAINE HYDROCHLORIDE AND EPINEPHRINE 10; 10 MG/ML; UG/ML
INJECTION, SOLUTION INFILTRATION; PERINEURAL PRN
Status: DISCONTINUED | OUTPATIENT
Start: 2022-02-16 | End: 2022-02-16 | Stop reason: HOSPADM

## 2022-02-16 RX ORDER — DIPHENHYDRAMINE HCL 12.5MG/5ML
12.5 LIQUID (ML) ORAL EVERY 6 HOURS PRN
Status: DISCONTINUED | OUTPATIENT
Start: 2022-02-16 | End: 2022-02-21 | Stop reason: HOSPADM

## 2022-02-16 RX ORDER — FAMOTIDINE 20 MG/1
20 TABLET, FILM COATED ORAL
Status: DISCONTINUED | OUTPATIENT
Start: 2022-02-16 | End: 2022-02-16

## 2022-02-16 RX ORDER — DEXTROSE MONOHYDRATE, SODIUM CHLORIDE, AND POTASSIUM CHLORIDE 50; 1.49; 4.5 G/1000ML; G/1000ML; G/1000ML
INJECTION, SOLUTION INTRAVENOUS CONTINUOUS
Status: DISCONTINUED | OUTPATIENT
Start: 2022-02-16 | End: 2022-02-17

## 2022-02-16 RX ORDER — ONDANSETRON 4 MG/1
4 TABLET, ORALLY DISINTEGRATING ORAL EVERY 30 MIN PRN
Status: DISCONTINUED | OUTPATIENT
Start: 2022-02-16 | End: 2022-02-16 | Stop reason: HOSPADM

## 2022-02-16 RX ORDER — PROPOFOL 10 MG/ML
INJECTION, EMULSION INTRAVENOUS PRN
Status: DISCONTINUED | OUTPATIENT
Start: 2022-02-16 | End: 2022-02-16

## 2022-02-16 RX ORDER — FAMOTIDINE 20 MG/1
20 TABLET, FILM COATED ORAL
Status: DISCONTINUED | OUTPATIENT
Start: 2022-02-16 | End: 2022-02-21 | Stop reason: HOSPADM

## 2022-02-16 RX ORDER — ACETAMINOPHEN 325 MG/1
975 TABLET ORAL EVERY 8 HOURS
Status: DISPENSED | OUTPATIENT
Start: 2022-02-16 | End: 2022-02-19

## 2022-02-16 RX ORDER — OXYCODONE HYDROCHLORIDE 5 MG/1
5 TABLET ORAL EVERY 4 HOURS PRN
Status: DISCONTINUED | OUTPATIENT
Start: 2022-02-16 | End: 2022-02-16 | Stop reason: HOSPADM

## 2022-02-16 RX ORDER — DEXAMETHASONE SODIUM PHOSPHATE 10 MG/ML
INJECTION, SOLUTION INTRAMUSCULAR; INTRAVENOUS PRN
Status: DISCONTINUED | OUTPATIENT
Start: 2022-02-16 | End: 2022-02-16

## 2022-02-16 RX ORDER — ONDANSETRON 2 MG/ML
4 INJECTION INTRAMUSCULAR; INTRAVENOUS EVERY 6 HOURS PRN
Status: DISCONTINUED | OUTPATIENT
Start: 2022-02-16 | End: 2022-02-21 | Stop reason: HOSPADM

## 2022-02-16 RX ORDER — MAGNESIUM HYDROXIDE 1200 MG/15ML
LIQUID ORAL PRN
Status: DISCONTINUED | OUTPATIENT
Start: 2022-02-16 | End: 2022-02-16 | Stop reason: HOSPADM

## 2022-02-16 RX ORDER — SODIUM CHLORIDE, SODIUM LACTATE, POTASSIUM CHLORIDE, CALCIUM CHLORIDE 600; 310; 30; 20 MG/100ML; MG/100ML; MG/100ML; MG/100ML
INJECTION, SOLUTION INTRAVENOUS CONTINUOUS
Status: DISCONTINUED | OUTPATIENT
Start: 2022-02-16 | End: 2022-02-16 | Stop reason: HOSPADM

## 2022-02-16 RX ORDER — CEFAZOLIN SODIUM 2 G/100ML
2 INJECTION, SOLUTION INTRAVENOUS
Status: DISCONTINUED | OUTPATIENT
Start: 2022-02-16 | End: 2022-02-16 | Stop reason: HOSPADM

## 2022-02-16 RX ORDER — HYDROMORPHONE HYDROCHLORIDE 1 MG/ML
0.2 INJECTION, SOLUTION INTRAMUSCULAR; INTRAVENOUS; SUBCUTANEOUS EVERY 5 MIN PRN
Status: DISCONTINUED | OUTPATIENT
Start: 2022-02-16 | End: 2022-02-16 | Stop reason: HOSPADM

## 2022-02-16 RX ORDER — LIDOCAINE 40 MG/G
CREAM TOPICAL
Status: DISCONTINUED | OUTPATIENT
Start: 2022-02-16 | End: 2022-02-16 | Stop reason: HOSPADM

## 2022-02-16 RX ORDER — ACETAMINOPHEN 325 MG/1
650 TABLET ORAL EVERY 4 HOURS PRN
Status: DISCONTINUED | OUTPATIENT
Start: 2022-02-19 | End: 2022-02-16

## 2022-02-16 RX ORDER — OXYCODONE HYDROCHLORIDE 5 MG/1
5 TABLET ORAL EVERY 4 HOURS PRN
Status: DISCONTINUED | OUTPATIENT
Start: 2022-02-16 | End: 2022-02-21 | Stop reason: HOSPADM

## 2022-02-16 RX ORDER — BUPIVACAINE HYDROCHLORIDE 2.5 MG/ML
INJECTION, SOLUTION INFILTRATION; PERINEURAL PRN
Status: DISCONTINUED | OUTPATIENT
Start: 2022-02-16 | End: 2022-02-16 | Stop reason: HOSPADM

## 2022-02-16 RX ORDER — FENTANYL CITRATE 50 UG/ML
INJECTION, SOLUTION INTRAMUSCULAR; INTRAVENOUS PRN
Status: DISCONTINUED | OUTPATIENT
Start: 2022-02-16 | End: 2022-02-16

## 2022-02-16 RX ORDER — DIPHENHYDRAMINE HYDROCHLORIDE 50 MG/ML
12.5 INJECTION INTRAMUSCULAR; INTRAVENOUS EVERY 6 HOURS PRN
Status: DISCONTINUED | OUTPATIENT
Start: 2022-02-16 | End: 2022-02-21 | Stop reason: HOSPADM

## 2022-02-16 RX ORDER — HYDROMORPHONE HCL IN WATER/PF 6 MG/30 ML
0.2 PATIENT CONTROLLED ANALGESIA SYRINGE INTRAVENOUS
Status: DISCONTINUED | OUTPATIENT
Start: 2022-02-16 | End: 2022-02-21

## 2022-02-16 RX ORDER — LIDOCAINE HYDROCHLORIDE 20 MG/ML
INJECTION, SOLUTION INFILTRATION; PERINEURAL PRN
Status: DISCONTINUED | OUTPATIENT
Start: 2022-02-16 | End: 2022-02-16

## 2022-02-16 RX ADMIN — SODIUM CHLORIDE, POTASSIUM CHLORIDE, SODIUM LACTATE AND CALCIUM CHLORIDE: 600; 310; 30; 20 INJECTION, SOLUTION INTRAVENOUS at 09:41

## 2022-02-16 RX ADMIN — PANTOPRAZOLE SODIUM 40 MG: 40 INJECTION, POWDER, FOR SOLUTION INTRAVENOUS at 08:04

## 2022-02-16 RX ADMIN — ROCURONIUM BROMIDE 50 MG: 50 INJECTION, SOLUTION INTRAVENOUS at 09:48

## 2022-02-16 RX ADMIN — PHENYLEPHRINE HYDROCHLORIDE 100 MCG: 10 INJECTION INTRAVENOUS at 10:28

## 2022-02-16 RX ADMIN — ONDANSETRON 4 MG: 2 INJECTION INTRAMUSCULAR; INTRAVENOUS at 10:48

## 2022-02-16 RX ADMIN — SUGAMMADEX 200 MG: 100 INJECTION, SOLUTION INTRAVENOUS at 10:56

## 2022-02-16 RX ADMIN — HEPARIN SODIUM 5000 UNITS: 5000 INJECTION, SOLUTION INTRAVENOUS; SUBCUTANEOUS at 08:04

## 2022-02-16 RX ADMIN — PHENYLEPHRINE HYDROCHLORIDE 50 MCG: 10 INJECTION INTRAVENOUS at 10:39

## 2022-02-16 RX ADMIN — PHENYLEPHRINE HYDROCHLORIDE 100 MCG: 10 INJECTION INTRAVENOUS at 10:11

## 2022-02-16 RX ADMIN — PROPOFOL 90 MG: 10 INJECTION, EMULSION INTRAVENOUS at 09:48

## 2022-02-16 RX ADMIN — ACETAMINOPHEN 975 MG: 325 TABLET ORAL at 20:27

## 2022-02-16 RX ADMIN — HYDROMORPHONE HYDROCHLORIDE 0.2 MG: 1 INJECTION, SOLUTION INTRAMUSCULAR; INTRAVENOUS; SUBCUTANEOUS at 08:03

## 2022-02-16 RX ADMIN — PIPERACILLIN AND TAZOBACTAM 3.38 G: 3; .375 INJECTION, POWDER, FOR SOLUTION INTRAVENOUS at 04:30

## 2022-02-16 RX ADMIN — AMLODIPINE BESYLATE 2.5 MG: 2.5 TABLET ORAL at 08:04

## 2022-02-16 RX ADMIN — FAMOTIDINE 20 MG: 20 TABLET, FILM COATED ORAL at 20:28

## 2022-02-16 RX ADMIN — FENTANYL CITRATE 100 MCG: 50 INJECTION, SOLUTION INTRAMUSCULAR; INTRAVENOUS at 09:45

## 2022-02-16 RX ADMIN — DEXAMETHASONE SODIUM PHOSPHATE 10 MG: 10 INJECTION, SOLUTION INTRAMUSCULAR; INTRAVENOUS at 09:48

## 2022-02-16 RX ADMIN — HYDROMORPHONE HYDROCHLORIDE 0.2 MG: 1 INJECTION, SOLUTION INTRAMUSCULAR; INTRAVENOUS; SUBCUTANEOUS at 04:20

## 2022-02-16 RX ADMIN — POTASSIUM CHLORIDE, DEXTROSE MONOHYDRATE AND SODIUM CHLORIDE: 150; 5; 450 INJECTION, SOLUTION INTRAVENOUS at 12:50

## 2022-02-16 RX ADMIN — HYDROMORPHONE HYDROCHLORIDE 0.2 MG: 0.2 INJECTION, SOLUTION INTRAMUSCULAR; INTRAVENOUS; SUBCUTANEOUS at 12:49

## 2022-02-16 RX ADMIN — HEPARIN SODIUM 5000 UNITS: 5000 INJECTION, SOLUTION INTRAVENOUS; SUBCUTANEOUS at 20:29

## 2022-02-16 RX ADMIN — LIDOCAINE HYDROCHLORIDE 60 MG: 20 INJECTION, SOLUTION INFILTRATION; PERINEURAL at 09:48

## 2022-02-16 RX ADMIN — POTASSIUM CHLORIDE, DEXTROSE MONOHYDRATE AND SODIUM CHLORIDE: 150; 5; 450 INJECTION, SOLUTION INTRAVENOUS at 22:35

## 2022-02-16 RX ADMIN — PIPERACILLIN AND TAZOBACTAM 3.38 G: 3; .375 INJECTION, POWDER, FOR SOLUTION INTRAVENOUS at 12:53

## 2022-02-16 RX ADMIN — LEVOTHYROXINE SODIUM 100 MCG: 0.1 TABLET ORAL at 08:04

## 2022-02-16 RX ADMIN — OXYCODONE HYDROCHLORIDE 5 MG: 5 TABLET ORAL at 20:54

## 2022-02-16 RX ADMIN — DOCUSATE SODIUM 50 MG AND SENNOSIDES 8.6 MG 1 TABLET: 8.6; 5 TABLET, FILM COATED ORAL at 20:28

## 2022-02-16 RX ADMIN — PHENYLEPHRINE HYDROCHLORIDE 0.3 MCG/KG/MIN: 10 INJECTION INTRAVENOUS at 10:33

## 2022-02-16 RX ADMIN — ROCURONIUM BROMIDE 20 MG: 50 INJECTION, SOLUTION INTRAVENOUS at 10:28

## 2022-02-16 ASSESSMENT — ACTIVITIES OF DAILY LIVING (ADL)
ADLS_ACUITY_SCORE: 10
ADLS_ACUITY_SCORE: 14
ADLS_ACUITY_SCORE: 14
ADLS_ACUITY_SCORE: 10
ADLS_ACUITY_SCORE: 14
ADLS_ACUITY_SCORE: 10
ADLS_ACUITY_SCORE: 14
ADLS_ACUITY_SCORE: 10

## 2022-02-16 ASSESSMENT — LIFESTYLE VARIABLES: TOBACCO_USE: 0

## 2022-02-16 NOTE — ANESTHESIA PREPROCEDURE EVALUATION
Anesthesia Pre-Procedure Evaluation    Patient: Lindsey Bermudez   MRN: [id number] : 1931        Preoperative Diagnosis: Acute cholecystitis [K81.0]    Procedure : Procedure(s):  CHOLECYSTECTOMY, LAPAROSCOPIC          Past Medical History:   Diagnosis Date     Anemia, unspecified     Created by Conversion      CKD (chronic kidney disease) stage 4, GFR 15-29 ml/min (H) 2015     Disease of thyroid gland     hypothyroidism     Diverticulitis of colon     Created by Conversion  Replacement Utility updated for latest IMO load     Eczema      Hydronephrosis      Hyperlipidemia      Hypertension      Hypothyroidism     Created by Conversion  Replacement Utility updated for latest IMO load     Osteoarthritis     Created by Conversion  Replacement Utility updated for latest IMO load     Skin cancer      Ureteral stone       Past Surgical History:   Procedure Laterality Date     ANKLE SURGERY      plate     BACK SURGERY       BREAST SURGERY      biopsies     CATARACT EXTRACTION       COMBINED CYSTOSCOPY, INSERT STENT URETER(S) N/A 2015    Procedure: CYSTOSCOPY, BLADDER BIOPSY AND FULGERATION.;  Surgeon: Linn Gilbert MD;  Location: [facility];  Service:      IR LUMBAR EPIDURAL STEROID INJECTION  2003     IR LUMBAR EPIDURAL STEROID INJECTION  2004     IR LUMBAR EPIDURAL STEROID INJECTION  2007     IR LUMBAR EPIDURAL STEROID INJECTION  2007     IR NEPHROLITHOTOMY  12/10/2015     IR NEPHROSTOMY TUBE PLACEMENT RIGHT  2015     NEPHROSTOMY W/ INTRODUCTION OF CATHETER Right       Allergies   Allergen Reactions     Adhesive Tape-Silicones [Adhesive Tape] Unknown     Blisters with some bandage like products      Social History     Tobacco Use     Smoking status: Never Smoker     Smokeless tobacco: Never Used   Substance Use Topics     Alcohol use: Yes     Comment: Alcoholic Drinks/day: rare      Wt Readings from Last 1 Encounters:   02/15/22 74.8 kg (164 lb 12.8 oz)         Anesthesia Evaluation   Pt has had prior anesthetic. Type: General.    No history of anesthetic complications       ROS/MED HX  ENT/Pulmonary:  - neg pulmonary ROS  (-) tobacco use   Neurologic:  - neg neurologic ROS     Cardiovascular:     (+) Dyslipidemia hypertension----- (-) murmur   METS/Exercise Tolerance:     Hematologic:       Musculoskeletal:   (+) arthritis,     GI/Hepatic:  - neg GI/hepatic ROS     Renal/Genitourinary:     (+) renal disease (CKD stage 4), type: CRI,     Endo:     (+) thyroid problem, hypothyroidism,     Psychiatric/Substance Use:  - neg psychiatric ROS     Infectious Disease:  - neg infectious disease ROS     Malignancy:  - neg malignancy ROS     Other:            Physical Exam    Airway        Mallampati: II   TM distance: > 3 FB   Neck ROM: full   Mouth opening: > 3 cm    Respiratory Devices and Support         Dental  no notable dental history         Cardiovascular          Rhythm and rate: regular and normal (-) no murmur    Pulmonary           breath sounds clear to auscultation           OUTSIDE LABS:  CBC:   Lab Results   Component Value Date    WBC 5.1 02/15/2022    WBC 4.5 10/27/2020    HGB 11.4 (L) 02/15/2022    HGB 11.6 (L) 10/27/2020    HCT 36.5 02/15/2022    HCT 35.7 10/27/2020     02/15/2022     10/27/2020     BMP:   Lab Results   Component Value Date     02/15/2022     10/27/2020    POTASSIUM 4.8 02/15/2022    POTASSIUM 5.1 (H) 10/27/2020    CHLORIDE 107 02/15/2022    CHLORIDE 108 (H) 10/27/2020    CO2 20 (L) 02/15/2022    CO2 25 10/27/2020    BUN 30 (H) 02/15/2022    BUN 28 10/27/2020    CR 1.61 (H) 02/15/2022    CR 1.82 (H) 10/27/2020     02/15/2022    GLC 97 10/27/2020     COAGS:   Lab Results   Component Value Date    INR 1.17 (H) 02/25/2020     POC: No results found for: BGM, HCG, HCGS  HEPATIC:   Lab Results   Component Value Date    ALBUMIN 3.7 02/15/2022    PROTTOTAL 6.8 02/15/2022    ALT 14 02/15/2022    AST 15 02/15/2022     ALKPHOS 163 (H) 02/15/2022    BILITOTAL 0.3 02/15/2022     OTHER:   Lab Results   Component Value Date    LACT 0.6 (L) 02/15/2022    CHAS 8.8 02/15/2022    PHOS 3.3 09/15/2020    MAG 2.3 10/01/2018    LIPASE 46 02/15/2022    TSH 3.01 09/21/2021    CRP 1.7 (H) 02/15/2022    SED 26 (H) 09/21/2021       Anesthesia Plan    ASA Status:  3      Anesthesia Type: General.     - Airway: ETT   Induction: Intravenous.           Consents    Anesthesia Plan(s) and associated risks, benefits, and realistic alternatives discussed. Questions answered and patient/representative(s) expressed understanding.     - Discussed: Risks, Benefits and Alternatives for BOTH SEDATION and the PROCEDURE were discussed     - Discussed with:  Patient         Postoperative Care    Pain management: IV analgesics, Oral pain medications.   PONV prophylaxis: Ondansetron (or other 5HT-3)     Comments:    Other Comments: 2/15/22: COVID NEGATIVE    GETA.  Decadron and zofran for PONV ppx.            Ruben Snyder MD

## 2022-02-16 NOTE — SIGNIFICANT EVENT
Assessment completed so pt can go down to surgery. Voided and had a mucous like stool. Very tearful. Dilaudid iv for pain. Difficult to turn side to side due to the pain. Alert and oriented.  Ring, earrings, necklace and a watch taken off and put with her belongings.

## 2022-02-16 NOTE — PROGRESS NOTES
[facility]    PROGRESS NOTE - Hospitalist Service    Assessment and Plan    89-year-old female with history of hypertension and CKD and recent fall injuring her right side admitted to the hospital with worsening right-sided abdominal pain found to have acute cholecystitis    Acute cholecystitis  -Post op lap michelle today  ---lft normal  - Abdominal ultrasound showed cholelithiasis with mild gallbladder wall calcificatio was slightly distended common bile duct   - CT abdomen showed cholelithiasis with no bowel obstruction and large stool burden  - has been on zosyn, no infection so will stop now  ---on iVF  ---surgery ordered famotidine  --- Monitor for fevers off of antibiotics.  --- Pain uncontrolled except with IV pain meds     Right side abdominal pain  - Not sure is related to cholelithiasis versus recent fall 1 week ago  - negative x-ray pelvic and right hip  ---cxr negative rib fracture  ---PT and OT consulted  - Continue Tylenol as needed  - Low-dose Dilaudid for breakthrough pain.  We will need to assess postoperatively to ensure no other sources of pain due to a recent fall     Chronic kidney disease stage IV  -Creatinine is about baseline  -Gentle IV hydration overnight     Accelerated hypertension  - improved overnight  ---Elevated blood pressure on ER presentation, probably secondary to pain  - Restart home Norvasc  - Add hydralazine IV as needed     Constipation  - CT abdomen shows large stool burden  - continue miralax and senna     Hypothyroidism  - continue Synthroid     Hyperlipidemia  - Hold statin for now     --- Patient unable to wean off of oxygen.  Need to monitor volume status with age.  Also requiring IV pain medications as well as concern for assessing pain postoperatively to be sure controlled as possible other causes for pain due to recent fall.    COVID STATUS negative 2/15  VTE prophylaxis:  Heparin SQ  DIET: Orders Placed This Encounter      NPO for  Medical/Clinical Reasons Except for: Meds, Ice Chips    Disposition; unclear, PT posted  Barriers to discharge: ; surgical progression  Code Status: No CPR- Do NOT Intubate        Subjective:  Patient seen and chart reviewed.  Seen rtwice - once with brother in law in room and he is worried about resources for [place] as she lives along and no family around nearby    She is able to tell me about falling on her way to mailbox and how she was sure that she broke a rib and threfore didn't seek medical attention until pain was more severe.    Seen after surgery - pain ok.    Denies nausea or vomiting    PHYSICAL EXAM  B/P: 132/62, T: 97.8, P: 88, R: 20     Intake/Output Summary (Last 24 hours) at 2/16/2022 0814  Last data filed at 2/16/2022 0600  Gross per 24 hour   Intake 562.5 ml   Output 400 ml   Net 162.5 ml      Vitals:    02/15/22 0752 02/15/22 2034   Weight: 76.7 kg (169 lb) 74.8 kg (164 lb 12.8 oz)       General Appearance: pleasant, NAD  HEENT: oropharynx moist  Respiratory: CTA-B  Cardiovascular: RRR, no murmers  GI: soft, no significant tenderness with inciosion c/d/i  Skin: no open areas  Musculoskeletal: no wignificant edema  Neurologic: alert and oriented and grossly intact      PERTINENT LABS/IMAGING:    Recent Results (from the past 24 hour(s))   Asymptomatic COVID-19 Virus (Coronavirus) by PCR Nasopharyngeal    Collection Time: 02/15/22  1:59 PM    Specimen: Nasopharyngeal; Swab   Result Value Ref Range    SARS CoV2 PCR Negative Negative        XR Pelvis and Hip Right 2 Views   Final Result   IMPRESSION: No acute fracture or dislocation. 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 Spurring and small chronic heterotopic bone fragments adjacent to both greater trochanters. Severe degenerative changes in the    lumbar spine. Vascular calcifications.      Chest XR,  PA & LAT   Final Result   IMPRESSION: Lungs are clear. No pleural effusion of significant size. Upper normal  heart size. Tortuous aorta. Bony demineralization. Advanced degenerative changes shoulders. No significant change since prior.            Abdomen US, limited (RUQ only)   Final Result   IMPRESSION:   1.  Cholelithiasis as seen on CT. There is also mild gallbladder wall calcification. There is a sonographic Arredondo's sign but no other findings are visible on ultrasound given the numerous stones/wall calcifications. Would suggest a hepatobiliary scan to    evaluate for acute cholecystitis.   2.  Mild intrahepatic biliary dilatation with a slightly distended common bile duct to a centimeter. No intraductal stones visualized. Findings are non significantly changed since the CT from 2017. No intraductal stones or pancreatic lesion on prior CTs.   3.  Atrophied right kidney with 2 small simple cysts.         NOTE: ABNORMAL REPORT      THE DICTATION ABOVE DESCRIBES AN ABNORMALITY FOR WHICH FOLLOW-UP IS NEEDED.       CT Abdomen Pelvis w/o Contrast   Final Result   IMPRESSION:    1.  There is a quite redundant colon with large stool burden.   2.  No evidence of bowel obstruction or appendicitis.   3.  Bladder is mildly distended and has a large amount of air within it. Query recent catheterization? No findings to indicate a colovesical fistula.   4.  There are punctate bilateral nonobstructing intrarenal calculi with an atrophic right kidney, unchanged.   5.  Cholelithiasis.   6.  Other noncritical findings as noted above.              Yasmin Montilla MD  [facility] Medicine Service  [phone]

## 2022-02-16 NOTE — PLAN OF CARE
Goal Outcome Evaluation:    Plan of Care Reviewed With: patient          Outcome Evaluation: Pneumatic Stockings on in OR    Arrived back from surgery this afternoon. She came up painful but is resting now. Bp elevated. Will follow. Incontinent of urine. Declined clears at this time.

## 2022-02-16 NOTE — UTILIZATION REVIEW
Admission Status; Secondary Review Determination   Under the authority of the Utilization Management Committee, the utilization review process indicated a secondary review on Lindsey Bermudez. The review outcome is based on review of the medical records, discussions with staff, and applying clinical experience noted on the date of the review.   (x) Inpatient Status Appropriate - This patient's medical care is consistent with medical management for inpatient care and reasonable inpatient medical practice.     RATIONALE FOR DETERMINATION   89 yr female with hx HTN, CKD with recent fall and right sided pain presented with abdominal pain. Suspected cholecystitis with low grade fever but not fully clear of etiology of pain.  Now S/P lap michelle but has had low grade temps, unable to wean from oxygen post procedure at this time.  BP spiking again after procedure.  Has not yet tolerated oral pain meds or diet.  Frail elderly female with complexity of pain with recent fall and suspected acute cholecystitis.  Needs ongoing cares for oxygen supplementation, transition to oral pain meds and monitor for further abx need.  Has CKDIV and ongoing IVF.  Discussed with Dr. Montilla.  Very frail and ongoing hypoxia issues complicating discharge.  Crossing second night.    At the time of admission with the information available to the attending physician more than 2 nights Hospital complex care was anticipated, based on patient risk of adverse outcome if treated as outpatient and complex care required. Inpatient admission is appropriate based on the Medicare guidelines.   The information on this document is developed by the utilization review team in order for the business office to ensure compliance. This only denotes the appropriateness of proper admission status and does not reflect the quality of care rendered.   The definitions of Inpatient Status and Observation Status used in making the determination above are those provided in the  CMS Coverage Manual, Chapter 1 and Chapter 6, section 70.4.   Sincerely,   Laura Anguiano MD  Utilization Review  Physician Advisor  [facility]

## 2022-02-16 NOTE — ANESTHESIA PROCEDURE NOTES
Airway       Patient location during procedure: OR       Procedure Start/Stop Times: 2/16/2022 9:51 AM  Staff -        CRNA: Rema Lane APRN CRNA       Performed By: CRNA  Consent for Airway        Urgency: elective  Indications and Patient Condition       Indications for airway management: soheila-procedural       Induction type:intravenous       Mask difficulty assessment: 1 - vent by mask    Final Airway Details       Final airway type: endotracheal airway       Successful airway: ETT - single  Endotracheal Airway Details        ETT size (mm): 7.0       Cuffed: yes       Successful intubation technique: direct laryngoscopy       DL Blade Type: Cabral 2       Grade View of Cords: 1       Adjucts: stylet and tooth guard       Position: Right       Measured from: lips       Secured at (cm): 20    Post intubation assessment        Placement verified by: capnometry, equal breath sounds and chest rise        Number of attempts at approach: 1       Number of other approaches attempted: 0       Secured with: silk tape       Ease of procedure: easy       Dentition: Intact and Unchanged

## 2022-02-16 NOTE — OP NOTE
Operative Note:  Laparoscopic Cholecystectomy    Name:  Lindsey Bermduez  PCP:  No Ref-Primary, Physician  Procedure Date:  2/15/2022 - 2/16/2022      Procedure(s):  CHOLECYSTECTOMY, LAPAROSCOPIC     Pre-Procedure Diagnosis:  Cholecystitis    Post-Procedure Diagnosis:    Chronic Cholecystits    Surgeon(s):  Moreno Townsend MD      Anesthesia Type:  GET      Findings:  Evidence for acute and chronic cholecystitis the fundus of the gallbladder.his sclerose down into the gallbladder fossa.    Operative Report:    The patient was taken to Operating Room, identified, and the procedure verified as Laparoscopic Cholecystectomy  A Time Out was held.    General endotracheal anesthesia was administered and tolerated well. After the induction, the abdomen was prepped in the usual sterile fashion. The patient was positioned in the supine position. They were sterilely prepped and draped in the usual surgical fashion.    Local anesthetic agent was injected into the skin near the umbilicus and an incision made. A periumbilical incision was made and a 5mm trocar was placed under direct visualization using the optiview technique, and a pneumoperitoneum was brought up to 15 mm Hg. . 2 5's and a  11 mm port was placed under direct vision.  All skin incisions were infiltrated with a local anesthetic agent before making the incision and placing the trocars.     A lot of adhesions noted in the right upper quadrant these were taken down with use of hook electrocautery and blunt dissection to help clear the right upper quadrant and to expose the gallbladder.  The gallbladder was identified, the fundus grasped and retracted cephalad up over the inferior edge of the liver. The infundibulum was grasped and retracted laterally, exposing the neck of the gallbladder.  The visceral peritoneum overlying the angle of Calot was dissected through with hook cautery. The cystic duct was clearly identified and bluntly dissected  circumferentially. The junctions of the gallbladder and the cystic duct was clearly identified.  The cystic duct was clipped with one clip on the gallbladder side and 3 clips more proximally on the cystic duct. The Cystic duct was divided sharply with laparoscopic scissors between the clips. The cystic artery was identified, it was dissected free of surrounding structures.  The cystic artery was clipped and divided.      The gallbladder was dissected from the liver bed in retrograde fashion with the electrocautery. The gallbladder was removed by placing it in a endocatch bag and extracting it from through the 11 mm trocar site.   The fascia of the 10 mm trocar site was then closed with 0 vicryl using an Endo fascial closure device under direct physician laparoscope     Pneumoperitoneum was reduced.  The trocars were removed.  The skin was then closed with 4-0 monocryl and a sterile dressing was applied.    Instrument, sponge, and needle counts were correct at closure and at the conclusion of the case.    Estimated Blood Loss:   5 cc    Specimens:    Gall Bladder       Drains:   None    Complications:    None    Moreno Townsend MD     Date: 2/16/2022  Time: 11:32 AM

## 2022-02-16 NOTE — PROGRESS NOTES
Wound Ostomy  WOC Assessment       Allergies:  Adhesive Tape-Silicones [Adhesive Tape]    Diagnosis:   Patient Active Problem List    Diagnosis Date Noted     Calculus of gallbladder without cholecystitis without obstruction 02/15/2022     Priority: Medium     CKD (chronic kidney disease) stage 4, GFR 15-29 ml/min (H) 09/22/2021     Priority: Medium     Polyarthralgia 09/22/2021     Priority: Medium     Urinary incontinence, unspecified type 09/22/2021     Priority: Medium     Vitamin B12 deficiency (non anemic) 02/27/2020     Priority: Medium     Essential hypertension      Priority: Medium     Created by Conversion  Replacement Utility updated for latest IMO load      Formatting of this note might be different from the original.  Created by Conversion    Replacement Utility updated for latest IMO load       Primary hypothyroidism      Priority: Medium     Created by Conversion  Replacement Utility updated for latest IMO load      Formatting of this note might be different from the original.  Created by Conversion    Replacement Utility updated for latest IMO load       Anemia in chronic kidney disease 10/31/2019     Priority: Medium     Secondary hyperparathyroidism of renal origin (H) 10/31/2019     Priority: Medium     Osteoarthrosis      Priority: Medium     Created by Conversion  Replacement Utility updated for latest IMO load      Formatting of this note might be different from the original.  Created by Conversion    Replacement Utility updated for latest IMO load       Diverticulitis of colon      Priority: Medium     Created by Conversion  Replacement Utility updated for latest IMO load      Formatting of this note might be different from the original.  Created by Conversion    Replacement Utility updated for latest IMO load       Hypercholesterolemia      Priority: Medium     Created by Conversion      Formatting of this note might be different from the original.  Created by Conversion       Renal stone  11/09/2015     Priority: Medium     Labs:  Recent Labs   Lab Test 02/16/22  0901 02/15/22  0751   CRP  --  1.7*   HGB 10.2* 11.4*   ALBUMIN 3.1* 3.7       Gentry:  Gentry Score: 16    Specialty Bed:   Standard Med Surg mattress    Wound culture obtained: No    Edema:  Not assessed today    Attempted to see patient today; however, she is currently in OR for lap michelle.  Chart reviewed and orders entered. Will attempt first in person WOC assessment on 2/17/22.  Anatomic Site/Laterality:   R buttock    Reason for ongoing care:   Wound assessment and plan of care    Encounter Type:  Initial Wound Type:   R buttock documented as present on admission. Patient has had a fall prior to admission.     Plan of care: Place Mepilex dressing for prevention and also to cover wound on R buttock.         Nursing care provided was - chart review only.    Discussed plan of care with NA.    Outcomes and treatment recommendations are to promote skin integrity, contain exudate and promote wound healing.    Actions taken by WOC RN: order entered.    Planned Follow Up: Thursday.    Plan for next visit: First WOC wound assessment.

## 2022-02-16 NOTE — ANESTHESIA CARE TRANSFER NOTE
Patient: Lindsey Bermudez    Procedure: Procedure(s):  CHOLECYSTECTOMY, LAPAROSCOPIC       Diagnosis: Acute cholecystitis [K81.0]  Diagnosis Additional Information: No value filed.    Anesthesia Type:   General     Note:    Oropharynx: oropharynx clear of all foreign objects and spontaneously breathing  Level of Consciousness: drowsy  Oxygen Supplementation: face mask  Level of Supplemental Oxygen (L/min / FiO2): 6  Independent Airway: airway patency satisfactory and stable  Dentition: dentition unchanged  Vital Signs Stable: post-procedure vital signs reviewed and stable  Report to RN Given: handoff report given  Patient transferred to: PACU    Handoff Report: Identifed the Patient, Identified the Reponsible Provider, Reviewed the pertinent medical history, Discussed the surgical course, Reviewed Intra-OP anesthesia mangement and issues during anesthesia, Set expectations for post-procedure period and Allowed opportunity for questions and acknowledgement of understanding      Vitals:  Vitals Value Taken Time   /60 2/16/22 1106   Temp 100.3    Pulse 81    Resp 12    SpO2 99%        Electronically Signed By: SANTANA Randall CRNA  February 16, 2022  11:08 AM

## 2022-02-16 NOTE — CONSULTS
GENERAL SURGERY CONSULTATION:    I have evaluated Lindsey Bermudez and I agree with the consult note of Mercedez Wayne CNP, with the addition of;    HPI:  Persistent abdominal pain.    PEx:  No acute distress some tenderness to palpation particular on the right side of her abdomen.    Radiology Studies:  I have personally reviewed these studies and I believe they show, and ultrasonic Arredondo sign with gallstones    ASSESSMENT/PLAN:  89-year-old patient that appears to have evidence of a cholecystitis.    Recommend a laparoscopic cholecystectomy.      Moreno [facility]  [phone]

## 2022-02-16 NOTE — ANESTHESIA POSTPROCEDURE EVALUATION
Patient: Lindsey Bermudez    Procedure: Procedure(s):  CHOLECYSTECTOMY, LAPAROSCOPIC       Diagnosis:Acute cholecystitis [K81.0]  Diagnosis Additional Information: No value filed.    Anesthesia Type:  General    Note:  Disposition: Outpatient   Postop Pain Control: Uneventful            Sign Out: Well controlled pain   PONV: No   Neuro/Psych: Uneventful            Sign Out: Acceptable/Baseline neuro status   Airway/Respiratory: Uneventful            Sign Out: Acceptable/Baseline resp. status   CV/Hemodynamics: Uneventful            Sign Out: Acceptable CV status; No obvious hypovolemia; No obvious fluid overload   Other NRE: NONE   DID A NON-ROUTINE EVENT OCCUR? No           Last vitals:  Vitals Value Taken Time   /74 02/16/22 1200   Temp 37.9  C (100.2  F) 02/16/22 1105   Pulse 77 02/16/22 1211   Resp 11 02/16/22 1207   SpO2 93 % 02/16/22 1211   Vitals shown include unvalidated device data.    Electronically Signed By: Ruben Snyder MD  February 16, 2022  12:47 PM

## 2022-02-16 NOTE — PLAN OF CARE
Problem: Adult Inpatient Plan of Care  Goal: Absence of Hospital-Acquired Illness or Injury  Intervention: Prevent Skin Injury    Problem: Pain Acute  Goal: Acceptable Pain Control and Functional Ability  2/16/2022 0525 by Fariba Wolfe, RN  Outcome: Declining     Problem: Fever  Goal: Body Temperature in Desired Range  2/16/2022 0525 by Fariba Wolfe, RN  Outcome: Declining      Admitted for abdominal pain. Found to have several gall stones. Developed a low grade temp overnight. Complaining of severe sharp intermittent stabbing pain in her RUQ. Multiple doses of dilaudid given for pain control. Pt also has the start of a pressure injury on her buttocks. There is a small area that looks like a skin tear and her entire buttocks is very red. Mepilex applied to area and WOC nurse consult ordered. Plan for cholecystectomy in AM.

## 2022-02-16 NOTE — ED NOTES
"[facility] ED Handoff Report    ED Chief Complaint: RLQ pain    ED Diagnosis:  (K80.20) Calculus of gallbladder without cholecystitis without obstruction  Comment:   Plan:        PMH:    Past Medical History:   Diagnosis Date     Anemia, unspecified     Created by Conversion      CKD (chronic kidney disease) stage 4, GFR 15-29 ml/min (H) 12/11/2015     Disease of thyroid gland     hypothyroidism     Diverticulitis of colon     Created by Conversion  Replacement Utility updated for latest IMO load     Eczema      Hydronephrosis      Hyperlipidemia      Hypertension      Hypothyroidism     Created by Conversion  Replacement Utility updated for latest IMO load     Osteoarthritis     Created by Conversion  Replacement Utility updated for latest IMO load     Skin cancer      Ureteral stone         Code Status:  No CPR- Do NOT Intubate     Falls Risk: Yes Band: Applied    Current Living Situation/Residence: lives in a house     Elimination Status: Continent: Yes and wears briefs     Activity Level: SBA w/ walker    Patients Preferred Language:  English     Needed: No    Vital Signs:  BP (!) 160/81   Pulse 80   Temp 97.8  F (36.6  C) (Oral)   Resp 17   Ht 1.651 m (5' 5\")   Wt 76.7 kg (169 lb)   SpO2 95%   BMI 28.12 kg/m       Cardiac Rhythm: NSR    Pain Score: 7/10    Is the Patient Confused:  No    Last Food or Drink: 02/15/22 Clear liquids until midnight    Focused Assessment:  Patient c/o RLQ pain since yesterday, worse with positional changes. Prefers to be in sitting position, recommended offloading.    Tests Performed: Done: Labs and Imaging    Treatments Provided:  See MAR    Family Dynamics/Concerns: No    Family Updated On Visitor Policy: Yes    Plan of Care Communicated to Family: Yes    Who Was Updated about Plan of Care: Ward Cox Checklist Done and Signed by Patient: Yes    Medications sent with patient: yes    Covid: asymptomatic , negative    Additional Information: " Patient on clear liquids until midnight, then NPO.    RN: Juliane Gomez   2/15/2022 7:38 PM

## 2022-02-16 NOTE — CONSULTS
Care Management Initial Consult    General Information  Assessment completed with: Patient, pt  Type of CM/SW Visit: Initial Assessment    Primary Care Provider verified and updated as needed: Yes   Readmission within the last 30 days: no previous admission in last 30 days   Return Category: Progression of disease  Reason for Consult: discharge planning  Advance Care Planning: Advance Care Planning Reviewed: no concerns identified          Communication Assessment  Patient's communication style: spoken language (English or Bilingual)    Hearing Difficulty or Deaf: no   Wear Glasses or Blind: no    Cognitive  Cognitive/Neuro/Behavioral: WDL                      Living Environment:   People in home: alone     Current living Arrangements: condominium      Able to return to prior arrangements: yes       Family/Social Support:  Care provided by: self  Provides care for: no one  Marital Status: Single  Other (specify) (brother in law Ward)          Description of Support System: Supportive    Support Assessment: Adequate family and caregiver support,Adequate social supports    Current Resources:   Patient receiving home care services: No     Community Resources: DME  Equipment currently used at home: walker, rolling  Supplies currently used at home: None    Employment/Financial:  Employment Status:          Financial Concerns: No concerns identified   Referral to Financial Counselor: No       Lifestyle & Psychosocial Needs:  Social Determinants of Health     Tobacco Use: Low Risk      Smoking Tobacco Use: Never Smoker     Smokeless Tobacco Use: Never Used   Alcohol Use: Not on file   Financial Resource Strain: Not on file   Food Insecurity: Not on file   Transportation Needs: Not on file   Physical Activity: Not on file   Stress: Not on file   Social Connections: Not on file   Intimate Partner Violence: Not on file   Depression: Not at risk     PHQ-2 Score: 0   Housing Stability: Not on file       Functional  Status:  Prior to admission patient needed assistance:   Dependent ADLs:: Independent  Dependent IADLs:: Independent  Assesssment of Functional Status: Not at baseline with ADL Functioning    Mental Health Status:  Mental Health Status: Past Concern       Chemical Dependency Status:                Values/Beliefs:  Spiritual, Cultural Beliefs, Evangelical Practices, Values that affect care:                 Additional Information:  RYAN assessed, lives alone, no svcs and use walker. Brother in law, Ward may be able to transport, has cleaning svcs only.      Simran Lomax RN

## 2022-02-17 ENCOUNTER — APPOINTMENT (OUTPATIENT)
Dept: OCCUPATIONAL THERAPY | Facility: HOSPITAL | Age: 87
DRG: 418 | End: 2022-02-17
Attending: INTERNAL MEDICINE
Payer: MEDICARE

## 2022-02-17 ENCOUNTER — APPOINTMENT (OUTPATIENT)
Dept: PHYSICAL THERAPY | Facility: HOSPITAL | Age: 87
DRG: 418 | End: 2022-02-17
Attending: INTERNAL MEDICINE
Payer: MEDICARE

## 2022-02-17 LAB
ALBUMIN SERPL-MCNC: 2.8 G/DL (ref 3.5–5)
ALBUMIN UR-MCNC: 10 MG/DL
ALP SERPL-CCNC: 135 U/L (ref 45–120)
ALT SERPL W P-5'-P-CCNC: 71 U/L (ref 0–45)
ANION GAP SERPL CALCULATED.3IONS-SCNC: 8 MMOL/L (ref 5–18)
APPEARANCE UR: ABNORMAL
AST SERPL W P-5'-P-CCNC: 66 U/L (ref 0–40)
BACTERIA #/AREA URNS HPF: ABNORMAL /HPF
BASOPHILS # BLD AUTO: 0 10E3/UL (ref 0–0.2)
BASOPHILS NFR BLD AUTO: 0 %
BILIRUB SERPL-MCNC: 0.5 MG/DL (ref 0–1)
BILIRUB UR QL STRIP: NEGATIVE
BUN SERPL-MCNC: 33 MG/DL (ref 8–28)
CALCIUM SERPL-MCNC: 7.8 MG/DL (ref 8.5–10.5)
CHLORIDE BLD-SCNC: 106 MMOL/L (ref 98–107)
CO2 SERPL-SCNC: 22 MMOL/L (ref 22–31)
COLOR UR AUTO: ABNORMAL
CREAT SERPL-MCNC: 1.99 MG/DL (ref 0.6–1.1)
EOSINOPHIL # BLD AUTO: 0 10E3/UL (ref 0–0.7)
EOSINOPHIL NFR BLD AUTO: 0 %
ERYTHROCYTE [DISTWIDTH] IN BLOOD BY AUTOMATED COUNT: 12.6 % (ref 10–15)
GFR SERPL CREATININE-BSD FRML MDRD: 23 ML/MIN/1.73M2
GLUCOSE BLD-MCNC: 139 MG/DL (ref 70–125)
GLUCOSE BLDC GLUCOMTR-MCNC: 120 MG/DL (ref 70–99)
GLUCOSE UR STRIP-MCNC: NEGATIVE MG/DL
HCT VFR BLD AUTO: 29.3 % (ref 35–47)
HGB BLD-MCNC: 9.4 G/DL (ref 11.7–15.7)
HGB UR QL STRIP: ABNORMAL
IMM GRANULOCYTES # BLD: 0.1 10E3/UL
IMM GRANULOCYTES NFR BLD: 1 %
KETONES UR STRIP-MCNC: NEGATIVE MG/DL
LEUKOCYTE ESTERASE UR QL STRIP: ABNORMAL
LYMPHOCYTES # BLD AUTO: 0.6 10E3/UL (ref 0.8–5.3)
LYMPHOCYTES NFR BLD AUTO: 6 %
MCH RBC QN AUTO: 30.3 PG (ref 26.5–33)
MCHC RBC AUTO-ENTMCNC: 32.1 G/DL (ref 31.5–36.5)
MCV RBC AUTO: 95 FL (ref 78–100)
MONOCYTES # BLD AUTO: 1.2 10E3/UL (ref 0–1.3)
MONOCYTES NFR BLD AUTO: 14 %
MUCOUS THREADS #/AREA URNS LPF: PRESENT /LPF
NEUTROPHILS # BLD AUTO: 6.9 10E3/UL (ref 1.6–8.3)
NEUTROPHILS NFR BLD AUTO: 79 %
NITRATE UR QL: NEGATIVE
NRBC # BLD AUTO: 0 10E3/UL
NRBC BLD AUTO-RTO: 0 /100
PATH REPORT.COMMENTS IMP SPEC: NORMAL
PATH REPORT.COMMENTS IMP SPEC: NORMAL
PATH REPORT.FINAL DX SPEC: NORMAL
PATH REPORT.GROSS SPEC: NORMAL
PATH REPORT.MICROSCOPIC SPEC OTHER STN: NORMAL
PATH REPORT.RELEVANT HX SPEC: NORMAL
PH UR STRIP: 5.5 [PH] (ref 5–7)
PHOTO IMAGE: NORMAL
PLAT MORPH BLD: ABNORMAL
PLATELET # BLD AUTO: ABNORMAL 10*3/UL
POTASSIUM BLD-SCNC: 4.8 MMOL/L (ref 3.5–5)
PROT SERPL-MCNC: 5.9 G/DL (ref 6–8)
RBC # BLD AUTO: 3.1 10E6/UL (ref 3.8–5.2)
RBC MORPH BLD: ABNORMAL
RBC URINE: 13 /HPF
SODIUM SERPL-SCNC: 136 MMOL/L (ref 136–145)
SP GR UR STRIP: 1.02 (ref 1–1.03)
SQUAMOUS EPITHELIAL: 1 /HPF
UROBILINOGEN UR STRIP-MCNC: <2 MG/DL
WBC # BLD AUTO: 8.6 10E3/UL (ref 4–11)
WBC CLUMPS #/AREA URNS HPF: PRESENT /HPF
WBC URINE: 105 /HPF

## 2022-02-17 PROCEDURE — 258N000003 HC RX IP 258 OP 636: Performed by: FAMILY MEDICINE

## 2022-02-17 PROCEDURE — 250N000011 HC RX IP 250 OP 636: Performed by: SURGERY

## 2022-02-17 PROCEDURE — 97110 THERAPEUTIC EXERCISES: CPT | Mod: GP

## 2022-02-17 PROCEDURE — 88304 TISSUE EXAM BY PATHOLOGIST: CPT | Mod: 26 | Performed by: PATHOLOGY

## 2022-02-17 PROCEDURE — 85014 HEMATOCRIT: CPT | Performed by: SURGERY

## 2022-02-17 PROCEDURE — 97530 THERAPEUTIC ACTIVITIES: CPT | Mod: GP

## 2022-02-17 PROCEDURE — 250N000013 HC RX MED GY IP 250 OP 250 PS 637: Performed by: SURGERY

## 2022-02-17 PROCEDURE — 250N000013 HC RX MED GY IP 250 OP 250 PS 637: Performed by: FAMILY MEDICINE

## 2022-02-17 PROCEDURE — 97162 PT EVAL MOD COMPLEX 30 MIN: CPT | Mod: GP

## 2022-02-17 PROCEDURE — 85041 AUTOMATED RBC COUNT: CPT | Performed by: SURGERY

## 2022-02-17 PROCEDURE — 120N000001 HC R&B MED SURG/OB

## 2022-02-17 PROCEDURE — C9113 INJ PANTOPRAZOLE SODIUM, VIA: HCPCS | Performed by: SURGERY

## 2022-02-17 PROCEDURE — 97166 OT EVAL MOD COMPLEX 45 MIN: CPT | Mod: GO

## 2022-02-17 PROCEDURE — G0463 HOSPITAL OUTPT CLINIC VISIT: HCPCS

## 2022-02-17 PROCEDURE — 80053 COMPREHEN METABOLIC PANEL: CPT | Performed by: SURGERY

## 2022-02-17 PROCEDURE — 81001 URINALYSIS AUTO W/SCOPE: CPT | Performed by: FAMILY MEDICINE

## 2022-02-17 PROCEDURE — 87086 URINE CULTURE/COLONY COUNT: CPT | Performed by: FAMILY MEDICINE

## 2022-02-17 PROCEDURE — 36415 COLL VENOUS BLD VENIPUNCTURE: CPT | Performed by: SURGERY

## 2022-02-17 PROCEDURE — 99232 SBSQ HOSP IP/OBS MODERATE 35: CPT | Performed by: FAMILY MEDICINE

## 2022-02-17 PROCEDURE — 97535 SELF CARE MNGMENT TRAINING: CPT | Mod: GO

## 2022-02-17 RX ADMIN — AMLODIPINE BESYLATE 2.5 MG: 2.5 TABLET ORAL at 09:00

## 2022-02-17 RX ADMIN — DOCUSATE SODIUM 50 MG AND SENNOSIDES 8.6 MG 1 TABLET: 8.6; 5 TABLET, FILM COATED ORAL at 20:58

## 2022-02-17 RX ADMIN — POTASSIUM CHLORIDE, DEXTROSE MONOHYDRATE AND SODIUM CHLORIDE: 150; 5; 450 INJECTION, SOLUTION INTRAVENOUS at 09:01

## 2022-02-17 RX ADMIN — LEVOTHYROXINE SODIUM 100 MCG: 0.1 TABLET ORAL at 06:28

## 2022-02-17 RX ADMIN — OXYCODONE HYDROCHLORIDE 5 MG: 5 TABLET ORAL at 08:59

## 2022-02-17 RX ADMIN — HEPARIN SODIUM 5000 UNITS: 5000 INJECTION, SOLUTION INTRAVENOUS; SUBCUTANEOUS at 20:58

## 2022-02-17 RX ADMIN — HYDROMORPHONE HYDROCHLORIDE 0.2 MG: 0.2 INJECTION, SOLUTION INTRAMUSCULAR; INTRAVENOUS; SUBCUTANEOUS at 16:15

## 2022-02-17 RX ADMIN — PANTOPRAZOLE SODIUM 40 MG: 40 INJECTION, POWDER, FOR SOLUTION INTRAVENOUS at 09:00

## 2022-02-17 RX ADMIN — DOCUSATE SODIUM 50 MG AND SENNOSIDES 8.6 MG 2 TABLET: 8.6; 5 TABLET, FILM COATED ORAL at 08:59

## 2022-02-17 RX ADMIN — BISACODYL 10 MG: 10 SUPPOSITORY RECTAL at 13:24

## 2022-02-17 RX ADMIN — ACETAMINOPHEN 975 MG: 325 TABLET ORAL at 20:58

## 2022-02-17 RX ADMIN — FOLIC ACID 1 MG: 1 TABLET ORAL at 09:00

## 2022-02-17 RX ADMIN — ACETAMINOPHEN 975 MG: 325 TABLET ORAL at 12:48

## 2022-02-17 RX ADMIN — OXYCODONE HYDROCHLORIDE 2.5 MG: 5 TABLET ORAL at 16:55

## 2022-02-17 RX ADMIN — POLYETHYLENE GLYCOL 3350 17 G: 17 POWDER, FOR SOLUTION ORAL at 09:00

## 2022-02-17 RX ADMIN — CYANOCOBALAMIN TAB 1000 MCG 1000 MCG: 1000 TAB at 08:59

## 2022-02-17 RX ADMIN — HEPARIN SODIUM 5000 UNITS: 5000 INJECTION, SOLUTION INTRAVENOUS; SUBCUTANEOUS at 09:00

## 2022-02-17 RX ADMIN — ACETAMINOPHEN 975 MG: 325 TABLET ORAL at 04:50

## 2022-02-17 ASSESSMENT — ACTIVITIES OF DAILY LIVING (ADL)
PREVIOUS_RESPONSIBILITIES: MEAL PREP;LAUNDRY
ADLS_ACUITY_SCORE: 14

## 2022-02-17 NOTE — PROGRESS NOTES
02/17/22 0845   Quick Adds   Type of Visit Initial PT Evaluation   Living Environment   People in Home alone   Current Living Arrangements house   Home Accessibility no concerns   Transportation Anticipated family or friend will provide   Living Environment Comments Lives in a twin home. no stairs at all. Walk in shower with shower chair, no grab bars.    Self-Care   Usual Activity Tolerance good   Current Activity Tolerance fair   Equipment Currently Used at Home cane, straight;walker, rolling  (4WW)   Fall history within last six months yes   Number of times patient has fallen within last six months 1  (Slip on ice)   Activity/Exercise/Self-Care Comment Uses 4WW at home and in community due to recent fall. Drives, gets assist with cleaning weekly.    General Information   Onset of Illness/Injury or Date of Surgery 02/15/22   Referring Physician Cary   Patient/Family Therapy Goals Statement (PT) none stated   Pertinent History of Current Problem (include personal factors and/or comorbidities that impact the POC) per H&P: past medical history of diverticulitis, HLD, HTN, CKD4, who presents to the ED  for evaluation of abdominal pain.  She was admitted with cholecystitis   Existing Precautions/Restrictions fall;abdominal   Weight-Bearing Status - LLE full weight-bearing   Weight-Bearing Status - RLE full weight-bearing   Cognition   Orientation Status (Cognition) oriented x 4   Range of Motion (ROM)   Range of Motion ROM deficits secondary to pain  (BLE)   Strength (Manual Muscle Testing)   Strength (Manual Muscle Testing) Deficits observed during functional mobility  (BLE)   Bed Mobility   Bed Mobility supine-sit   Supine-Sit [place] (Bed Mobility) minimum assist (75% patient effort)   Transfers   Transfers sit-stand transfer;bed-chair transfer   Bed-Chair Transfer   Bed-Chair [place] (Transfers) minimum assist (75% patient effort);verbal cues   Sit-Stand Transfer   Sit-Stand [place]  (Transfers) contact guard   Assistive Device (Sit-Stand Transfers) cankayce miller   Clinical Impression   Criteria for Skilled Therapeutic Intervention Yes, treatment indicated   PT Diagnosis (PT) impaired functional mobility   Influenced by the following impairments pain, decreased strength, decreased balance, decreased endurance   Functional limitations due to impairments bed mobility, transfers, gait   Clinical Presentation (PT Evaluation Complexity) Stable/Uncomplicated   Clinical Presentation Rationale pt presents as medically diagnosed   Clinical Decision Making (Complexity) low complexity   Planned Therapy Interventions (PT) bed mobility training;gait training;patient/family education;strengthening;transfer training   Anticipated Equipment Needs at Discharge (PT) walker, rolling  (4WW - has at home. )   Risk & Benefits of therapy have been explained evaluation/treatment results reviewed;patient   PT Discharge Planning   PT Discharge Recommendation (DC Rec) Transitional Care Facility;home with home care physical therapy;home with assist   PT Rationale for DC Rec Due to pain, TCU recommended as patient lives alone and currently presents with limited mobility requiring assist during bed mobility and chair transfer.   PT Brief overview of current status Pt very tearful, but motivated to improve functional mobility.   Plan of Care Review   Plan of Care Reviewed With patient   Total Evaluation Time   Total Evaluation Time (Minutes) 10   Physical Therapy Goals   PT Frequency Daily   PT Predicated Duration/Target Date for Goal Attainment 02/22/22   PT Goals Bed Mobility;Transfers;Gait   PT: Bed Mobility Modified independent;Supine to/from sit   PT: Transfers Modified independent;Sit to/from stand;Bed to/from chair;Assistive device   PT: Gait Modified independent;Rolling walker;100 feet

## 2022-02-17 NOTE — PLAN OF CARE
Goal Outcome Evaluation:    Plan of Care Reviewed With: patient     Tolerating clears. 4 lap sites C/D/I. Incontinent of large amount of urine. Very painful with just turning in bed, given Oxy PRN and now resting. Using IS to 1000, SCD's in place. VSS, will continue to monitor.

## 2022-02-17 NOTE — PROGRESS NOTES
General Surgery Progress Note    POST OP DAY  # 1 status post a Lap [place]    Subjective:   Pt is feeling pretty well.  She hurts with movement.  She is passing gas and has a good appetite without nausea,    Vitals:    02/16/22 2031 02/16/22 2321 02/17/22 0402 02/17/22 0801   BP:  94/54 117/57 122/59   BP Location:  Left arm Left arm Left arm   Patient Position:       Pulse:  66 66 63   Resp:  18 16 16   Temp:  98  F (36.7  C) 98.4  F (36.9  C) 98.4  F (36.9  C)   TempSrc:  Temporal Temporal Oral   SpO2: 96% 97% 98% 96%   Weight:       Height:           Physical Exam:  Lungs:  CTA  CV:       RRR  Ab:       Soft, + BS,     Recent Labs   Lab 02/17/22  0808 02/16/22  0901   WBC 8.6 6.1   HGB 9.4* 10.2*   HCT 29.3* 32.6*   PLT  --  227       Recent Labs   Lab 02/16/22  0901      CO2 17*   BUN 30*   ALBUMIN 3.1*   ALKPHOS 155*   ALT 22   AST 29       Assessment:  Pt is progressing well.  OK for discharge from surgical point of view.  She is living independently which may be a bit difficult for the time being.    Plan: Continue with [facility] Rehab to help prepare for discharge.      Moreno Townsend MD  [facility]  [phone]

## 2022-02-17 NOTE — CONSULTS
"WO Nurse Inpatient Wound Assessment   Reason for consultation: Evaluate and treat preexisting left buttock wounds    Assessment  Left buttock wounds due to Pressure Injury, Friction and Shear  Status: initial assessment  Scar tissue with build up of callous   Treatment Plan  Left buttock wounds: Every 3 days   Cleanse wound with saline  Apply triad paste to wound bed  Cover with sacral foam    PIP ACTIVITY MEASURES:  If pt is refusing to turn or reposition they must be educated on the  potential injury from not off loading pressure.  Then this \"educated refusal\" needs to be documented as an \"educated refusal to turn/ reposition\" and document if alert, etc. Additionally, if repeated refusals ensure the charge nurse, nurse manager and the provider is aware.  Follow Gentry Risk recommendations      CHAIR: Pt should sit on a chair cushion ([id number]) when up to the chair and not sit for more than one hour at a time before fully offloading backside (either stand for a couple of minutes and/or return to bed, positioning on a side) to relieve pressure and re-perfuse tissue.  Additionally, encourage pt to shift side to side every 15 minutes, too.    NOTE: the Z-Flow Pad is NOT a cushion, pt should NOT sit on the Z-Flow pads      BED:  Reposition side to side every 1-2 hours when awake.     No direct supine positioning,     position only side to sideKeep heels elevated    As able keep HOB below 30 degrees    Evaluate for specialty mattress         Orders Written  Recommended provider order: None, at this time  WO Nurse follow-up plan:weekly  Nursing to notify the Provider(s) and re-consult the WO Nurse if wound(s) deteriorates or new skin concern.    Patient History  According to provider note(s):  Lindsey Bermudez is a 89 year old female with a past medical history of diverticulitis, HLD, HTN, CKD4, who presents to this ED via EMS for evaluation of abdominal pain.  Basically patient reports an onset of intermittent, sharp " RLQ abdominal pain last night. She was able to fall asleep but then awoke this morning with pain at a 10/10 severity. Pain radiates to her back. Patient denies a history of abdominal surgeries. No nausea, vomiting, or diarrhea.  Of note, patient had a fall one week ago when she was going outside to get the mail. Patient was using her cane and slipped on the ice, falling onto her buttocks. Denies any head trauma. Paramedics were called and helped patient up. Patient was able to ambulate at baseline and after evaluation and no apparent pain they did not recommend further evaluation. She denies any issues since the fall until developing pain last night.   Patient reports chronically having urinary frequency but denies any new urinary frequency, dysuria, or urgency.  She also has constipation and last bowel movement a few days ago    Objective Data  Containment of urine/stool: Incontinence Protocol and LTG [place]    Active Diet Order  Orders Placed This Encounter      Advance Diet as Tolerated: Regular Diet Adult      Output:   I/O last 3 completed shifts:  In: 1263 [P.O.:360; I.V.:903]  Out: 210 [Urine:200; Blood:10]    Risk Assessment:   Sensory Perception: 3-->slightly limited  Moisture: 3-->occasionally moist  Activity: 2-->chairfast  Mobility: 2-->very limited  Nutrition: 3-->adequate  Friction and Shear: 2-->potential problem  Gentry Score: 15                          Labs: Recent Labs   Lab 02/17/22  0943 02/17/22  0808 02/16/22  0901 02/15/22  0751   ALBUMIN 2.8*  --    < > 3.7   HGB  --  9.4*   < > 11.4*   WBC  --  8.6   < > 5.1   CRP  --   --   --  1.7*    < > = values in this interval not displayed.       Physical Exam  Areas of skin assessed: focused left buttock     Wound Location:  Left buttock      Date of last photo 2/17/2022  Wound History: had a fall before admisssion however this is a recurring ulcer from a fall 1 year ago and was down x 1.5 days  Wound Base: 100 % hyperkeratotic tissue     Palpation of  the wound bed: rough      Drainage: none     Description of drainage: none     Measurements (length x width x depth, in cm) 2  x 3  x  0.1cm    Periwound skin: dry/scaly and peeling       Color: pink      Temperature: normal   Odor: none  Pain: denies , none    Interventions  Visual inspection and assessment completed yes   Wound Care Rationale Protect periwound skin, Promote moist wound healing without tissue dehydration , Provide selective debridement (autolysis) of nonviable tissue  and Provide protection   Wound Care: completed by RN  Supplies: ordered: triad paste  Current off-loading measures: Pillows under calves and Pillows  Current support surface: Standard  Atmos Air mattress  Education provided to: plan of care and Moisture management  Discussed plan of care with Patient    Evon Baxter RN BS CWOCN

## 2022-02-17 NOTE — PROGRESS NOTES
[facility]    PROGRESS NOTE - Hospitalist Service    Assessment and Plan    89-year-old female with history of hypertension and CKD and recent fall injuring her right side admitted to the hospital with worsening right-sided abdominal pain found to have acute cholecystitis    Acute cholecystitis  -Post op day #1 s/p lap michelle  - Abdominal ultrasound showed cholelithiasis with mild gallbladder wall calcificatio was slightly distended common bile duct   - CT abdomen showed cholelithiasis with no bowel obstruction and large stool burden  - stable lft  ---stop  iVF,   ---surgery ordered famotidine  ---no fevers off of antibiotics.(was on zosyn)  --- work on pain control  ---attempt to wean oxygen today    ABL anemia  ---mild, stable, recheck in am     Right side abdominal pain  - suspect cholelithiasis versus fall 1 week ago  - negative x-ray pelvic and right hip  ---cxr negative rib fracture  ---PT and OT consulted  - Continue Tylenol as needed  - Low-dose Dilaudid for breakthrough pain.  We will need to assess postoperatively to ensure no other sources of pain due to a recent fall     Chronic kidney disease stage IV  -Creatinine is about baseline  -Gentle IV hydration overnight     Hypertension  - improved since admit  ---Elevated blood pressure on ER presentation, probably secondary to pain  - continue home Norvasc  - Add hydralazine IV as needed     Constipation  - CT abdomen shows large stool burden  - continue miralax and senna     Hypothyroidism  - continue Synthroid     Hyperlipidemia  - Hold statin for now       COVID STATUS negative 2/15  VTE prophylaxis:  Heparin SQ  DIET: Orders Placed This Encounter      Advance Diet as Tolerated: Regular Diet Adult    Disposition; unclear, home care versus TCU  Barriers to discharge: ; wean off oxygen and surgical progression  Code Status: Full Code        Subjective:  Patient seen and chart reviewed.   Tearful about going home but doesn't want to  see her house   POFIONA (Jet Hernandez) wants TCU  Pain ok  Ate breakfast ok  No vomiting    PHYSICAL EXAM  B/P: 132/62, T: 97.8, P: 88, R: 20     Intake/Output Summary (Last 24 hours) at 2/16/2022 0814  Last data filed at 2/16/2022 0600  Gross per 24 hour   Intake 562.5 ml   Output 400 ml   Net 162.5 ml      Vitals:    02/15/22 0752 02/15/22 2034   Weight: 76.7 kg (169 lb) 74.8 kg (164 lb 12.8 oz)       General Appearance: pleasant, NAD  Respiratory: CTA-B  Cardiovascular: RRR, no murmers  GI: soft, no significant tenderness with incision c/d/i, slignt tenderness on lower right side but not significant   Skin: no open areas  Musculoskeletal: no significant edema  Neurologic: alert and oriented and grossly intact      PERTINENT LABS/IMAGING:    Recent Results (from the past 24 hour(s))   CBC with platelets    Collection Time: 02/16/22  9:01 AM   Result Value Ref Range    WBC Count 6.1 4.0 - 11.0 10e3/uL    RBC Count 3.38 (L) 3.80 - 5.20 10e6/uL    Hemoglobin 10.2 (L) 11.7 - 15.7 g/dL    Hematocrit 32.6 (L) 35.0 - 47.0 %    MCV 96 78 - 100 fL    MCH 30.2 26.5 - 33.0 pg    MCHC 31.3 (L) 31.5 - 36.5 g/dL    RDW 12.6 10.0 - 15.0 %    Platelet Count 227 150 - 450 10e3/uL   Comprehensive metabolic panel    Collection Time: 02/16/22  9:01 AM   Result Value Ref Range    Sodium 142 136 - 145 mmol/L    Potassium 4.4 3.5 - 5.0 mmol/L    Chloride 110 (H) 98 - 107 mmol/L    Carbon Dioxide (CO2) 17 (L) 22 - 31 mmol/L    Anion Gap 15 5 - 18 mmol/L    Urea Nitrogen 30 (H) 8 - 28 mg/dL    Creatinine 1.93 (H) 0.60 - 1.10 mg/dL    Calcium 8.2 (L) 8.5 - 10.5 mg/dL    Glucose 66 (L) 70 - 125 mg/dL    Alkaline Phosphatase 155 (H) 45 - 120 U/L    AST 29 0 - 40 U/L    ALT 22 0 - 45 U/L    Protein Total 6.2 6.0 - 8.0 g/dL    Albumin 3.1 (L) 3.5 - 5.0 g/dL    Bilirubin Total 0.6 0.0 - 1.0 mg/dL    GFR Estimate 24 (L) >60 mL/min/1.73m2   Bilirubin direct    Collection Time: 02/16/22  9:01 AM   Result Value Ref Range    Bilirubin Direct 0.2 <=0.5 mg/dL         XR Pelvis and Hip Right 2 Views   Final Result   IMPRESSION: No acute fracture or dislocation. 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 Spurring and small chronic heterotopic bone fragments adjacent to both greater trochanters. Severe degenerative changes in the    lumbar spine. Vascular calcifications.      Chest XR,  PA & LAT   Final Result   IMPRESSION: Lungs are clear. No pleural effusion of significant size. Upper normal heart size. Tortuous aorta. Bony demineralization. Advanced degenerative changes shoulders. No significant change since prior.            Abdomen US, limited (RUQ only)   Final Result   IMPRESSION:   1.  Cholelithiasis as seen on CT. There is also mild gallbladder wall calcification. There is a sonographic Arredondo's sign but no other findings are visible on ultrasound given the numerous stones/wall calcifications. Would suggest a hepatobiliary scan to    evaluate for acute cholecystitis.   2.  Mild intrahepatic biliary dilatation with a slightly distended common bile duct to a centimeter. No intraductal stones visualized. Findings are non significantly changed since the CT from 2017. No intraductal stones or pancreatic lesion on prior CTs.   3.  Atrophied right kidney with 2 small simple cysts.         NOTE: ABNORMAL REPORT      THE DICTATION ABOVE DESCRIBES AN ABNORMALITY FOR WHICH FOLLOW-UP IS NEEDED.       CT Abdomen Pelvis w/o Contrast   Final Result   IMPRESSION:    1.  There is a quite redundant colon with large stool burden.   2.  No evidence of bowel obstruction or appendicitis.   3.  Bladder is mildly distended and has a large amount of air within it. Query recent catheterization? No findings to indicate a colovesical fistula.   4.  There are punctate bilateral nonobstructing intrarenal calculi with an atrophic right kidney, unchanged.   5.  Cholelithiasis.   6.  Other noncritical findings as noted above.              Yasmin Montilla MD  [facility]  [facility]  [phone]

## 2022-02-17 NOTE — PROGRESS NOTES
CM met with pt to discuss TCU rec's pt is agreeable to TCU. Pt stated she would like [facility], [facility] and [facility]. Pt asked if she could discuss with her brother in law and provide more options. CM placed 3 TCU referrals and will follow up with pt again tomorrow.     CHARLIE Donnelly  2/17/2022  3:47 PM

## 2022-02-17 NOTE — PLAN OF CARE
Goal Outcome Evaluation:    Plan of Care Reviewed With: patient          Outcome Evaluation: Pneumatic Stockings on in OR    Problem: Adult Inpatient Plan of Care  Goal: Optimal Comfort and Wellbeing  Outcome: Ongoing, Progressing     Problem: Pain Acute  Goal: Acceptable Pain Control and Functional Ability  Outcome: Ongoing, Progressing  Intervention: Prevent or Manage Pain  Recent Flowsheet Documentation  Taken 2/17/2022 0030 by Tameka Sibley, RN  Medication Review/Management: medications reviewed       Pain to surgical abd overnight. Scheduled tylenol and ice packs to area effective. Purewick in place for urinary incontinence. Tolerating IV fluids. VSS. Slept intermittently. BS hypo. No flatus.

## 2022-02-17 NOTE — PLAN OF CARE
Goal Outcome Evaluation:    Pt is alert and oriented x4. Up to chair assist of one. Ambulated in the halls with assist of one and a walker. Usually uses a cane but needed more support.  Tolerating some regular food. Poor appetite.   Told to listen to her body and not push the food. Drinking well. Voiding is incontinent. Did collect UA per orders. Pain controlled with po oxycodone x1 and scheduled tylenol. Passing flatus and has hypoactive bowel sounds.

## 2022-02-18 ENCOUNTER — APPOINTMENT (OUTPATIENT)
Dept: PHYSICAL THERAPY | Facility: HOSPITAL | Age: 87
DRG: 418 | End: 2022-02-18
Payer: MEDICARE

## 2022-02-18 LAB
BACTERIA UR CULT: NORMAL
HGB BLD-MCNC: 9.7 G/DL (ref 11.7–15.7)
PLATELET # BLD AUTO: 189 10E3/UL (ref 150–450)

## 2022-02-18 PROCEDURE — 85049 AUTOMATED PLATELET COUNT: CPT | Performed by: SURGERY

## 2022-02-18 PROCEDURE — 99233 SBSQ HOSP IP/OBS HIGH 50: CPT | Performed by: INTERNAL MEDICINE

## 2022-02-18 PROCEDURE — 36415 COLL VENOUS BLD VENIPUNCTURE: CPT | Performed by: FAMILY MEDICINE

## 2022-02-18 PROCEDURE — 97530 THERAPEUTIC ACTIVITIES: CPT | Mod: GP

## 2022-02-18 PROCEDURE — 120N000001 HC R&B MED SURG/OB

## 2022-02-18 PROCEDURE — 85018 HEMOGLOBIN: CPT | Performed by: FAMILY MEDICINE

## 2022-02-18 PROCEDURE — 250N000013 HC RX MED GY IP 250 OP 250 PS 637: Performed by: SURGERY

## 2022-02-18 PROCEDURE — 97116 GAIT TRAINING THERAPY: CPT | Mod: GP

## 2022-02-18 PROCEDURE — C9113 INJ PANTOPRAZOLE SODIUM, VIA: HCPCS | Performed by: SURGERY

## 2022-02-18 PROCEDURE — 99024 POSTOP FOLLOW-UP VISIT: CPT | Performed by: PHYSICIAN ASSISTANT

## 2022-02-18 PROCEDURE — 250N000011 HC RX IP 250 OP 636: Performed by: SURGERY

## 2022-02-18 PROCEDURE — 250N000013 HC RX MED GY IP 250 OP 250 PS 637: Performed by: FAMILY MEDICINE

## 2022-02-18 RX ORDER — OXYCODONE HYDROCHLORIDE 5 MG/1
2.5 TABLET ORAL EVERY 6 HOURS PRN
Qty: 8 TABLET | Refills: 0 | Status: SHIPPED | OUTPATIENT
Start: 2022-02-18 | End: 2022-02-21

## 2022-02-18 RX ORDER — PANTOPRAZOLE SODIUM 40 MG/1
40 TABLET, DELAYED RELEASE ORAL
Status: DISCONTINUED | OUTPATIENT
Start: 2022-02-19 | End: 2022-02-21 | Stop reason: HOSPADM

## 2022-02-18 RX ORDER — AMOXICILLIN 250 MG
1 CAPSULE ORAL 2 TIMES DAILY
Start: 2022-02-18 | End: 2022-02-21

## 2022-02-18 RX ORDER — ACETAMINOPHEN 325 MG/1
650 TABLET ORAL EVERY 6 HOURS PRN
COMMUNITY
Start: 2022-02-18

## 2022-02-18 RX ADMIN — ACETAMINOPHEN 975 MG: 325 TABLET ORAL at 20:04

## 2022-02-18 RX ADMIN — AMLODIPINE BESYLATE 2.5 MG: 2.5 TABLET ORAL at 09:53

## 2022-02-18 RX ADMIN — CYANOCOBALAMIN TAB 1000 MCG 1000 MCG: 1000 TAB at 09:51

## 2022-02-18 RX ADMIN — ACETAMINOPHEN 975 MG: 325 TABLET ORAL at 05:33

## 2022-02-18 RX ADMIN — OXYCODONE HYDROCHLORIDE 2.5 MG: 5 TABLET ORAL at 14:40

## 2022-02-18 RX ADMIN — ACETAMINOPHEN 975 MG: 325 TABLET ORAL at 14:39

## 2022-02-18 RX ADMIN — POLYETHYLENE GLYCOL 3350 17 G: 17 POWDER, FOR SOLUTION ORAL at 09:50

## 2022-02-18 RX ADMIN — DOCUSATE SODIUM 50 MG AND SENNOSIDES 8.6 MG 1 TABLET: 8.6; 5 TABLET, FILM COATED ORAL at 09:51

## 2022-02-18 RX ADMIN — PANTOPRAZOLE SODIUM 40 MG: 40 INJECTION, POWDER, FOR SOLUTION INTRAVENOUS at 09:52

## 2022-02-18 RX ADMIN — OXYCODONE HYDROCHLORIDE 5 MG: 5 TABLET ORAL at 20:05

## 2022-02-18 RX ADMIN — OXYCODONE HYDROCHLORIDE 5 MG: 5 TABLET ORAL at 05:40

## 2022-02-18 RX ADMIN — DOCUSATE SODIUM 50 MG AND SENNOSIDES 8.6 MG 2 TABLET: 8.6; 5 TABLET, FILM COATED ORAL at 20:05

## 2022-02-18 RX ADMIN — LEVOTHYROXINE SODIUM 100 MCG: 0.1 TABLET ORAL at 06:39

## 2022-02-18 RX ADMIN — HEPARIN SODIUM 5000 UNITS: 5000 INJECTION, SOLUTION INTRAVENOUS; SUBCUTANEOUS at 09:52

## 2022-02-18 RX ADMIN — FAMOTIDINE 20 MG: 20 TABLET, FILM COATED ORAL at 20:04

## 2022-02-18 RX ADMIN — HEPARIN SODIUM 5000 UNITS: 5000 INJECTION, SOLUTION INTRAVENOUS; SUBCUTANEOUS at 20:03

## 2022-02-18 RX ADMIN — BISACODYL 10 MG: 10 SUPPOSITORY RECTAL at 09:51

## 2022-02-18 RX ADMIN — FOLIC ACID 1 MG: 1 TABLET ORAL at 09:51

## 2022-02-18 ASSESSMENT — ACTIVITIES OF DAILY LIVING (ADL)
ADLS_ACUITY_SCORE: 15
ADLS_ACUITY_SCORE: 14
ADLS_ACUITY_SCORE: 15

## 2022-02-18 NOTE — PROGRESS NOTES
"CM met with pt and brother in law, Ward. Pt has been at [facility] in the past and would like to discharge there. Referral sent yesterday. GABRIELA explained expanding TCU choices and Ward stated \"Anywhere close to here.\" Ward lives in [place] and has been visiting pt daily. Will need [facility] transport upon discharge.     CHARLIE Donnelly  2/18/2022  1:19 PM    "

## 2022-02-18 NOTE — PROGRESS NOTES
ASSESSMENT:  1. Acute cholecystitis    2. Calculus of gallbladder without cholecystitis without obstruction        Lindsey Bermudez is a 89 year old female who is s/p lap michelle on 2/16    PLAN:  -ADAT  -Ambulate and up in chair  -Pain control  -Ok to discharge from surgical standpoint, looking like she is going to need TCU, so likely won't be yet today. Surgery orders and recs placed, oxycodone script signed and on chart.    Geoffrey Ramirez PA-C  Pager - [phone]  Phone - [phone]   General Surgery    SUBJECTIVE:   She is doing ok, painful but tolerable, no n/v, tolerating diet, has been passing gas and BM yesterday    Patient Vitals for the past 24 hrs:   BP Temp Temp src Pulse Resp SpO2   02/18/22 0816 136/67 97.9  F (36.6  C) Oral 62 19 96 %   02/18/22 0021 122/63 98.4  F (36.9  C) Oral 66 18 96 %   02/17/22 1906 127/63 98.1  F (36.7  C) Oral 64 16 94 %   02/17/22 1539 127/60 97.7  F (36.5  C) Oral 61 18 96 %   02/17/22 1154 116/55 97.9  F (36.6  C) Oral 60 18 97 %        PHYSICAL EXAM:  GEN: No acute distress, comfortable  CV:RRR  ABD:soft, mild distention, expected ttp, dressings c/d/i  EXT:No cyanosis, edema or obvious abnormalities    02/17 0700 - 02/18 0659  In: 1220 [P.O.:720; I.V.:500]  Out: 1425 [Urine:1425]    Lab Results   Component Value Date    WBC 8.6 02/17/2022    HGB 9.7 02/18/2022    HCT 29.3 02/17/2022    MCV 95 02/17/2022     02/18/2022     INR/Prothrombin Time  Recent Labs   Lab 02/17/22  0943      CO2 22   BUN 33*     Lab Results   Component Value Date    ALT 71 (H) 02/17/2022    AST 66 (H) 02/17/2022    ALKPHOS 135 (H) 02/17/2022

## 2022-02-18 NOTE — PLAN OF CARE
"  Problem: Adult Inpatient Plan of Care  Goal: Plan of Care Review  Outcome: Ongoing, Progressing  Flowsheets  Taken 2/18/2022 1122 by Tiffani Sousa, RN  Outcome Evaluation: pt able to move in bed  Overall Patient Progress: improving  Taken 2/17/2022 0845 by Raissa Arnett  Plan of Care Reviewed With: patient     Goal Outcome Evaluation:    Plan of Care Reviewed With: patient     Overall Patient Progress: improving    Outcome Evaluation: pt able to move in bed      Pt with with assist of 1 and walker and gait belt.   Pt had large hard/formed stool this shift    Problem: Adult Inpatient Plan of Care  Goal: Absence of Hospital-Acquired Illness or Injury  Outcome: Ongoing, Progressing  Intervention: Identify and Manage Fall Risk  Recent Flowsheet Documentation  Taken 2/18/2022 1000 by Tiffani Sousa, RN  Safety Promotion/Fall Prevention:   bed alarm on   fall prevention program maintained   clutter free environment maintained   nonskid shoes/slippers when out of bed  Intervention: Prevent and Manage VTE (Venous Thromboembolism) Risk  Recent Flowsheet Documentation  Taken 2/18/2022 1000 by Tiffani Sousa, RN  Activity Management: activity adjusted per tolerance     Problem: Pain Acute  Goal: Acceptable Pain Control and Functional Ability  Outcome: Ongoing, Progressing     Pt reports back pain and abdominal pain    Problem: Fever  Goal: Body Temperature in Desired Range  Outcome: Ongoing, Progressing    /67 (BP Location: Left arm)   Pulse 62   Temp 97.9  F (36.6  C) (Oral)   Resp 19   Ht 1.651 m (5' 5\")   Wt 74.8 kg (164 lb 12.8 oz)   SpO2 96%   BMI 27.42 kg/m          "

## 2022-02-18 NOTE — PROGRESS NOTES
Hospitalist Progress Note  ADMIT DATE: 2/15/2022     FACILITY: [facility]    PCP: No Ref-Primary, Physician, None    Assessment/Plan    Lindsey Bermudez is a 89 year old female with history of hypertension and CKD and recent fall injuring her right side admitted to the hospital with worsening right-sided abdominal pain found to have acute cholecystitis     Acute cholecystitis  -Post op day #2 s/p lap michelle  - Abdominal ultrasound showed cholelithiasis with mild gallbladder wall calcificatio was slightly distended common bile duct   - CT abdomen showed cholelithiasis with no bowel obstruction and large stool burden  - stable lft  ---stop  iVF,   ---surgery ordered famotidine  ---no fevers off of antibiotics.(was on zosyn)  --- work on pain control     ABL anemia  ---mild, stable     Right side abdominal pain  - suspect cholelithiasis versus fall 1 week ago  - negative x-ray pelvic and right hip  ---cxr negative rib fracture  ---PT and OT consulted  - Continue Tylenol as needed  - Low-dose Dilaudid for breakthrough pain.      Chronic kidney disease stage IV  -Creatinine is about baseline     Hypertension  - improved since admit  ---Elevated blood pressure on ER presentation, probably secondary to pain  - continue home Norvasc  - hydralazine IV as needed     Constipation  - CT abdomen shows large stool burden  - continue miralax and senna     Hypothyroidism  - continue Synthroid     Hyperlipidemia  - Hold statin for now      COVID STATUS negative 2/15  VTE prophylaxis:  Heparin SQ  DIET: Orders Placed This Encounter      Advance Diet as Tolerated: Regular Diet Adult     Disposition; unclear, home care versus TCU  Barriers to discharge: ; postop constipation, surgical progression  Code Status: Full Code     Subjective  C/o abd pain, constipation; denies f/c, no n/v, no cp/sob    Objective    Vital signs in last 24 hours  Temp:  [97.7  F (36.5  C)-98.4  F (36.9  C)] 98.4  F (36.9  C)  Pulse:   [60-66] 66  Resp:  [16-18] 18  BP: (116-127)/(55-63) 122/63  SpO2:  [94 %-97 %] 96 % [unfilled] O2 Device: None (Room air)    Weight:   [unfilled] Weight change:     Intake/Output last 3 shifts  I/O last 3 completed shifts:  In: 1220 [P.O.:720; I.V.:500]  Out: 1425 [Urine:1425]  Body mass index is 27.42 kg/m .    Physical Exam    Physical Exam  General appearance: not in acute distress; obese  HEENT: PERRL, EOMI  Lungs: Clear breath sounds in bilateral lung fields  Cardiovascular: Regular rate and rhythm, normal S1-S2  Abdomen: postop lap michelle, distended, bowel sound slow  Musculoskeletal: No joint swelling  Skin: No rash and no edema  Neurology: AAO ×3.  Cranial nerves II - XII normal.  General weakness      Pertinent Labs   Lab Results: none today      Medications  Scheduled Meds:    acetaminophen  975 mg Oral Q8H     amLODIPine  2.5 mg Oral Daily     bisacodyl  10 mg Rectal Daily     cyanocobalamin  1,000 mcg Oral Daily     famotidine  20 mg Oral Q48H    Or     famotidine  20 mg Intravenous Q48H     folic acid  1 mg Oral Daily     heparin ANTICOAGULANT  5,000 Units Subcutaneous Q12H     levothyroxine  100 mcg Oral QAM AC     pantoprazole (PROTONIX) IV  40 mg Intravenous Daily with breakfast     polyethylene glycol  17 g Oral Daily     senna-docusate  1 tablet Oral BID    Or     senna-docusate  2 tablet Oral BID     sodium chloride (PF)  3 mL Intracatheter Q8H     Continuous Infusions:    - MEDICATION INSTRUCTIONS -       PRN Meds:.acetaminophen **OR** acetaminophen, bisacodyl, diphenhydrAMINE **OR** diphenhydrAMINE, hydrALAZINE, HYDROmorphone, lidocaine 4%, lidocaine 4%, lidocaine (buffered or not buffered), lidocaine (buffered or not buffered), lidocaine (buffered or not buffered), melatonin, naloxone **OR** naloxone **OR** naloxone **OR** naloxone, ondansetron **OR** ondansetron, oxyCODONE **OR** oxyCODONE, - MEDICATION INSTRUCTIONS -, prochlorperazine **OR** prochlorperazine, sodium chloride (PF), sodium  chloride (PF), sodium chloride (PF)      Advanced Care Planning:  Discharge planning discussed with patient         [facility]  Fran Kumra

## 2022-02-18 NOTE — PLAN OF CARE
Denies nausea. C/o generalized abdominal pain that has been well controlled with PRN and scheduled medications. Medicating before turns/getting up seems to help with good pain management.      Lap sites x4 are CDI.     Up to commode with assist of one.

## 2022-02-18 NOTE — DISCHARGE INSTRUCTIONS
Follow up: It is our practice to have all patients follow up with us 2-3 weeks after their surgery to ensure they are recovering well.  For straightforward laparoscopic procedures, this can be done either in clinic as a scheduled follow up appointment, or over the phone.  If you would like a scheduled follow up appointment in clinic, please call us at [phone] to schedule an appointment at your convenience.  If you would prefer to follow up with us by phone please let us know so that we may contact you 2-3 weeks following your procedure.        Diet: Regular diet. Patients can have difficulty with constipation following surgery, due in part to the administration of narcotic medications.  If you are suffering with constipation, you should avoid foods such as hard cheeses or red meat.  Foods high in fiber are recommended.      Activity: You should continue to be active at home, including ambulating frequently.  If possible try to limit the amount of time spent in bed.    Restrictions: You have no lifting restrictions post operatively, but may wish to avoid strenuous physical activity for 1-2 weeks.  You should limit your physical activity if it causes you discomfort; however, this should resolve within 1-2 weeks.   Walking does not count as strenuous physical activity.  You are safe to walk up and down stairs.  Following 2 weeks you may resume all normal physical activity.    Wound / drain care: You may remove your Band-aids after a period of 48 hours.  T  It is normal to have a small rim of red present around the incisions. This should not, however, extend beyond 1/4 inch from the incision.  If your incisions become increasingly tender, red, or draining, please contact us.       Bathing: You may shower after 48 hours from surgery.  It is ok to get your incisions wet, but avoid rubbing them.  Avoid soaking in bath tubs, or swimming in lakes, pools, or streams for 4 weeks following surgery.

## 2022-02-19 ENCOUNTER — APPOINTMENT (OUTPATIENT)
Dept: OCCUPATIONAL THERAPY | Facility: HOSPITAL | Age: 87
DRG: 418 | End: 2022-02-19
Payer: MEDICARE

## 2022-02-19 LAB
ANION GAP SERPL CALCULATED.3IONS-SCNC: 8 MMOL/L (ref 5–18)
BUN SERPL-MCNC: 31 MG/DL (ref 8–28)
CALCIUM SERPL-MCNC: 8 MG/DL (ref 8.5–10.5)
CHLORIDE BLD-SCNC: 107 MMOL/L (ref 98–107)
CO2 SERPL-SCNC: 21 MMOL/L (ref 22–31)
CREAT SERPL-MCNC: 1.67 MG/DL (ref 0.6–1.1)
GFR SERPL CREATININE-BSD FRML MDRD: 29 ML/MIN/1.73M2
GLUCOSE BLD-MCNC: 83 MG/DL (ref 70–125)
HOLD SPECIMEN: NORMAL
POTASSIUM BLD-SCNC: 4.9 MMOL/L (ref 3.5–5)
SODIUM SERPL-SCNC: 136 MMOL/L (ref 136–145)

## 2022-02-19 PROCEDURE — 99232 SBSQ HOSP IP/OBS MODERATE 35: CPT | Performed by: INTERNAL MEDICINE

## 2022-02-19 PROCEDURE — 250N000013 HC RX MED GY IP 250 OP 250 PS 637: Performed by: SURGERY

## 2022-02-19 PROCEDURE — 36415 COLL VENOUS BLD VENIPUNCTURE: CPT | Performed by: INTERNAL MEDICINE

## 2022-02-19 PROCEDURE — 80048 BASIC METABOLIC PNL TOTAL CA: CPT | Performed by: INTERNAL MEDICINE

## 2022-02-19 PROCEDURE — 250N000013 HC RX MED GY IP 250 OP 250 PS 637: Performed by: INTERNAL MEDICINE

## 2022-02-19 PROCEDURE — 250N000013 HC RX MED GY IP 250 OP 250 PS 637: Performed by: FAMILY MEDICINE

## 2022-02-19 PROCEDURE — 99207 PR CDG-MDM COMPONENT: MEETS MODERATE - DOWN CODED: CPT | Performed by: INTERNAL MEDICINE

## 2022-02-19 PROCEDURE — 120N000001 HC R&B MED SURG/OB

## 2022-02-19 PROCEDURE — 97535 SELF CARE MNGMENT TRAINING: CPT | Mod: GO

## 2022-02-19 PROCEDURE — 250N000011 HC RX IP 250 OP 636: Performed by: SURGERY

## 2022-02-19 RX ADMIN — LEVOTHYROXINE SODIUM 100 MCG: 0.1 TABLET ORAL at 06:17

## 2022-02-19 RX ADMIN — OXYCODONE HYDROCHLORIDE 2.5 MG: 5 TABLET ORAL at 09:41

## 2022-02-19 RX ADMIN — ACETAMINOPHEN 975 MG: 325 TABLET ORAL at 06:14

## 2022-02-19 RX ADMIN — HEPARIN SODIUM 5000 UNITS: 5000 INJECTION, SOLUTION INTRAVENOUS; SUBCUTANEOUS at 09:24

## 2022-02-19 RX ADMIN — OXYCODONE HYDROCHLORIDE 2.5 MG: 5 TABLET ORAL at 15:40

## 2022-02-19 RX ADMIN — AMLODIPINE BESYLATE 2.5 MG: 2.5 TABLET ORAL at 09:23

## 2022-02-19 RX ADMIN — HEPARIN SODIUM 5000 UNITS: 5000 INJECTION, SOLUTION INTRAVENOUS; SUBCUTANEOUS at 20:51

## 2022-02-19 RX ADMIN — PANTOPRAZOLE SODIUM 40 MG: 40 TABLET, DELAYED RELEASE ORAL at 09:22

## 2022-02-19 RX ADMIN — OXYCODONE HYDROCHLORIDE 5 MG: 5 TABLET ORAL at 00:45

## 2022-02-19 RX ADMIN — CYANOCOBALAMIN TAB 1000 MCG 1000 MCG: 1000 TAB at 09:23

## 2022-02-19 RX ADMIN — FOLIC ACID 1 MG: 1 TABLET ORAL at 09:24

## 2022-02-19 RX ADMIN — OXYCODONE HYDROCHLORIDE 2.5 MG: 5 TABLET ORAL at 22:39

## 2022-02-19 ASSESSMENT — ACTIVITIES OF DAILY LIVING (ADL)
ADLS_ACUITY_SCORE: 14

## 2022-02-19 NOTE — PROGRESS NOTES
Hospitalist Progress Note  ADMIT DATE: 2/15/2022     FACILITY: [facility]    PCP: No Ref-Primary, Physician, None    Assessment/Plan    Lindsey Bermudez is a 89 year old female with history of hypertension and CKD and recent fall injuring her right side admitted to the hospital with worsening right-sided abdominal pain found to have acute cholecystitis     Acute cholecystitis  -Post op day #3 s/p lap michelle  - Abdominal ultrasound showed cholelithiasis with mild gallbladder wall calcificatio was slightly distended common bile duct   - CT abdomen showed cholelithiasis with no bowel obstruction and large stool burden; no rib fracture on the ct although it only showed part of ribs   - stable lft   ---surgery ordered famotidine  ---no fevers off of antibiotics.(was on zosyn)  --- work on pain control     ABL anemia  ---mild, stable     Right side abdominal pain  - suspect cholelithiasis versus fall 1 week ago  - negative x-ray pelvic and right hip  ---cxr negative rib fracture  ---PT and OT consulted  - Continue Tylenol as needed  - Low-dose Dilaudid for breakthrough pain.      Chronic kidney disease stage IV  -Creatinine is about baseline     Hypertension  - improved since admit  ---Elevated blood pressure on ER presentation, probably secondary to pain  - continue home Norvasc  - hydralazine IV as needed     Constipation  - CT abdomen shows large stool burden  - continue miralax and senna  -passed BM last shift     Hypothyroidism  - continue Synthroid     Hyperlipidemia  - Hold statin for now      COVID STATUS negative 2/15  VTE prophylaxis:  Heparin SQ  DIET: Orders Placed This Encounter      Advance Diet as Tolerated: Regular Diet Adult     Disposition; TCU  Barriers to discharge: postop pain, placement  Code Status: Full Code    Subjective  C/o right upper abd pain, no cp/sob, no n/v, no f/c. Passed stool.     Objective    Vital signs in last 24 hours  Temp:  [97.7  F (36.5  C)-98.1  F (36.7   C)] 97.7  F (36.5  C)  Pulse:  [62-71] 71  Resp:  [18-19] 18  BP: (108-136)/(55-67) 126/66  SpO2:  [92 %-96 %] 95 % [unfilled] O2 Device: None (Room air)    Weight:   [unfilled] Weight change:     Intake/Output last 3 shifts  I/O last 3 completed shifts:  In: 360 [P.O.:360]  Out: 300 [Urine:300]  Body mass index is 27.21 kg/m .    Physical Exam    Physical Exam  General appearance: not in acute distress  HEENT: PERRL, EOMI  Lungs: Clear breath sounds in bilateral lung fields  Cardiovascular: Regular rate and rhythm, normal S1-S2  Abdomen: Soft, right upper side of abd tender+, mostly around surgical site, mild distension, normal bowel sound  Musculoskeletal: No joint swelling  Skin: No rash and no edema  Neurology: AAO ×3.  Cranial nerves II - XII normal.  General weakness      Pertinent Labs   Lab Results: none today       Medications  Scheduled Meds:    acetaminophen  975 mg Oral Q8H     amLODIPine  2.5 mg Oral Daily     bisacodyl  10 mg Rectal Daily     cyanocobalamin  1,000 mcg Oral Daily     famotidine  20 mg Oral Q48H     folic acid  1 mg Oral Daily     heparin ANTICOAGULANT  5,000 Units Subcutaneous Q12H     levothyroxine  100 mcg Oral QAM AC     pantoprazole  40 mg Oral QAM AC     polyethylene glycol  17 g Oral Daily     senna-docusate  1 tablet Oral BID    Or     senna-docusate  2 tablet Oral BID     sodium chloride (PF)  3 mL Intracatheter Q8H     Continuous Infusions:    - MEDICATION INSTRUCTIONS -       PRN Meds:.acetaminophen **OR** acetaminophen, bisacodyl, diphenhydrAMINE **OR** diphenhydrAMINE, hydrALAZINE, HYDROmorphone, lidocaine 4%, lidocaine 4%, lidocaine (buffered or not buffered), lidocaine (buffered or not buffered), lidocaine (buffered or not buffered), melatonin, naloxone **OR** naloxone **OR** naloxone **OR** naloxone, ondansetron **OR** ondansetron, oxyCODONE **OR** oxyCODONE, - MEDICATION INSTRUCTIONS -, prochlorperazine **OR** prochlorperazine, sodium chloride (PF), sodium chloride  (PF), sodium chloride (PF)      Advanced Care Planning:  Discharge planning discussed with patient         [facility] Medicine [facility]  Fran Kumar

## 2022-02-19 NOTE — PLAN OF CARE
Goal Outcome Evaluation:    Plan of Care Reviewed With: patient     Overall Patient Progress: improving    Outcome Evaluation: pt able to move in bed      Problem: Adult Inpatient Plan of Care  Goal: Optimal Comfort and Wellbeing  Outcome: Ongoing, Progressing   Patient rating right sided abdominal pain #5, Oxycodone given with good relief.  Patient had multiple large, loose BM, laxative and stool softner held per order.  Patient is able to ambulate with walker and gait belt and assist of one.  Plan is for patient to go to TCU at time of discharge.    Ibeth Redding RN

## 2022-02-19 NOTE — PLAN OF CARE
Goal Outcome Evaluation:    Plan of Care Reviewed With: patient     Overall Patient Progress: improving    Outcome Evaluation: pt able to move in bed        Problem: Adult Inpatient Plan of Care  Goal: Plan of Care Review  Outcome: Ongoing, Progressing     Problem: Adult Inpatient Plan of Care  Goal: Absence of Hospital-Acquired Illness or Injury  Intervention: Identify and Manage Fall Risk  Recent Flowsheet Documentation  Taken 2/18/2022 1609 by Maylin Heath, RN  Safety Promotion/Fall Prevention:   bed alarm on   chair alarm on     Problem: Pain Acute  Goal: Acceptable Pain Control and Functional Ability  Outcome: Ongoing, Progressing  Intervention: Develop Pain Management Plan  Recent Flowsheet Documentation  Taken 2/18/2022 2005 by Maylin Heath, RN  Pain Management Interventions: medication (see MAR)  Taken 2/18/2022 1603 by Maylin Heath, RN  Pain Management Interventions: emotional support   Pt still rating surgical pain around an 8.  Pt didn't get much relief from 2.5 mg dose of oxycodone so I gave the 5 mg dose, pt was sleeping on reassessment.  Sat up in the chair for a few hours and walked in the [place] x1.  Pt had one loose stool, lots of gas.  Tolerating diet, c/o intermittent nausea.

## 2022-02-19 NOTE — PLAN OF CARE
Problem: Pain Acute  Goal: Acceptable Pain Control and Functional Ability  Outcome: Ongoing, Progressing  Intervention: Develop Pain Management Plan  Recent Flowsheet Documentation  Taken 2/19/2022 0045 by Fariba Boone RN  Pain Management Interventions:    medication (see MAR)    breathing exercises    emotional support    pillow support provided    rest    repositioned   Goal Outcome Evaluation:    Plan of Care Reviewed With: patient     Overall Patient Progress: improving    Outcome Evaluation: pt able to move in bed    Patient alert, oriented x 3. Pleasant and co-operative with cares. 4 lap sites intact. No bleeding noted. Complained of right upper quadrant abdomen pain, rating 8/10, administered oxycodone prn, reported some relief and was noted sleeping.

## 2022-02-20 PROCEDURE — 250N000011 HC RX IP 250 OP 636: Performed by: SURGERY

## 2022-02-20 PROCEDURE — 250N000013 HC RX MED GY IP 250 OP 250 PS 637: Performed by: SURGERY

## 2022-02-20 PROCEDURE — 250N000013 HC RX MED GY IP 250 OP 250 PS 637: Performed by: FAMILY MEDICINE

## 2022-02-20 PROCEDURE — 99232 SBSQ HOSP IP/OBS MODERATE 35: CPT | Performed by: INTERNAL MEDICINE

## 2022-02-20 PROCEDURE — 120N000001 HC R&B MED SURG/OB

## 2022-02-20 PROCEDURE — 250N000013 HC RX MED GY IP 250 OP 250 PS 637: Performed by: INTERNAL MEDICINE

## 2022-02-20 RX ADMIN — DOCUSATE SODIUM 50 MG AND SENNOSIDES 8.6 MG 2 TABLET: 8.6; 5 TABLET, FILM COATED ORAL at 08:56

## 2022-02-20 RX ADMIN — FAMOTIDINE 20 MG: 20 TABLET, FILM COATED ORAL at 20:48

## 2022-02-20 RX ADMIN — DOCUSATE SODIUM 50 MG AND SENNOSIDES 8.6 MG 1 TABLET: 8.6; 5 TABLET, FILM COATED ORAL at 20:48

## 2022-02-20 RX ADMIN — OXYCODONE HYDROCHLORIDE 5 MG: 5 TABLET ORAL at 09:10

## 2022-02-20 RX ADMIN — OXYCODONE HYDROCHLORIDE 5 MG: 5 TABLET ORAL at 17:38

## 2022-02-20 RX ADMIN — AMLODIPINE BESYLATE 2.5 MG: 2.5 TABLET ORAL at 08:57

## 2022-02-20 RX ADMIN — CYANOCOBALAMIN TAB 1000 MCG 1000 MCG: 1000 TAB at 08:58

## 2022-02-20 RX ADMIN — HEPARIN SODIUM 5000 UNITS: 5000 INJECTION, SOLUTION INTRAVENOUS; SUBCUTANEOUS at 20:48

## 2022-02-20 RX ADMIN — HEPARIN SODIUM 5000 UNITS: 5000 INJECTION, SOLUTION INTRAVENOUS; SUBCUTANEOUS at 08:57

## 2022-02-20 RX ADMIN — BISACODYL 10 MG: 10 SUPPOSITORY RECTAL at 15:40

## 2022-02-20 RX ADMIN — FOLIC ACID 1 MG: 1 TABLET ORAL at 08:57

## 2022-02-20 RX ADMIN — POLYETHYLENE GLYCOL 3350 17 G: 17 POWDER, FOR SOLUTION ORAL at 08:59

## 2022-02-20 RX ADMIN — PANTOPRAZOLE SODIUM 40 MG: 40 TABLET, DELAYED RELEASE ORAL at 07:04

## 2022-02-20 RX ADMIN — LEVOTHYROXINE SODIUM 100 MCG: 0.1 TABLET ORAL at 07:04

## 2022-02-20 ASSESSMENT — ACTIVITIES OF DAILY LIVING (ADL)
ADLS_ACUITY_SCORE: 14
ADLS_ACUITY_SCORE: 17
ADLS_ACUITY_SCORE: 14
ADLS_ACUITY_SCORE: 15
ADLS_ACUITY_SCORE: 14
ADLS_ACUITY_SCORE: 15
ADLS_ACUITY_SCORE: 17
ADLS_ACUITY_SCORE: 14
ADLS_ACUITY_SCORE: 17
ADLS_ACUITY_SCORE: 17
ADLS_ACUITY_SCORE: 14
ADLS_ACUITY_SCORE: 14
ADLS_ACUITY_SCORE: 17
ADLS_ACUITY_SCORE: 14

## 2022-02-20 NOTE — PLAN OF CARE
Problem: Pain Acute  Goal: Acceptable Pain Control and Functional Ability  Outcome: Ongoing, Progressing   Goal Outcome Evaluation:    Plan of Care Reviewed With: patient     Overall Patient Progress: improving    Outcome Evaluation: pt able to move in bed        Has abdominal pain, rated it 5 out of 10. Her pain increases with movement. No PRN given. Slept. Had stool yesterday.

## 2022-02-20 NOTE — PROGRESS NOTES
Care Management Follow Up    Length of Stay (days): 5    Expected Discharge Date: 02/21/2022?     Concerns to be Addressed:     Continued medical care, TCU placement  Patient plan of care discussed at interdisciplinary rounds: Yes    Anticipated Discharge Disposition:  TCU placement     Anticipated Discharge Services:  Ongoing therapies and nursing care  Anticipated Discharge DME:  Per TCU/therapies    Patient/family educated on Medicare website which has current facility and service quality ratings:  Yes  Education Provided on the Discharge Plan:  Per team  Patient/Family in Agreement with the Plan:  Yes    Referrals Placed by CM/SW:  See below  Private pay costs discussed: Not applicable    Additional Information:  SW reviewed chart and spoke with interdisciplinary team. Per team, pt continues to require acute care with anticipated need for TCU placement.     Per chart review, pt's brother-in-law Ward visits daily from [place] and agreeable with placement close to [facility]. SW sent additional referrals below. Will need  Health transport and PAS at discharge. SW to discuss options with pt and family when known.    TCU Referrals:   - [facility]  - [facility]  - [facility]  - [place]  - [facility]  - [facility]  - [facility]    SW will continue to assess and follow for discharge planning needs.     Margi GONZALEZ LGSW

## 2022-02-20 NOTE — PROGRESS NOTES
Hospitalist Progress Note  ADMIT DATE: 2/15/2022     FACILITY: [facility]    PCP: No Ref-Primary, Physician, None    Assessment/Plan    Lindsey Bermudez is a 89 year old female with history of hypertension and CKD and recent fall injuring her right side admitted to the hospital with worsening right-sided abdominal pain found to have acute cholecystitis     Acute cholecystitis  -Post op day #3 s/p lap michelle  - Abdominal ultrasound showed cholelithiasis with mild gallbladder wall calcificatio was slightly distended common bile duct   - CT abdomen showed cholelithiasis with no bowel obstruction and large stool burden; no rib fracture on the ct although it only showed part of ribs   - stable lft   ---surgery ordered famotidine  ---no fevers off of antibiotics.(was on zosyn)  --- work on pain control     ABL anemia  ---mild, stable     Right side abdominal pain  - suspect cholelithiasis versus fall 1 week ago  - negative x-ray pelvic and right hip  ---cxr negative rib fracture  ---PT and OT consulted  - Continue Tylenol as needed  - Low-dose Dilaudid for breakthrough pain.      Chronic kidney disease stage IV  -Creatinine is about baseline     Hypertension  - improved since admit  ---Elevated blood pressure on ER presentation, probably secondary to pain  - continue home Norvasc  - hydralazine IV as needed     Constipation  - CT abdomen shows large stool burden  - continue miralax and senna  -passed BM last shift     Hypothyroidism  - continue Synthroid     Hyperlipidemia  - Hold statin for now      COVID STATUS negative 2/15  VTE prophylaxis:  Heparin SQ  DIET: Orders Placed This Encounter      Advance Diet as Tolerated: Regular Diet Adult     Disposition; TCU when available  Barriers to discharge: postop pain, placement  Code Status: Full Code       Subjective  Still right abd pain worse with movement; otherwise no complaints    Objective    Vital signs in last 24 hours  Temp:  [98.2  F (36.8   C)-100.5  F (38.1  C)] 98.3  F (36.8  C)  Pulse:  [76-95] 76  Resp:  [16-20] 18  BP: (120-172)/(56-80) 123/57  SpO2:  [93 %-96 %] 93 % [unfilled] O2 Device: None (Room air)    Weight:   [unfilled] Weight change:     Intake/Output last 3 shifts  I/O last 3 completed shifts:  In: 300 [P.O.:300]  Out: 200 [Urine:200]  Body mass index is 27.21 kg/m .    Physical Exam    Physical Exam  General appearance: not in acute distress; obese  HEENT: PERRL, EOMI  Lungs: Clear breath sounds in bilateral lung fields  Cardiovascular: Regular rate and rhythm, normal S1-S2  Abdomen: Soft,+distension, normal bowel sound; rug tenderness  Musculoskeletal: No joint swelling  Skin: No rash and no edema  Neurology: AAO ×3.  Cranial nerves II - XII normal. General weakness      Pertinent Labs   Lab Results: none today      Medications  Scheduled Meds:    amLODIPine  2.5 mg Oral Daily     bisacodyl  10 mg Rectal Daily     cyanocobalamin  1,000 mcg Oral Daily     famotidine  20 mg Oral Q48H     folic acid  1 mg Oral Daily     heparin ANTICOAGULANT  5,000 Units Subcutaneous Q12H     levothyroxine  100 mcg Oral QAM AC     pantoprazole  40 mg Oral QAM AC     polyethylene glycol  17 g Oral Daily     senna-docusate  1 tablet Oral BID    Or     senna-docusate  2 tablet Oral BID     sodium chloride (PF)  3 mL Intracatheter Q8H     Continuous Infusions:    - MEDICATION INSTRUCTIONS -       PRN Meds:.acetaminophen **OR** acetaminophen, bisacodyl, diphenhydrAMINE **OR** diphenhydrAMINE, hydrALAZINE, HYDROmorphone, lidocaine 4%, lidocaine 4%, lidocaine (buffered or not buffered), lidocaine (buffered or not buffered), lidocaine (buffered or not buffered), melatonin, naloxone **OR** naloxone **OR** naloxone **OR** naloxone, ondansetron **OR** ondansetron, oxyCODONE **OR** oxyCODONE, - MEDICATION INSTRUCTIONS -, prochlorperazine **OR** prochlorperazine, sodium chloride (PF), sodium chloride (PF), sodium chloride (PF)      Advanced Care  Planning:  Discharge planning discussed with patient         [facility] Medicine Service  Fran Kumar

## 2022-02-20 NOTE — PLAN OF CARE
Goal Outcome Evaluation:    Plan of Care Reviewed With: patient     Overall Patient Progress: improving    Outcome Evaluation: pt able to move in bed    Lindsey is up with 1 assist. Ambulates to bathroom, to chair- tolerated fairly well. Fair appetite- states nothing sounds good. Lap sites - no sign of infection. Needed lots of encouragement to take suppository. Had a hard formed stool last evening. Falls prevention plan in place. On specialty air mattress overlay as part of wound prevention plan - History of sacrum ulcer. No TCU bed available today. Saida Navarro RN    Problem: Adult Inpatient Plan of Care  Goal: Optimal Comfort and Wellbeing  Outcome: Ongoing, Progressing     Problem: Adult Inpatient Plan of Care  Goal: Readiness for Transition of Care  Outcome: Ongoing, Progressing

## 2022-02-20 NOTE — PLAN OF CARE
Problem: Adult Inpatient Plan of Care  Goal: Absence of Hospital-Acquired Illness or Injury  Intervention: Prevent Skin Injury  Recent Flowsheet Documentation  Taken 2/19/2022 0941 by Ibeth Redding, RN  Body Position: position changed independently   Goal Outcome Evaluation:    Plan of Care Reviewed With: patient     Overall Patient Progress: improving    Outcome Evaluation: pt able to move in bed    Dressing change done to bilateral buttocks, skin reddened, peeling and small scabs in place, Mepilex changed.    Ibeth Redding, RN

## 2022-02-21 ENCOUNTER — APPOINTMENT (OUTPATIENT)
Dept: PHYSICAL THERAPY | Facility: HOSPITAL | Age: 87
DRG: 418 | End: 2022-02-21
Payer: MEDICARE

## 2022-02-21 ENCOUNTER — APPOINTMENT (OUTPATIENT)
Dept: OCCUPATIONAL THERAPY | Facility: HOSPITAL | Age: 87
DRG: 418 | End: 2022-02-21
Payer: MEDICARE

## 2022-02-21 VITALS
HEIGHT: 65 IN | DIASTOLIC BLOOD PRESSURE: 57 MMHG | HEART RATE: 75 BPM | TEMPERATURE: 98.3 F | BODY MASS INDEX: 27.24 KG/M2 | RESPIRATION RATE: 19 BRPM | OXYGEN SATURATION: 92 % | SYSTOLIC BLOOD PRESSURE: 114 MMHG | WEIGHT: 163.5 LBS

## 2022-02-21 LAB
HOLD SPECIMEN: NORMAL
PLATELET # BLD AUTO: 200 10E3/UL (ref 150–450)

## 2022-02-21 PROCEDURE — 97116 GAIT TRAINING THERAPY: CPT | Mod: GP

## 2022-02-21 PROCEDURE — 99239 HOSP IP/OBS DSCHRG MGMT >30: CPT | Performed by: INTERNAL MEDICINE

## 2022-02-21 PROCEDURE — 97129 THER IVNTJ 1ST 15 MIN: CPT | Mod: GO

## 2022-02-21 PROCEDURE — 250N000011 HC RX IP 250 OP 636: Performed by: SURGERY

## 2022-02-21 PROCEDURE — 97535 SELF CARE MNGMENT TRAINING: CPT | Mod: GO

## 2022-02-21 PROCEDURE — 250N000013 HC RX MED GY IP 250 OP 250 PS 637: Performed by: INTERNAL MEDICINE

## 2022-02-21 PROCEDURE — 85049 AUTOMATED PLATELET COUNT: CPT | Performed by: SURGERY

## 2022-02-21 PROCEDURE — 250N000013 HC RX MED GY IP 250 OP 250 PS 637: Performed by: SURGERY

## 2022-02-21 PROCEDURE — 97530 THERAPEUTIC ACTIVITIES: CPT | Mod: GP

## 2022-02-21 PROCEDURE — 250N000013 HC RX MED GY IP 250 OP 250 PS 637: Performed by: NURSE PRACTITIONER

## 2022-02-21 PROCEDURE — 97110 THERAPEUTIC EXERCISES: CPT | Mod: GO

## 2022-02-21 PROCEDURE — 250N000013 HC RX MED GY IP 250 OP 250 PS 637: Performed by: FAMILY MEDICINE

## 2022-02-21 PROCEDURE — 99024 POSTOP FOLLOW-UP VISIT: CPT | Performed by: NURSE PRACTITIONER

## 2022-02-21 PROCEDURE — 36415 COLL VENOUS BLD VENIPUNCTURE: CPT | Performed by: SURGERY

## 2022-02-21 RX ORDER — PANTOPRAZOLE SODIUM 40 MG/1
40 TABLET, DELAYED RELEASE ORAL
DISCHARGE
Start: 2022-02-22 | End: 2022-05-09

## 2022-02-21 RX ORDER — BISACODYL 10 MG
10 SUPPOSITORY, RECTAL RECTAL DAILY PRN
DISCHARGE
Start: 2022-02-21 | End: 2022-10-05

## 2022-02-21 RX ORDER — IBUPROFEN 400 MG/1
400 TABLET, FILM COATED ORAL EVERY 6 HOURS PRN
Status: DISCONTINUED | OUTPATIENT
Start: 2022-02-21 | End: 2022-02-21 | Stop reason: HOSPADM

## 2022-02-21 RX ORDER — AMOXICILLIN 250 MG
2 CAPSULE ORAL
Status: DISCONTINUED | OUTPATIENT
Start: 2022-02-21 | End: 2022-02-21 | Stop reason: HOSPADM

## 2022-02-21 RX ORDER — AMOXICILLIN 250 MG
1 CAPSULE ORAL
Status: DISCONTINUED | OUTPATIENT
Start: 2022-02-21 | End: 2022-02-21 | Stop reason: HOSPADM

## 2022-02-21 RX ORDER — AMOXICILLIN 250 MG
1 CAPSULE ORAL 2 TIMES DAILY PRN
DISCHARGE
Start: 2022-02-21

## 2022-02-21 RX ADMIN — AMLODIPINE BESYLATE 2.5 MG: 2.5 TABLET ORAL at 08:00

## 2022-02-21 RX ADMIN — OXYCODONE HYDROCHLORIDE 5 MG: 5 TABLET ORAL at 03:42

## 2022-02-21 RX ADMIN — BISACODYL 10 MG: 10 SUPPOSITORY RECTAL at 08:01

## 2022-02-21 RX ADMIN — PANTOPRAZOLE SODIUM 40 MG: 40 TABLET, DELAYED RELEASE ORAL at 08:00

## 2022-02-21 RX ADMIN — ACETAMINOPHEN 650 MG: 325 TABLET ORAL at 00:47

## 2022-02-21 RX ADMIN — FOLIC ACID 1 MG: 1 TABLET ORAL at 08:01

## 2022-02-21 RX ADMIN — CYANOCOBALAMIN TAB 1000 MCG 1000 MCG: 1000 TAB at 08:00

## 2022-02-21 RX ADMIN — IBUPROFEN 400 MG: 400 TABLET, FILM COATED ORAL at 11:33

## 2022-02-21 RX ADMIN — HEPARIN SODIUM 5000 UNITS: 5000 INJECTION, SOLUTION INTRAVENOUS; SUBCUTANEOUS at 08:01

## 2022-02-21 RX ADMIN — ACETAMINOPHEN 650 MG: 325 TABLET ORAL at 08:21

## 2022-02-21 RX ADMIN — POLYETHYLENE GLYCOL 3350 17 G: 17 POWDER, FOR SOLUTION ORAL at 08:01

## 2022-02-21 RX ADMIN — DOCUSATE SODIUM 50 MG AND SENNOSIDES 8.6 MG 1 TABLET: 8.6; 5 TABLET, FILM COATED ORAL at 08:00

## 2022-02-21 RX ADMIN — LEVOTHYROXINE SODIUM 100 MCG: 0.1 TABLET ORAL at 08:00

## 2022-02-21 ASSESSMENT — ACTIVITIES OF DAILY LIVING (ADL)
ADLS_ACUITY_SCORE: 17
ADLS_ACUITY_SCORE: 13
ADLS_ACUITY_SCORE: 17
ADLS_ACUITY_SCORE: 13
ADLS_ACUITY_SCORE: 17
ADLS_ACUITY_SCORE: 13
ADLS_ACUITY_SCORE: 17
ADLS_ACUITY_SCORE: 17
ADLS_ACUITY_SCORE: 13
ADLS_ACUITY_SCORE: 17
ADLS_ACUITY_SCORE: 13
ADLS_ACUITY_SCORE: 13
ADLS_ACUITY_SCORE: 17
ADLS_ACUITY_SCORE: 17

## 2022-02-21 NOTE — PLAN OF CARE
Problem: Pain Acute  Goal: Acceptable Pain Control and Functional Ability  Outcome: Ongoing, Progressing  Intervention: Develop Pain Management Plan  Recent Flowsheet Documentation  Taken 2/20/2022 1738 by Darrin Cunningham, RN  Pain Management Interventions: medication (see MAR)  Intervention: Prevent or Manage Pain  Recent Flowsheet Documentation  Taken 2/20/2022 1728 by Darrin Cunningham, RN  Medication Review/Management: medications reviewed     Problem: Constipation  Goal: Effective Bowel Elimination  2/20/2022 9517 by Darrin Cunningham, RN  Outcome: Ongoing, Not Progressing    Outcome: Ongoing, Progressing   Goal Outcome Evaluation:    Plan of Care Reviewed With: patient, family     Overall Patient Progress: improving    Outcome Evaluation: Pt received suppository during day shift and had large loose BM x1 this evening. Abdominal pain from LAP michelle effectively managed with prn oxycodone.    TCU placement pending.

## 2022-02-21 NOTE — PROGRESS NOTES
"ASSESSMENT:  1. Acute cholecystitis    2. Calculus of gallbladder without cholecystitis without obstruction        Lindsey Bermudez is a 89 year old female who is s/p laparoscopic cholecystectomy on 2/16/22. She is doing well from a surgical standpoint. Her back pain and shoulder pain are not from her lap michelle. This pain should try to be managed without narcotics if possible.     She has no signs of infection and crepitus noted on exam is likely secondary to some gas under the skin from the laparoscopic procedure. This should dissipate over the next week.    PLAN:  Use acetaminophen and ibuprofen before going to narcotics for her aches and pains  Surgery discharge recommendations/instructions were placed last week; can discharge from our standpoint    SUBJECTIVE:   She is pleasantly confused. Patient reports pain is \"doing ok\". Reports that pain has been in her back shoulders and off to her right side. She denies nausea and vomiting. Low appetite but tolerating a regular diet.      Patient Vitals for the past 24 hrs:   BP Temp Temp src Pulse Resp SpO2   02/21/22 0730 114/57 98.3  F (36.8  C) Oral 75 19 92 %   02/20/22 2336 123/58 99.4  F (37.4  C) Oral 79 16 92 %   02/20/22 1547 (!) 153/73 98.4  F (36.9  C) Oral 79 18 92 %         PHYSICAL EXAM:  GEN: No acute distress, comfortable  LUNGS: CTA bilaterally  CV:RRR  ABD: soft, not tender, not distended; mild crepitus noted in RUQ above lap incision  EXT:No cyanosis, edema or obvious abnormalities    02/20 0700 - 02/21 0659  In: 1023 [P.O.:1020; I.V.:3]  Out: 151 [Urine:151]    Admission on 02/15/2022   Component Date Value     Hold Specimen 02/15/2022 JIC      Hold Specimen 02/15/2022 JIC      Hold Specimen 02/15/2022 JIC      Hold Specimen 02/15/2022 JI      Sodium 02/15/2022 142      Potassium 02/15/2022 4.8      Chloride 02/15/2022 107      Carbon Dioxide (CO2) 02/15/2022 20 (A)     Anion Gap 02/15/2022 15      Urea Nitrogen 02/15/2022 30 (A)     Creatinine " 02/15/2022 1.61 (A)     Calcium 02/15/2022 8.8      Glucose 02/15/2022 100      GFR Estimate 02/15/2022 30 (A)     Lipase 02/15/2022 46      Bilirubin Total 02/15/2022 0.3      Bilirubin Direct 02/15/2022 0.1      Protein Total 02/15/2022 6.8      Albumin 02/15/2022 3.7      Alkaline Phosphatase 02/15/2022 163 (A)     AST 02/15/2022 15      ALT 02/15/2022 14      Lactic Acid 02/15/2022 0.6 (A)     WBC Count 02/15/2022 5.1      RBC Count 02/15/2022 3.81      Hemoglobin 02/15/2022 11.4 (A)     Hematocrit 02/15/2022 36.5      MCV 02/15/2022 96      MCH 02/15/2022 29.9      MCHC 02/15/2022 31.2 (A)     RDW 02/15/2022 12.4      Platelet Count 02/15/2022 282      % Neutrophils 02/15/2022 68      % Lymphocytes 02/15/2022 17      % Monocytes 02/15/2022 11      % Eosinophils 02/15/2022 3      % Basophils 02/15/2022 1      % Immature Granulocytes 02/15/2022 0      NRBCs per 100 WBC 02/15/2022 0      Absolute Neutrophils 02/15/2022 3.5      Absolute Lymphocytes 02/15/2022 0.9      Absolute Monocytes 02/15/2022 0.6      Absolute Eosinophils 02/15/2022 0.1      Absolute Basophils 02/15/2022 0.0      Absolute Immature Granul* 02/15/2022 0.0      Absolute NRBCs 02/15/2022 0.0      Systolic Blood Pressure 02/15/2022 138      Diastolic Blood Pressure 02/15/2022 83      Ventricular Rate 02/15/2022 86      Atrial Rate 02/15/2022 86      VT Interval 02/15/2022 166      QRS Duration 02/15/2022 90      QT 02/15/2022 386      QTc 02/15/2022 461      P Axis 02/15/2022 44      R AXIS 02/15/2022 -26      T Axis 02/15/2022 26      Interpretation ECG 02/15/2022                      Value:Sinus rhythm  Normal ECG  When compared with ECG of 25-FEB-2020 08:53,  VT interval has decreased  Confirmed by SEE ED PROVIDER NOTE FOR, ECG INTERPRETATION ([id number]),  COLT CERDA ([id number]) on 2/16/2022 9:01:00 AM       SARS CoV2 PCR 02/15/2022 Negative      CRP 02/15/2022 1.7 (A)     WBC Count 02/16/2022 6.1      RBC Count 02/16/2022 3.38 (A)      Hemoglobin 02/16/2022 10.2 (A)     Hematocrit 02/16/2022 32.6 (A)     MCV 02/16/2022 96      MCH 02/16/2022 30.2      MCHC 02/16/2022 31.3 (A)     RDW 02/16/2022 12.6      Platelet Count 02/16/2022 227      Sodium 02/16/2022 142      Potassium 02/16/2022 4.4      Chloride 02/16/2022 110 (A)     Carbon Dioxide (CO2) 02/16/2022 17 (A)     Anion Gap 02/16/2022 15      Urea Nitrogen 02/16/2022 30 (A)     Creatinine 02/16/2022 1.93 (A)     Calcium 02/16/2022 8.2 (A)     Glucose 02/16/2022 66 (A)     Alkaline Phosphatase 02/16/2022 155 (A)     AST 02/16/2022 29      ALT 02/16/2022 22      Protein Total 02/16/2022 6.2      Albumin 02/16/2022 3.1 (A)     Bilirubin Total 02/16/2022 0.6      GFR Estimate 02/16/2022 24 (A)     Bilirubin Direct 02/16/2022 0.2      Case Report 02/16/2022                      Value:Surgical Pathology Report                         Case: [id number]                                  Authorizing Provider:  Moreno Townsend MD Collected:           02/16/2022 10:25 AM          Ordering Location:     [facility]      Received:            02/16/2022 12:18 PM                                 [facility]                                                               Pathologist:           Jes Lewis MD                                                           Specimen:    Gallbladder, gallbladder                                                                    Final Diagnosis 02/16/2022                      Value:This result contains rich text formatting which cannot be displayed here.     Clinical Information 02/16/2022                      Value:This result contains rich text formatting which cannot be displayed here.     Gross Description 02/16/2022                      Value:This result contains rich text formatting which cannot be displayed here.     Microscopic Description 02/16/2022                      Value:This result contains rich text formatting which cannot be  displayed here.     Performing Labs 02/16/2022                      Value:This result contains rich text formatting which cannot be displayed here.     Sodium 02/17/2022 136      Potassium 02/17/2022 4.8      Chloride 02/17/2022 106      Carbon Dioxide (CO2) 02/17/2022 22      Anion Gap 02/17/2022 8      Urea Nitrogen 02/17/2022 33 (A)     Creatinine 02/17/2022 1.99 (A)     Calcium 02/17/2022 7.8 (A)     Glucose 02/17/2022 139 (A)     Alkaline Phosphatase 02/17/2022 135 (A)     AST 02/17/2022 66 (A)     ALT 02/17/2022 71 (A)     Protein Total 02/17/2022 5.9 (A)     Albumin 02/17/2022 2.8 (A)     Bilirubin Total 02/17/2022 0.5      GFR Estimate 02/17/2022 23 (A)     WBC Count 02/17/2022 8.6      RBC Count 02/17/2022 3.10 (A)     Hemoglobin 02/17/2022 9.4 (A)     Hematocrit 02/17/2022 29.3 (A)     MCV 02/17/2022 95      MCH 02/17/2022 30.3      MCHC 02/17/2022 32.1      RDW 02/17/2022 12.6      Platelet Count 02/17/2022       % Neutrophils 02/17/2022 79      % Lymphocytes 02/17/2022 6      % Monocytes 02/17/2022 14      % Eosinophils 02/17/2022 0      % Basophils 02/17/2022 0      % Immature Granulocytes 02/17/2022 1      NRBCs per 100 WBC 02/17/2022 0      Absolute Neutrophils 02/17/2022 6.9      Absolute Lymphocytes 02/17/2022 0.6 (A)     Absolute Monocytes 02/17/2022 1.2      Absolute Eosinophils 02/17/2022 0.0      Absolute Basophils 02/17/2022 0.0      Absolute Immature Granul* 02/17/2022 0.1      Absolute NRBCs 02/17/2022 0.0      GLUCOSE BY METER POCT 02/17/2022 120 (A)     Platelet Assessment 02/17/2022 Platelets Clumped (A)     RBC Morphology 02/17/2022 Confirmed RBC Indices      Color Urine 02/17/2022 Light Yellow      Appearance Urine 02/17/2022 Turbid (A)     Glucose Urine 02/17/2022 Negative      Bilirubin Urine 02/17/2022 Negative      Ketones Urine 02/17/2022 Negative      Specific Gravity Urine 02/17/2022 1.016      Blood Urine 02/17/2022 0.1 mg/dL (A)     pH Urine 02/17/2022 5.5      Protein Albumin  Urine 02/17/2022 10  (A)     Urobilinogen Urine 02/17/2022 <2.0      Nitrite Urine 02/17/2022 Negative      Leukocyte Esterase Urine 02/17/2022 500 Renzo/uL (A)     Bacteria Urine 02/17/2022 Moderate (A)     WBC Clumps Urine 02/17/2022 Present (A)     Mucus Urine 02/17/2022 Present (A)     RBC Urine 02/17/2022 13 (A)     WBC Urine 02/17/2022 105 (A)     Squamous Epithelials Uri* 02/17/2022 1      Culture 02/17/2022 <10,000 CFU/mL Urogenital clyde      Platelet Count 02/18/2022 189      Hemoglobin 02/18/2022 9.7 (A)     Sodium 02/19/2022 136      Potassium 02/19/2022 4.9      Chloride 02/19/2022 107      Carbon Dioxide (CO2) 02/19/2022 21 (A)     Anion Gap 02/19/2022 8      Urea Nitrogen 02/19/2022 31 (A)     Creatinine 02/19/2022 1.67 (A)     Calcium 02/19/2022 8.0 (A)     Glucose 02/19/2022 83      GFR Estimate 02/19/2022 29 (A)     Hold Specimen 02/19/2022 JI      Platelet Count 02/21/2022 200      Hold Specimen 02/21/2022 JILUKE Wayne, SANTANA CNP

## 2022-02-21 NOTE — DISCHARGE SUMMARY
[facility] MEDICINE  DISCHARGE SUMMARY     Primary Care Physician: No Ref-Primary, Physician              Admission Date: 2/15/2022   Discharge Provider: Fran Kumar Discharge Date: 2/21/2022   Diet:   Active Diet and Nourishment Order   Procedures     Advance Diet as Tolerated: Regular Diet Adult     Diet         Activity: DCACTIVITY: Activity as tolerated  As per pt/ot recommendations    Code Status: Full Code         Condition at Discharge: improving      REASON FOR PRESENTATION(See Admission Note for Details)   Abdominal pain    PRINCIPAL & ACTIVE DISCHARGE DIAGNOSES     Active Problems:    Calculus of gallbladder without cholecystitis without obstruction      SIGNIFICANT FINDINGS (Imaging, labs):   EXAM: CT ABDOMEN PELVIS W/O CONTRAST  LOCATION: [facility]  DATE/TIME: 2/15/2022 8:47 AM     INDICATION: RLQ abdominal pain, appendicitis suspected (Age >= 14y)  COMPARISON: 07/25/2017                                                                   IMPRESSION:   1.  There is a quite redundant colon with large stool burden.  2.  No evidence of bowel obstruction or appendicitis.  3.  Bladder is mildly distended and has a large amount of air within it. Query recent catheterization? No findings to indicate a colovesical fistula.  4.  There are punctate bilateral nonobstructing intrarenal calculi with an atrophic right kidney, unchanged.  5.  Cholelithiasis.  6.  Other noncritical findings as noted above.    PENDING LABS   none    PROCEDURES ( this hospitalization only)      Procedure(s):  CHOLECYSTECTOMY, LAPAROSCOPIC    RECOMMENDATION FOR F/U VISIT     Follow up with primary care physician within 1 week  Follow up with surgeon as instructed    DISPOSITION     [facility]    SUMMARY OF HOSPITAL COURSE:      Lindsey Bermudez is a 89 year old female with history of hypertension and CKD and recent fall injuring her right side admitted to the hospital with worsening  right-sided abdominal pain found to have acute cholecystitis     Acute cholecystitis  -s/p lap michelle by surgeon  - Abdominal ultrasound showed cholelithiasis with mild gallbladder wall calcificatio was slightly distended common bile duct   - CT abdomen showed cholelithiasis with no bowel obstruction and large stool burden; no rib fracture on the ct although it only showed part of ribs   - stable lft   ---surgery ordered PPI  ---no fevers off of antibiotics.(was on zosyn)  --- Continue to c/o right abd pain with movement; pain control     ABL anemia  ---mild, stable     Right side abdominal pain  - suspect cholelithiasis versus fall 1 week ago  - negative x-ray pelvic and right hip  ---cxr negative rib fracture  ---PT and OT consulted  - Continue Tylenol as needed  - Low-dose opoid for breakthrough pain.      Chronic kidney disease stage IV  -Creatinine is about baseline     Hypertension  - improved since admit  ---Elevated blood pressure on ER presentation, probably secondary to pain  - continue home Norvasc  - hydralazine IV as needed in hospital     Constipation  - CT abdomen shows large stool burden  - continue miralax and senna  -passed BM      Hypothyroidism  - continue Synthroid     Hyperlipidemia  - Hold statin for now      COVID STATUS negative 2/15  VTE prophylaxis:  Heparin subcutaneous in hospital  DIET: Orders Placed This Encounter      Advance Diet as Tolerated: Regular Diet Adult     Disposition; TCU as per PT/OT evaluation, with general weakness, pain with movement  Code Status: Full Code    Patient's other chronic medical conditions are stable and home medications were continued.    Discharge Medications with Med changes:       Current Discharge Medication List      START taking these medications    Details   acetaminophen (TYLENOL) 325 MG tablet Take 2 tablets (650 mg) by mouth every 6 hours as needed for mild pain or other (and adjunct with moderate or severe pain or per patient request)     Associated Diagnoses: Acute cholecystitis      bisacodyl (DULCOLAX) 10 MG suppository Place 1 suppository (10 mg) rectally daily as needed for constipation (Use if Magnesium hydroxide (MILK of MAGNESIA) not effective after 24 hours. May discontinue if patient having bowel movement.)    Associated Diagnoses: Drug-induced constipation      melatonin 1 MG TABS tablet Take 1 tablet (1 mg) by mouth nightly as needed for sleep    Associated Diagnoses: Insomnia, unspecified type      pantoprazole (PROTONIX) 40 MG EC tablet Take 1 tablet (40 mg) by mouth every morning (before breakfast)    Associated Diagnoses: Gastroesophageal reflux disease with esophagitis without hemorrhage      senna-docusate (SENOKOT-S/PERICOLACE) 8.6-50 MG tablet Take 1 tablet by mouth 2 times daily as needed for constipation    Associated Diagnoses: Acute cholecystitis         CONTINUE these medications which have NOT CHANGED    Details   amLODIPine (NORVASC) 2.5 MG tablet [AMLODIPINE (NORVASC) 2.5 MG TABLET] Take 1 tablet (2.5 mg total) by mouth daily.  Qty: 90 tablet, Refills: 3    Associated Diagnoses: Essential hypertension      aspirin 81 MG EC tablet [ASPIRIN 81 MG EC TABLET] Take 81 mg by mouth daily.      calcium citrate-vitamin D (CITRACAL+D) 315-200 mg-unit per tablet [CALCIUM CITRATE-VITAMIN D (CITRACAL+D) 315-200 MG-UNIT PER TABLET] Take 1 tablet by mouth 2 (two) times a day.      cyanocobalamin 1000 MCG tablet [CYANOCOBALAMIN 1000 MCG TABLET] Take 1 tablet (1,000 mcg total) by mouth daily.  Refills: 0    Associated Diagnoses: Vitamin B12 deficiency (non anemic)      folic acid (FOLVITE) 1 MG tablet [FOLIC ACID (FOLVITE) 1 MG TABLET] Take 1 tablet (1 mg total) by mouth daily.  Qty: 30 tablet, Refills: 0    Associated Diagnoses: Vitamin B12 deficiency (non anemic)      levothyroxine (SYNTHROID, LEVOTHROID) 100 MCG tablet [LEVOTHYROXINE (SYNTHROID, LEVOTHROID) 100 MCG TABLET] TAKE 1 TABLET(100 MCG) BY MOUTH DAILY  Qty: 90 tablet, Refills:  2    Associated Diagnoses: Hypothyroidism      simvastatin (ZOCOR) 20 MG tablet Take 1 tablet (20 mg) by mouth At Bedtime  Qty: 90 tablet, Refills: 1    Associated Diagnoses: Pure hypercholesterolemia               Rationale for medication changes:      Abdominal pain  Constipation    Patient's long term medication were not changed during this hospitalization.    Consults   Surgeon      Discharge Procedure Orders   General info for SNF   Order Comments: Length of Stay Estimate: Short Term Care: Estimated # of Days <30  Condition at Discharge: Improving  Level of care:skilled   Rehabilitation Potential: Good  Admission H&P remains valid and up-to-date: Yes  Recent Chemotherapy: N/A  Use Nursing Home Standing Orders: Yes     Mantoux instructions   Order Comments: Give two-step Mantoux (PPD) Per Facility Policy Yes     Follow Up and recommended labs and tests   Order Comments: Follow up with Nursing home physician.  No follow up labs or test are needed.  Follow up with surgeon as instructed     Reason for your hospital stay   Order Comments: Cholelithiasis, abdominal pain     Activity - Up ad robson     Order Specific Question Answer Comments   Is discharge order? Yes      Full Code     Order Specific Question Answer Comments   Code status determined by: Discussion with patient/ legal decision maker      Physical Therapy Adult Consult   Order Comments: Evaluate and treat as clinically indicated.    Reason:  weakness     Occupational Therapy Adult Consult   Order Comments: Evaluate and treat as clinically indicated.    Reason:  weakness; impaired cognition     Fall precautions     Diet   Order Comments: Follow this diet upon discharge: Orders Placed This Encounter      Advance Diet as Tolerated: Regular Diet Adult     Order Specific Question Answer Comments   Is discharge order? Yes      Examination     Vital Signs in last 24 hours:   vss    General appearance: Not in acute distress; obese  HEENT: PERRL, EOMI  Neck:  Supple, no JVD  Lungs: Clear breath sounds in bilateral lung fields  Cardiovascular: Regular rate and rhythm, normal S1-S2  Abdomen: Soft,right abdominal tender, no distension  Musculoskeletal: No joint swelling  Skin: No rash and no edema  Neurology: AAO ×3.  Cranial nerves II - XII normal.  General weakness.         Please see EMR for more detailed significant labs, imaging, consultant notes etc.    Total time spent on discharge: 50 minutes    Fran Kumar MD (Alex)  [facility] Service: Ph:[phone]    CC:No Ref-Primary, Physician  Results for DESTINY RUCKER (MRN [id number]) as of 2/21/2022 15:01   Ref. Range 2/18/2022 08:08 2/19/2022 09:12   Sodium Latest Ref Range: 136 - 145 mmol/L  136   Potassium Latest Ref Range: 3.5 - 5.0 mmol/L  4.9   Chloride Latest Ref Range: 98 - 107 mmol/L  107   Carbon Dioxide Latest Ref Range: 22 - 31 mmol/L  21 (L)   Urea Nitrogen Latest Ref Range: 8 - 28 mg/dL  31 (H)   Creatinine Latest Ref Range: 0.60 - 1.10 mg/dL  1.67 (H)   GFR Estimate Latest Ref Range: >60 mL/min/1.73m2  29 (L)   Calcium Latest Ref Range: 8.5 - 10.5 mg/dL  8.0 (L)   Anion Gap Latest Ref Range: 5 - 18 mmol/L  8   Glucose Latest Ref Range: 70 - 125 mg/dL  83   Hemoglobin Latest Ref Range: 11.7 - 15.7 g/dL 9.7 (L)    Platelet Count Latest Ref Range: 150 - 450 10e3/uL 189

## 2022-02-21 NOTE — PLAN OF CARE
Physical Therapy Discharge Summary    Reason for therapy discharge:    Discharged to transitional care facility.    Progress towards therapy goal(s). See goals on Care Plan in [facility] electronic health record for goal details.  Goals partially met.  Barriers to achieving goals:   discharge from facility.    Therapy recommendation(s):    Continued therapy is recommended.  Rationale/Recommendations:  TCU for further functional mobility training. .     Raissa Arnett, SPT  2/21/2022             Unknown

## 2022-02-21 NOTE — PROGRESS NOTES
Hospitalist Progress Note  ADMIT DATE: 2/15/2022     FACILITY: [facility]    PCP: No Ref-Primary, Physician, None    Assessment/Plan    Lindsey Bermudez is a 89 year old female with history of hypertension and CKD and recent fall injuring her right side admitted to the hospital with worsening right-sided abdominal pain found to have acute cholecystitis     Acute cholecystitis  -Post op day #3 s/p lap michelle  - Abdominal ultrasound showed cholelithiasis with mild gallbladder wall calcificatio was slightly distended common bile duct   - CT abdomen showed cholelithiasis with no bowel obstruction and large stool burden; no rib fracture on the ct although it only showed part of ribs   - stable lft   ---surgery ordered famotidine  ---no fevers off of antibiotics.(was on zosyn)  --- work on pain control     ABL anemia  ---mild, stable     Right side abdominal pain  - suspect cholelithiasis versus fall 1 week ago  - negative x-ray pelvic and right hip  ---cxr negative rib fracture  ---PT and OT consulted  - Continue Tylenol as needed  - Low-dose Dilaudid for breakthrough pain.      Chronic kidney disease stage IV  -Creatinine is about baseline     Hypertension  - improved since admit  ---Elevated blood pressure on ER presentation, probably secondary to pain  - continue home Norvasc  - hydralazine IV as needed     Constipation  - CT abdomen shows large stool burden  - continue miralax and senna  -passed BM last shift     Hypothyroidism  - continue Synthroid     Hyperlipidemia  - Hold statin for now      COVID STATUS negative 2/15  VTE prophylaxis:  Heparin SQ  DIET: Orders Placed This Encounter      Advance Diet as Tolerated: Regular Diet Adult     Disposition; TCU when available  Barriers to discharge: postop pain, placement  Code Status: Full Code       Subjective  No new complaints, still right flank pain worse with movement    Objective    Vital signs in last 24 hours  Temp:  [98.3  F (36.8   C)-99.4  F (37.4  C)] 99.4  F (37.4  C)  Pulse:  [76-79] 79  Resp:  [16-18] 16  BP: (123-153)/(57-73) 123/58  SpO2:  [92 %-93 %] 92 % [unfilled] O2 Device: None (Room air)    Weight:   [unfilled] Weight change:     Intake/Output last 3 shifts  I/O last 3 completed shifts:  In: 1023 [P.O.:1020; I.V.:3]  Out: 151 [Urine:151]  Body mass index is 27.21 kg/m .    Physical Exam    Physical Exam  General appearance: not in acute distress; obese  HEENT: PERRL, EOMI  Lungs: Clear breath sounds in bilateral lung fields  Cardiovascular: Regular rate and rhythm, normal S1-S2  Abdomen: Soft, ruq tenderness, no distension, normal bowel sound  Musculoskeletal: No joint swelling  Skin: No rash and no edema  Neurology: AAO ×3.  Cranial nerves II - XII normal.  General weakness      Pertinent Labs   Lab Results: personally reviewed.   Results for DESTINY RUCKER (MRN [id number]) as of 2/21/2022 10:27   Ref. Range 2/21/2022 08:06   Platelet Count Latest Ref Range: 150 - 450 10e3/uL 200       Medications  Scheduled Meds:    amLODIPine  2.5 mg Oral Daily     bisacodyl  10 mg Rectal Daily     cyanocobalamin  1,000 mcg Oral Daily     famotidine  20 mg Oral Q48H     folic acid  1 mg Oral Daily     heparin ANTICOAGULANT  5,000 Units Subcutaneous Q12H     levothyroxine  100 mcg Oral QAM AC     pantoprazole  40 mg Oral QAM AC     polyethylene glycol  17 g Oral Daily     senna-docusate  1 tablet Oral BID    Or     senna-docusate  2 tablet Oral BID     sodium chloride (PF)  3 mL Intracatheter Q8H     Continuous Infusions:    - MEDICATION INSTRUCTIONS -       PRN Meds:.acetaminophen **OR** acetaminophen, bisacodyl, diphenhydrAMINE **OR** diphenhydrAMINE, hydrALAZINE, HYDROmorphone, lidocaine 4%, lidocaine 4%, lidocaine (buffered or not buffered), lidocaine (buffered or not buffered), lidocaine (buffered or not buffered), melatonin, naloxone **OR** naloxone **OR** naloxone **OR** naloxone, ondansetron **OR** ondansetron, oxyCODONE **OR**  oxyCODONE, - MEDICATION INSTRUCTIONS -, prochlorperazine **OR** prochlorperazine, sodium chloride (PF), sodium chloride (PF), sodium chloride (PF)      Advanced Care Planning:  Discharge planning discussed with patient         [facility] Medicine Service  Fran Kumar

## 2022-02-21 NOTE — PLAN OF CARE
Occupational Therapy Discharge Summary    Reason for therapy discharge:    Discharged to transitional care facility.    Progress towards therapy goal(s). See goals on Care Plan in [facility] electronic health record for goal details.  discharge to TCU.    Therapy recommendation(s):    Continued therapy is recommended.  Rationale/Recommendations:  Pt is going to a TCU ttto increase skills to highest level for a safe d/c plan..

## 2022-02-21 NOTE — PROGRESS NOTES
Care Management Discharge Note    Discharge Date: 02/21/2022       Discharge Dis[place]  [facility]    Discharge Services:      Discharge DME:      Discharge Transportation:[facility] [employer]    Private pay costs discussed: Not applicable    PAS Confirmation Code:  [id number]   Patient/family educated on Medicare website which has current facility and service quality ratings:  yes    Education Provided on the Discharge Plan:  yes  Persons Notified of Discharge Plans: Ward pt, RN  Patient/Family in Agreement with the Plan: yes    Handoff Referral Completed: Yes    Additional Information:  Pt accepted to [facility] 3:30 [facility] transport wheelchair ride.   Updated Ward - pt's brother in law and pt. Pt was tearful because she thought she could stay here longer.         Mercedez Llanes LGSW

## 2022-02-21 NOTE — PLAN OF CARE
Goal Outcome Evaluation:    Plan of Care Reviewed With: patient, family     Overall Patient Progress: improving    Outcome Evaluation: Pt received suppository during day shift and had large loose BM x1 this evening. Felt incomplete bm emptying. Back pain & abdominal pain from LAP michelle effectively managed with prn oxycodone.

## 2022-02-21 NOTE — PLAN OF CARE
Goal Outcome Evaluation:    Plan of Care Reviewed With: patient, family     Overall Patient Progress: improving    Outcome Evaluation: Pt c/o minimal abdominal pain. Prn tylenol and ibuprofen given for back pain with some relief. Supp given and had small BM. Abdomen soft, lap sites CDI. Tolerating regular diet. PVR done and was less than 300. Pt going to TCU today.

## 2022-02-22 ENCOUNTER — PATIENT OUTREACH (OUTPATIENT)
Dept: CARE COORDINATION | Facility: CLINIC | Age: 87
End: 2022-02-22

## 2022-02-22 DIAGNOSIS — Z71.89 OTHER SPECIFIED COUNSELING: ICD-10-CM

## 2022-02-22 NOTE — PROGRESS NOTES
[facility] GERIATRICS    PRIMARY CARE PROVIDER AND CLINIC:  Physician No Ref-Primary, No address on file  Chief Complaint   Patient presents with     Hospital F/U      [place] Medical Record Number:  [id number]  Place of Service where encounter took place:  [facility] ([facility]) [[id number]]    Lindsey Bermudez  is a 89 year old  (11/6/1931), admitted to the above facility from  [facility]. Hospital stay 2/15/22 through 2/21/22..   HPI: Lindsey has a history of hypertension, CKD and had a recent fall injuring her right side where she presented to the hospital and was admitted with acute cholecystitis.  She did have a lap michelle and is now off antibiotics.   Pelvic and right hip x-rays were negative.  She is on only Tylenol for pain management.   Today: She appears comfortable, sitting up in her chair.  She has been attending PT and OT.  Allows me to examine her abdominal incisions which are small, with no redness, drainage.  No tenderness to palpation.  She is alert and oriented, able to tell me details hospitalization and reason for hospitalization; she lives in an independent apartment.  She does have history of CKD stage IV, will monitor in facility.  No acute concerns, pleasant cooperative with cares.     Disposition; TCU as per PT/OT evaluation, with general weakness, pain with movement  Code Status: Full Code    CODE STATUS/ADVANCE DIRECTIVES DISCUSSION:  Full Code  CPR/Full code   ALLERGIES:   Allergies   Allergen Reactions     Adhesive Tape-Silicones [Adhesive Tape] Unknown     Blisters with some bandage like products      PAST MEDICAL HISTORY:   Past Medical History:   Diagnosis Date     Anemia, unspecified     Created by Conversion      CKD (chronic kidney disease) stage 4, GFR 15-29 ml/min (H) 12/11/2015     Disease of thyroid gland     hypothyroidism     Diverticulitis of colon     Created by Conversion  Replacement Utility updated for latest IMO load     Eczema      Hydronephrosis       Hyperlipidemia      Hypertension      Hypothyroidism     Created by Conversion  Replacement Utility updated for latest IMO load     Osteoarthritis     Created by Conversion  Replacement Utility updated for latest IMO load     Skin cancer      Ureteral stone       PAST SURGICAL HISTORY:   has a past surgical history that includes IR Lumbar Epidural Steroid Injection (5/5/2003); IR Lumbar Epidural Steroid Injection (7/9/2004); IR Lumbar Epidural Steroid Injection (6/1/2007); IR Lumbar Epidural Steroid Injection (6/18/2007); IR Nephrostomy Tube Placement Right (11/9/2015); IR Nephrolithotomy (12/10/2015); Cataract Extraction; back surgery; Breast surgery; Ankle surgery; Combined Cystoscopy, Insert Stent Ureter(s) (N/A, 11/9/2015); Nephrostomy W/ Introduction Of Catheter (Right); and Laparoscopic cholecystectomy (N/A, 2/16/2022).  FAMILY HISTORY: family history includes Cancer in her sister; Cerebrovascular Disease in her mother and sister; Dementia in her sister; Heart Disease in her father.  SOCIAL HISTORY:   reports that she has never smoked. She has never used smokeless tobacco. She reports current alcohol use. She reports that she does not use drugs.  Patient's living condition: lives alone    Post Discharge Medication Reconciliation Status: discharge medications reconciled, continue medications without change  Current Outpatient Medications   Medication Sig     acetaminophen (TYLENOL) 325 MG tablet Take 2 tablets (650 mg) by mouth every 6 hours as needed for mild pain or other (and adjunct with moderate or severe pain or per patient request)     amLODIPine (NORVASC) 2.5 MG tablet [AMLODIPINE (NORVASC) 2.5 MG TABLET] Take 1 tablet (2.5 mg total) by mouth daily.     aspirin 81 MG EC tablet [ASPIRIN 81 MG EC TABLET] Take 81 mg by mouth daily.     bisacodyl (DULCOLAX) 10 MG suppository Place 1 suppository (10 mg) rectally daily as needed for constipation (Use if Magnesium hydroxide (MILK of MAGNESIA) not effective  "after 24 hours. May discontinue if patient having bowel movement.)     calcium citrate-vitamin D (CITRACAL+D) 315-200 mg-unit per tablet [CALCIUM CITRATE-VITAMIN D (CITRACAL+D) 315-200 MG-UNIT PER TABLET] Take 1 tablet by mouth 2 (two) times a day.     cyanocobalamin 1000 MCG tablet [CYANOCOBALAMIN 1000 MCG TABLET] Take 1 tablet (1,000 mcg total) by mouth daily.     folic acid (FOLVITE) 1 MG tablet [FOLIC ACID (FOLVITE) 1 MG TABLET] Take 1 tablet (1 mg total) by mouth daily.     levothyroxine (SYNTHROID, LEVOTHROID) 100 MCG tablet [LEVOTHYROXINE (SYNTHROID, LEVOTHROID) 100 MCG TABLET] TAKE 1 TABLET(100 MCG) BY MOUTH DAILY     melatonin 1 MG TABS tablet Take 1 tablet (1 mg) by mouth nightly as needed for sleep     pantoprazole (PROTONIX) 40 MG EC tablet Take 1 tablet (40 mg) by mouth every morning (before breakfast)     senna-docusate (SENOKOT-S/PERICOLACE) 8.6-50 MG tablet Take 1 tablet by mouth 2 times daily as needed for constipation     simvastatin (ZOCOR) 20 MG tablet Take 1 tablet (20 mg) by mouth At Bedtime     No current facility-administered medications for this visit.       ROS:  4 point ROS including Respiratory, CV, GI and , other than that noted in the HPI,  is negative    Vitals:  BP (!) 163/89   Pulse 78   Temp 97.6  F (36.4  C)   Resp 16   Ht 1.651 m (5' 5\")   Wt 74.3 kg (163 lb 12.8 oz)   SpO2 97%   BMI 27.26 kg/m    Exam:  Physical Exam   General appearance: alert, appears stated age and cooperative.   Head: Normocephalic, without obvious abnormality, atraumatic, Eyes: sclera anicteric.  Lungs: respirations without effort, LSCTA; no wheezing or rales.   Cardiovascular: S1, S2. Regular rate and rhythm.   ABDOMEN: Globular and soft, non tender.    Extremities: extremities normal, atraumatic, no cyanosis or edema  Skin: Skin color, texture, turgor normal. No rashes or lesions  Neurologic:oriented. No focal deficits.   Psych: interacts well with caregivers, exhibits logical thought processes " and connections, pleasant    Lab/Diagnostic data:    Most Recent 3 CBC's:Recent Labs   Lab Test 02/25/22  0520 02/21/22  0806 02/18/22  0808 02/17/22  0808 02/16/22  0901   WBC 5.3  --   --  8.6 6.1   HGB 9.3*  --  9.7* 9.4* 10.2*   MCV 97  --   --  95 96    200 189  --  227     Most Recent 3 BMP's:Recent Labs   Lab Test 02/25/22  0520 02/19/22  0912 02/17/22  0943    136 136   POTASSIUM 4.3 4.9 4.8   CHLORIDE 106 107 106   CO2 21* 21* 22   BUN 32* 31* 33*   CR 1.65* 1.67* 1.99*   ANIONGAP 12 8 8   CHAS 8.9 8.0* 7.8*   GLC 87 83 139*       ASSESSMENT/PLAN:    (K80.20) Calculus of gallbladder without cholecystitis without obstruction  (primary encounter diagnosis)  Comment: Laparoscopic incisions healing well, some bruising throughout abdomen, no redness, tenderness or drainage.  Plan:   -Continue to monitor.    (I10) Essential hypertension  Comment: Generally less than goal of 140/90.  Plan:   -Continue amlodipine 2.5 daily.    (M25.50) Polyarthralgia  Plan:   -Continue Tylenol as needed.    (N18.4) CKD (chronic kidney disease) stage 4, GFR 15-29 ml/min (H)  Comment:   Last Comprehensive Metabolic Panel:  Sodium   Date Value Ref Range Status   02/25/2022 139 136 - 145 mmol/L Final     Potassium   Date Value Ref Range Status   02/25/2022 4.3 3.5 - 5.0 mmol/L Final     Chloride   Date Value Ref Range Status   02/25/2022 106 98 - 107 mmol/L Final     Carbon Dioxide (CO2)   Date Value Ref Range Status   02/25/2022 21 (L) 22 - 31 mmol/L Final     Anion Gap   Date Value Ref Range Status   02/25/2022 12 5 - 18 mmol/L Final     Glucose   Date Value Ref Range Status   02/25/2022 87 70 - 125 mg/dL Final     Urea Nitrogen   Date Value Ref Range Status   02/25/2022 32 (H) 8 - 28 mg/dL Final     Creatinine   Date Value Ref Range Status   02/25/2022 1.65 (H) 0.60 - 1.10 mg/dL Final     GFR Estimate   Date Value Ref Range Status   02/25/2022 29 (L) >60 mL/min/1.73m2 Final     Comment:     Effective December 21, 2021  eGFRcr in adults is calculated using the 2021 CKD-EPI creatinine equation which includes age and gender (Carlee et al., NEJM, DOI: 10.1056/PLZRob7576590)   10/27/2020 26 (L) >60 mL/min/1.73m2 Final     Calcium   Date Value Ref Range Status   02/25/2022 8.9 8.5 - 10.5 mg/dL Final     Plan:   -monitor    Orders:  -Daily weights.  -CBC, CMP next week.    Electronically signed by:  Ap Higgins, ALYSSIA

## 2022-02-22 NOTE — PROGRESS NOTES
Clinic Care Coordination Contact    Background: Care Coordination referral placed from S discharge report for reason of patient meeting criteria for a TCM outreach call by [facility] team.    Assessment: Upon chart review, [facility] Team member will cancel/close the referral for TCM outreach due to reason below:    Patient is not established within [facility]. Upon chart review, CCR[facility] Team member noted patient discharged to TCU    Plan: Care Coordination referral for TCM outreach canceled.    Fariba Wu  Community Health Worker  [facility] [place], [facility]  Ph: [phone]

## 2022-02-23 PROCEDURE — U0005 INFEC AGEN DETEC AMPLI PROBE: HCPCS | Performed by: NURSE PRACTITIONER

## 2022-02-24 ENCOUNTER — LAB REQUISITION (OUTPATIENT)
Dept: LAB | Facility: CLINIC | Age: 87
End: 2022-02-24

## 2022-02-24 ENCOUNTER — TRANSITIONAL CARE UNIT VISIT (OUTPATIENT)
Dept: GERIATRICS | Facility: CLINIC | Age: 87
End: 2022-02-24
Payer: MEDICARE

## 2022-02-24 ENCOUNTER — LAB REQUISITION (OUTPATIENT)
Dept: LAB | Facility: CLINIC | Age: 87
End: 2022-02-24
Payer: MEDICARE

## 2022-02-24 VITALS
RESPIRATION RATE: 16 BRPM | SYSTOLIC BLOOD PRESSURE: 163 MMHG | OXYGEN SATURATION: 97 % | BODY MASS INDEX: 27.29 KG/M2 | WEIGHT: 163.8 LBS | HEART RATE: 78 BPM | HEIGHT: 65 IN | DIASTOLIC BLOOD PRESSURE: 89 MMHG | TEMPERATURE: 97.6 F

## 2022-02-24 DIAGNOSIS — M25.50 POLYARTHRALGIA: ICD-10-CM

## 2022-02-24 DIAGNOSIS — K80.20 CALCULUS OF GALLBLADDER WITHOUT CHOLECYSTITIS WITHOUT OBSTRUCTION: Primary | ICD-10-CM

## 2022-02-24 DIAGNOSIS — N18.4 CHRONIC KIDNEY DISEASE, STAGE 4 (SEVERE) (H): ICD-10-CM

## 2022-02-24 DIAGNOSIS — I10 ESSENTIAL (PRIMARY) HYPERTENSION: ICD-10-CM

## 2022-02-24 DIAGNOSIS — Z11.59 ENCOUNTER FOR SCREENING FOR OTHER VIRAL DISEASES: ICD-10-CM

## 2022-02-24 DIAGNOSIS — N18.4 CKD (CHRONIC KIDNEY DISEASE) STAGE 4, GFR 15-29 ML/MIN (H): ICD-10-CM

## 2022-02-24 DIAGNOSIS — I10 ESSENTIAL HYPERTENSION: ICD-10-CM

## 2022-02-24 LAB — SARS-COV-2 RNA RESP QL NAA+PROBE: NEGATIVE

## 2022-02-24 PROCEDURE — 99305 1ST NF CARE MODERATE MDM 35: CPT | Performed by: NURSE PRACTITIONER

## 2022-02-24 NOTE — LETTER
2/22/2022        RE: Lindsey Bermudez  [address]  [place] [address]        [facility] GERIATRICS    PRIMARY CARE PROVIDER AND CLINIC:  Physician No Ref-Primary, No address on file  Chief Complaint   Patient presents with     Hospital F/U      [place] Medical Record Number:  [id number]  Place of Service where encounter took place:  [facility] ([facility]) [[id number]]    Lindsey Bermudez  is a 89 year old  (11/6/1931), admitted to the above facility from  [facility]. Hospital stay 2/15/22 through 2/21/22..   HPI: Lindsey has a history of hypertension, CKD and had a recent fall injuring her right side where she presented to the hospital and was admitted with acute cholecystitis.  She did have a lap michelle and is now off antibiotics.   Pelvic and right hip x-rays were negative.  She is on only Tylenol for pain management.   Today: She appears comfortable, sitting up in her chair.  She has been attending PT and OT.  Allows me to examine her abdominal incisions which are small, with no redness, drainage.  No tenderness to palpation.  She is alert and oriented, able to tell me details hospitalization and reason for hospitalization; she lives in an independent apartment.  She does have history of CKD stage IV, will monitor in facility.  No acute concerns, pleasant cooperative with cares.     Disposition; TCU as per PT/OT evaluation, with general weakness, pain with movement  Code Status: Full Code    CODE STATUS/ADVANCE DIRECTIVES DISCUSSION:  Full Code  CPR/Full code   ALLERGIES:   Allergies   Allergen Reactions     Adhesive Tape-Silicones [Adhesive Tape] Unknown     Blisters with some bandage like products      PAST MEDICAL HISTORY:   Past Medical History:   Diagnosis Date     Anemia, unspecified     Created by Conversion      CKD (chronic kidney disease) stage 4, GFR 15-29 ml/min (H) 12/11/2015     Disease of thyroid gland     hypothyroidism     Diverticulitis of colon     Created by Conversion   Replacement Utility updated for latest IMO load     Eczema      Hydronephrosis      Hyperlipidemia      Hypertension      Hypothyroidism     Created by Conversion  Replacement Utility updated for latest IMO load     Osteoarthritis     Created by Conversion  Replacement Utility updated for latest IMO load     Skin cancer      Ureteral stone       PAST SURGICAL HISTORY:   has a past surgical history that includes IR Lumbar Epidural Steroid Injection (5/5/2003); IR Lumbar Epidural Steroid Injection (7/9/2004); IR Lumbar Epidural Steroid Injection (6/1/2007); IR Lumbar Epidural Steroid Injection (6/18/2007); IR Nephrostomy Tube Placement Right (11/9/2015); IR Nephrolithotomy (12/10/2015); Cataract Extraction; back surgery; Breast surgery; Ankle surgery; Combined Cystoscopy, Insert Stent Ureter(s) (N/A, 11/9/2015); Nephrostomy W/ Introduction Of Catheter (Right); and Laparoscopic cholecystectomy (N/A, 2/16/2022).  FAMILY HISTORY: family history includes Cancer in her sister; Cerebrovascular Disease in her mother and sister; Dementia in her sister; Heart Disease in her father.  SOCIAL HISTORY:   reports that she has never smoked. She has never used smokeless tobacco. She reports current alcohol use. She reports that she does not use drugs.  Patient's living condition: lives alone    Post Discharge Medication Reconciliation Status: discharge medications reconciled, continue medications without change  Current Outpatient Medications   Medication Sig     acetaminophen (TYLENOL) 325 MG tablet Take 2 tablets (650 mg) by mouth every 6 hours as needed for mild pain or other (and adjunct with moderate or severe pain or per patient request)     amLODIPine (NORVASC) 2.5 MG tablet [AMLODIPINE (NORVASC) 2.5 MG TABLET] Take 1 tablet (2.5 mg total) by mouth daily.     aspirin 81 MG EC tablet [ASPIRIN 81 MG EC TABLET] Take 81 mg by mouth daily.     bisacodyl (DULCOLAX) 10 MG suppository Place 1 suppository (10 mg) rectally daily as  "needed for constipation (Use if Magnesium hydroxide (MILK of MAGNESIA) not effective after 24 hours. May discontinue if patient having bowel movement.)     calcium citrate-vitamin D (CITRACAL+D) 315-200 mg-unit per tablet [CALCIUM CITRATE-VITAMIN D (CITRACAL+D) 315-200 MG-UNIT PER TABLET] Take 1 tablet by mouth 2 (two) times a day.     cyanocobalamin 1000 MCG tablet [CYANOCOBALAMIN 1000 MCG TABLET] Take 1 tablet (1,000 mcg total) by mouth daily.     folic acid (FOLVITE) 1 MG tablet [FOLIC ACID (FOLVITE) 1 MG TABLET] Take 1 tablet (1 mg total) by mouth daily.     levothyroxine (SYNTHROID, LEVOTHROID) 100 MCG tablet [LEVOTHYROXINE (SYNTHROID, LEVOTHROID) 100 MCG TABLET] TAKE 1 TABLET(100 MCG) BY MOUTH DAILY     melatonin 1 MG TABS tablet Take 1 tablet (1 mg) by mouth nightly as needed for sleep     pantoprazole (PROTONIX) 40 MG EC tablet Take 1 tablet (40 mg) by mouth every morning (before breakfast)     senna-docusate (SENOKOT-S/PERICOLACE) 8.6-50 MG tablet Take 1 tablet by mouth 2 times daily as needed for constipation     simvastatin (ZOCOR) 20 MG tablet Take 1 tablet (20 mg) by mouth At Bedtime     No current facility-administered medications for this visit.       ROS:  4 point ROS including Respiratory, CV, GI and , other than that noted in the HPI,  is negative    Vitals:  BP (!) 163/89   Pulse 78   Temp 97.6  F (36.4  C)   Resp 16   Ht 1.651 m (5' 5\")   Wt 74.3 kg (163 lb 12.8 oz)   SpO2 97%   BMI 27.26 kg/m    Exam:  Physical Exam   General appearance: alert, appears stated age and cooperative.   Head: Normocephalic, without obvious abnormality, atraumatic, Eyes: sclera anicteric.  Lungs: respirations without effort, LSCTA; no wheezing or rales.   Cardiovascular: S1, S2. Regular rate and rhythm.   ABDOMEN: Globular and soft, non tender.    Extremities: extremities normal, atraumatic, no cyanosis or edema  Skin: Skin color, texture, turgor normal. No rashes or lesions  Neurologic:oriented. No focal " deficits.   Psych: interacts well with caregivers, exhibits logical thought processes and connections, pleasant    Lab/Diagnostic data:    Most Recent 3 CBC's:Recent Labs   Lab Test 02/25/22  0520 02/21/22  0806 02/18/22  0808 02/17/22  0808 02/16/22  0901   WBC 5.3  --   --  8.6 6.1   HGB 9.3*  --  9.7* 9.4* 10.2*   MCV 97  --   --  95 96    200 189  --  227     Most Recent 3 BMP's:Recent Labs   Lab Test 02/25/22  0520 02/19/22  0912 02/17/22  0943    136 136   POTASSIUM 4.3 4.9 4.8   CHLORIDE 106 107 106   CO2 21* 21* 22   BUN 32* 31* 33*   CR 1.65* 1.67* 1.99*   ANIONGAP 12 8 8   CHAS 8.9 8.0* 7.8*   GLC 87 83 139*       ASSESSMENT/PLAN:    (K80.20) Calculus of gallbladder without cholecystitis without obstruction  (primary encounter diagnosis)  Comment: Laparoscopic incisions healing well, some bruising throughout abdomen, no redness, tenderness or drainage.  Plan:   -Continue to monitor.    (I10) Essential hypertension  Comment: Generally less than goal of 140/90.  Plan:   -Continue amlodipine 2.5 daily.    (M25.50) Polyarthralgia  Plan:   -Continue Tylenol as needed.    (N18.4) CKD (chronic kidney disease) stage 4, GFR 15-29 ml/min (H)  Comment:   Last Comprehensive Metabolic Panel:  Sodium   Date Value Ref Range Status   02/25/2022 139 136 - 145 mmol/L Final     Potassium   Date Value Ref Range Status   02/25/2022 4.3 3.5 - 5.0 mmol/L Final     Chloride   Date Value Ref Range Status   02/25/2022 106 98 - 107 mmol/L Final     Carbon Dioxide (CO2)   Date Value Ref Range Status   02/25/2022 21 (L) 22 - 31 mmol/L Final     Anion Gap   Date Value Ref Range Status   02/25/2022 12 5 - 18 mmol/L Final     Glucose   Date Value Ref Range Status   02/25/2022 87 70 - 125 mg/dL Final     Urea Nitrogen   Date Value Ref Range Status   02/25/2022 32 (H) 8 - 28 mg/dL Final     Creatinine   Date Value Ref Range Status   02/25/2022 1.65 (H) 0.60 - 1.10 mg/dL Final     GFR Estimate   Date Value Ref Range Status    02/25/2022 29 (L) >60 mL/min/1.73m2 Final     Comment:     Effective December 21, 2021 eGFRcr in adults is calculated using the 2021 CKD-EPI creatinine equation which includes age and gender (Carlee et al., NEJM, DOI: 10.1056/MYLSay3475871)   10/27/2020 26 (L) >60 mL/min/1.73m2 Final     Calcium   Date Value Ref Range Status   02/25/2022 8.9 8.5 - 10.5 mg/dL Final     Plan:   -monitor    Orders:  -Daily weights.  -CBC, CMP next week.    Electronically signed by:  Ap Higgins CNP                       Sincerely,        pA Higgins, CNP

## 2022-02-25 LAB
ANION GAP SERPL CALCULATED.3IONS-SCNC: 12 MMOL/L (ref 5–18)
BUN SERPL-MCNC: 32 MG/DL (ref 8–28)
CALCIUM SERPL-MCNC: 8.9 MG/DL (ref 8.5–10.5)
CHLORIDE BLD-SCNC: 106 MMOL/L (ref 98–107)
CO2 SERPL-SCNC: 21 MMOL/L (ref 22–31)
CREAT SERPL-MCNC: 1.65 MG/DL (ref 0.6–1.1)
ERYTHROCYTE [DISTWIDTH] IN BLOOD BY AUTOMATED COUNT: 12.3 % (ref 10–15)
GFR SERPL CREATININE-BSD FRML MDRD: 29 ML/MIN/1.73M2
GLUCOSE BLD-MCNC: 87 MG/DL (ref 70–125)
HCT VFR BLD AUTO: 30.3 % (ref 35–47)
HGB BLD-MCNC: 9.3 G/DL (ref 11.7–15.7)
MCH RBC QN AUTO: 29.6 PG (ref 26.5–33)
MCHC RBC AUTO-ENTMCNC: 30.7 G/DL (ref 31.5–36.5)
MCV RBC AUTO: 97 FL (ref 78–100)
PLATELET # BLD AUTO: 365 10E3/UL (ref 150–450)
POTASSIUM BLD-SCNC: 4.3 MMOL/L (ref 3.5–5)
RBC # BLD AUTO: 3.14 10E6/UL (ref 3.8–5.2)
SODIUM SERPL-SCNC: 139 MMOL/L (ref 136–145)
WBC # BLD AUTO: 5.3 10E3/UL (ref 4–11)

## 2022-02-25 PROCEDURE — 36415 COLL VENOUS BLD VENIPUNCTURE: CPT | Performed by: NURSE PRACTITIONER

## 2022-02-25 PROCEDURE — 80048 BASIC METABOLIC PNL TOTAL CA: CPT | Performed by: NURSE PRACTITIONER

## 2022-02-25 PROCEDURE — 85027 COMPLETE CBC AUTOMATED: CPT | Performed by: NURSE PRACTITIONER

## 2022-02-25 PROCEDURE — P9604 ONE-WAY ALLOW PRORATED TRIP: HCPCS | Performed by: NURSE PRACTITIONER

## 2022-02-28 PROCEDURE — U0003 INFECTIOUS AGENT DETECTION BY NUCLEIC ACID (DNA OR RNA); SEVERE ACUTE RESPIRATORY SYNDROME CORONAVIRUS 2 (SARS-COV-2) (CORONAVIRUS DISEASE [COVID-19]), AMPLIFIED PROBE TECHNIQUE, MAKING USE OF HIGH THROUGHPUT TECHNOLOGIES AS DESCRIBED BY CMS-2020-01-R: HCPCS | Performed by: NURSE PRACTITIONER

## 2022-03-01 ENCOUNTER — LAB REQUISITION (OUTPATIENT)
Dept: LAB | Facility: CLINIC | Age: 87
End: 2022-03-01

## 2022-03-01 DIAGNOSIS — Z11.59 ENCOUNTER FOR SCREENING FOR OTHER VIRAL DISEASES: ICD-10-CM

## 2022-03-01 LAB — SARS-COV-2 RNA RESP QL NAA+PROBE: NEGATIVE

## 2022-03-02 ENCOUNTER — LAB REQUISITION (OUTPATIENT)
Dept: LAB | Facility: CLINIC | Age: 87
End: 2022-03-02

## 2022-03-02 DIAGNOSIS — Z11.59 ENCOUNTER FOR SCREENING FOR OTHER VIRAL DISEASES: ICD-10-CM

## 2022-03-02 PROCEDURE — U0003 INFECTIOUS AGENT DETECTION BY NUCLEIC ACID (DNA OR RNA); SEVERE ACUTE RESPIRATORY SYNDROME CORONAVIRUS 2 (SARS-COV-2) (CORONAVIRUS DISEASE [COVID-19]), AMPLIFIED PROBE TECHNIQUE, MAKING USE OF HIGH THROUGHPUT TECHNOLOGIES AS DESCRIBED BY CMS-2020-01-R: HCPCS | Performed by: NURSE PRACTITIONER

## 2022-03-03 ENCOUNTER — TRANSITIONAL CARE UNIT VISIT (OUTPATIENT)
Dept: GERIATRICS | Facility: CLINIC | Age: 87
End: 2022-03-03
Payer: MEDICARE

## 2022-03-03 VITALS
OXYGEN SATURATION: 95 % | DIASTOLIC BLOOD PRESSURE: 68 MMHG | HEART RATE: 65 BPM | BODY MASS INDEX: 27.71 KG/M2 | RESPIRATION RATE: 18 BRPM | TEMPERATURE: 97.5 F | SYSTOLIC BLOOD PRESSURE: 121 MMHG | HEIGHT: 65 IN | WEIGHT: 166.3 LBS

## 2022-03-03 DIAGNOSIS — N25.81 SECONDARY HYPERPARATHYROIDISM OF RENAL ORIGIN (H): ICD-10-CM

## 2022-03-03 DIAGNOSIS — D62 ANEMIA DUE TO BLOOD LOSS, ACUTE: ICD-10-CM

## 2022-03-03 DIAGNOSIS — N18.30 STAGE 3 CHRONIC KIDNEY DISEASE, UNSPECIFIED WHETHER STAGE 3A OR 3B CKD (H): ICD-10-CM

## 2022-03-03 DIAGNOSIS — R10.9 RIGHT FLANK PAIN: Primary | ICD-10-CM

## 2022-03-03 DIAGNOSIS — K59.01 SLOW TRANSIT CONSTIPATION: ICD-10-CM

## 2022-03-03 DIAGNOSIS — I12.9 BENIGN HYPERTENSIVE KIDNEY DISEASE WITH CHRONIC KIDNEY DISEASE STAGE I THROUGH STAGE IV, OR UNSPECIFIED: ICD-10-CM

## 2022-03-03 DIAGNOSIS — R53.81 PHYSICAL DECONDITIONING: ICD-10-CM

## 2022-03-03 LAB — SARS-COV-2 RNA RESP QL NAA+PROBE: NEGATIVE

## 2022-03-03 PROCEDURE — 99309 SBSQ NF CARE MODERATE MDM 30: CPT | Performed by: INTERNAL MEDICINE

## 2022-03-03 RX ORDER — LIDOCAINE HCL 4% 4 G/100G
CREAM TOPICAL 2 TIMES DAILY
COMMUNITY
End: 2022-11-02

## 2022-03-03 NOTE — PROGRESS NOTES
"[facility] GERIATRICS  TCU Episodic Visit   March 3, 2022      Chief Complaint   Patient presents with     Nursing Home Acute     right flank pain, constipation, acute blood loss anemia, physical deconditioning       HPI:    Lindsey Bermudez is a 89 year old  (11/6/1931), who is being seen today for an episodic care visit at [facility] where she is doing rehab after a hospitalization with cholecystitis s/p lap michelle (2/16/22). Imaging that admission with large stool burden as well as punctate bilateral non obstructing intrarenal calculi with atrophic R kidney. One week prior to hospitalization she slipped and fell on some ice in her driveway.     Today, Ms. Bermudez is seen in her room sitting in a recliner. She recalled the fall a couple of weeks ago in her driveway and that the morning she went into the hospital she woke with intense right upper abdominal pain that brought her to tears.     She had a rough night with pain on her right side. She said a couple times \"my memory isn't good\" and wasn't able share consistent specifics, but point to an area on her right flank/side near bottom of rib cage. Unable to reproduce it on exam. She was clear it isn't abdominal pain. No dysuria or frequency. Not worst with eating. Maybe worse with some movements. She feels a bit constipated. BIMS 12/15.     Talked with nursing and they note she's constipated as well. They've not heard the pain concern. The house stock is Aspercreme with lido and Lindsey is OK trying this to see if it helps.     ALLERGIES: Adhesive tape-silicones [adhesive tape]    Past Medical, Surgical, Family and Social History reviewed and updated in EPIC.    MEDICATIONS  Current Outpatient Medications   Medication Sig Dispense Refill     acetaminophen (TYLENOL) 325 MG tablet Take 2 tablets (650 mg) by mouth every 6 hours as needed for mild pain or other (and adjunct with moderate or severe pain or per patient request)       amLODIPine (NORVASC) " 2.5 MG tablet [AMLODIPINE (NORVASC) 2.5 MG TABLET] Take 1 tablet (2.5 mg total) by mouth daily. 90 tablet 3     aspirin 81 MG EC tablet [ASPIRIN 81 MG EC TABLET] Take 81 mg by mouth daily.       bisacodyl (DULCOLAX) 10 MG suppository Place 1 suppository (10 mg) rectally daily as needed for constipation (Use if Magnesium hydroxide (MILK of MAGNESIA) not effective after 24 hours. May discontinue if patient having bowel movement.)       calcium citrate-vitamin D (CITRACAL+D) 315-200 mg-unit per tablet [CALCIUM CITRATE-VITAMIN D (CITRACAL+D) 315-200 MG-UNIT PER TABLET] Take 1 tablet by mouth 2 (two) times a day.       cyanocobalamin 1000 MCG tablet [CYANOCOBALAMIN 1000 MCG TABLET] Take 1 tablet (1,000 mcg total) by mouth daily.  0     folic acid (FOLVITE) 1 MG tablet [FOLIC ACID (FOLVITE) 1 MG TABLET] Take 1 tablet (1 mg total) by mouth daily. 30 tablet 0     levothyroxine (SYNTHROID, LEVOTHROID) 100 MCG tablet [LEVOTHYROXINE (SYNTHROID, LEVOTHROID) 100 MCG TABLET] TAKE 1 TABLET(100 MCG) BY MOUTH DAILY 90 tablet 2     lidocaine (ASPERCREME LIDOCAINE) 4 % external cream Apply topically 2 times daily And BID for back/side pain       melatonin 1 MG TABS tablet Take 1 tablet (1 mg) by mouth nightly as needed for sleep       pantoprazole (PROTONIX) 40 MG EC tablet Take 1 tablet (40 mg) by mouth every morning (before breakfast)       senna-docusate (SENOKOT-S/PERICOLACE) 8.6-50 MG tablet Take 1 tablet by mouth 2 times daily as needed for constipation (Patient taking differently: Take 1 tablet by mouth 2 times daily And 1 tab BID PRN)       simvastatin (ZOCOR) 20 MG tablet Take 1 tablet (20 mg) by mouth At Bedtime 90 tablet 1     Medications reviewed:  Medications reconciled to facility chart and changes were made to reflect current medications as identified as above med list. Below are the changes that were made:   Medications stopped since last EPIC medication reconciliation:   There are no discontinued  "medications.  Medications started since last [facility] medication reconciliation:  Orders Placed This Encounter   Medications     lidocaine (ASPERCREME LIDOCAINE) 4 % external cream     Sig: Apply topically 2 times daily And BID for back/side pain       REVIEW OF SYSTEMS:  4 point ROS neg other than the symptoms noted above in the HPI. BIMS 12/15.     PHYSICAL EXAM:  /68   Pulse 65   Temp 97.5  F (36.4  C)   Resp 18   Ht 1.651 m (5' 5\")   Wt 75.4 kg (166 lb 4.8 oz)   SpO2 95%   BMI 27.67 kg/m    Gen: sitting in recliner, alert, cooperative and in no acute distress  Resp: lungs clear to auscultation bilaterally, no crackles or wheezes   GI: abdomen soft, not-tender  : no right CVA tenderness  Ext: no LE edema  Neuro: CX II-XII grossly in tact; ROM in all four extremities grossly in tact  Psych: alert and oriented to self and general situation  Skin: ecchymoses on lateral right flank at bottom of rib cage; she has a almost baseball sized mobile and firm area near the bruise consistent with a hematoma    LABS & IMAGING  Recent Labs and Imaging:  Reviewed as per Epic    ASSESSMENT/PLAN:    Right Side/Flank Pain  Unable to reproduce on exam. She's got a bruise and a hematoma - ?if that's from the fall earlier in February. Not clear that it would be related to her surgery as it's subQ. No dysuria. She is constipated. No abdominal pain. No fevers. Suspect this is MSK.   -- start aspercreme + lido BID and BID PRN to R flank/side  -- will see her again on 3/8 - if not improving could consider an ultrasound to better evaluate the ?hematoma and we could get plain films/rib series - she may have an occult rib fracture from that fall     Cholecystitis s/p Lap Angeles (2/16/22)  Incisions are well healed.   -- follow clinically     Acute Blood Loss Anemia  Secondary to above. No evidence of ongoing bleeding. Baseline Hgb around 11. Hgb 11.4 --> 9.3.   -- follow clinically; would be hesitant to start Fe supplement with the " constipation     HTN  SBPs 110s  -- amlodipine 2.5 mg daily   -- follow BPs and adjust medications as needed    CKD, Stage III  Secondary Hyperparathyroid  Baseline Cr around 1.6. She follows with [facility] - most recent visit in Sept and labs at goal for the secondary hyperpara.   -- Ca+D supplement  -- periodic BMP    Slow Transit Constipation  Both Lindsey and nursing reporting constipation  -- add scheduled Senna to PRN  -- adjust bowel regimen as needed    Physical Deconditioning  In setting of hospitalization and underlying medical conditions  -- ongoing PT/OT      Electronically signed by  Keri Yo MD

## 2022-03-03 NOTE — LETTER
"    3/3/2022        RE: Lindsey Bermudez  [address]  [facility] [id number]        [facility] GERIATRICS  TCU Episodic Visit   March 3, 2022      Chief Complaint   Patient presents with     Nursing Home Acute     right flank pain, constipation, acute blood loss anemia, physical deconditioning       HPI:    Lindsey Bermudez is a 89 year old  (11/6/1931), who is being seen today for an episodic care visit at [facility] where she is doing rehab after a hospitalization with cholecystitis s/p lap michelle (2/16/22). Imaging that admission with large stool burden as well as punctate bilateral non obstructing intrarenal calculi with atrophic R kidney. One week prior to hospitalization she slipped and fell on some ice in her driveway.     Today, Ms. Bermudez is seen in her room sitting in a recliner. She recalled the fall a couple of weeks ago in her driveway and that the morning she went into the hospital she woke with intense right upper abdominal pain that brought her to tears.     She had a rough night with pain on her right side. She said a couple times \"my memory isn't good\" and wasn't able share consistent specifics, but point to an area on her right flank/side near bottom of rib cage. Unable to reproduce it on exam. She was clear it isn't abdominal pain. No dysuria or frequency. Not worst with eating. Maybe worse with some movements. She feels a bit constipated. BIMS 12/15.     Talked with nursing and they note she's constipated as well. They've not heard the pain concern. The house stock is Aspercreme with lido and Lindsey is OK trying this to see if it helps.     ALLERGIES: Adhesive tape-silicones [adhesive tape]    Past Medical, Surgical, Family and Social History reviewed and updated in EPIC.    MEDICATIONS  Current Outpatient Medications   Medication Sig Dispense Refill     acetaminophen (TYLENOL) 325 MG tablet Take 2 tablets (650 mg) by mouth every 6 hours as needed for mild pain or other (and " adjunct with moderate or severe pain or per patient request)       amLODIPine (NORVASC) 2.5 MG tablet [AMLODIPINE (NORVASC) 2.5 MG TABLET] Take 1 tablet (2.5 mg total) by mouth daily. 90 tablet 3     aspirin 81 MG EC tablet [ASPIRIN 81 MG EC TABLET] Take 81 mg by mouth daily.       bisacodyl (DULCOLAX) 10 MG suppository Place 1 suppository (10 mg) rectally daily as needed for constipation (Use if Magnesium hydroxide (MILK of MAGNESIA) not effective after 24 hours. May discontinue if patient having bowel movement.)       calcium citrate-vitamin D (CITRACAL+D) 315-200 mg-unit per tablet [CALCIUM CITRATE-VITAMIN D (CITRACAL+D) 315-200 MG-UNIT PER TABLET] Take 1 tablet by mouth 2 (two) times a day.       cyanocobalamin 1000 MCG tablet [CYANOCOBALAMIN 1000 MCG TABLET] Take 1 tablet (1,000 mcg total) by mouth daily.  0     folic acid (FOLVITE) 1 MG tablet [FOLIC ACID (FOLVITE) 1 MG TABLET] Take 1 tablet (1 mg total) by mouth daily. 30 tablet 0     levothyroxine (SYNTHROID, LEVOTHROID) 100 MCG tablet [LEVOTHYROXINE (SYNTHROID, LEVOTHROID) 100 MCG TABLET] TAKE 1 TABLET(100 MCG) BY MOUTH DAILY 90 tablet 2     lidocaine (ASPERCREME LIDOCAINE) 4 % external cream Apply topically 2 times daily And BID for back/side pain       melatonin 1 MG TABS tablet Take 1 tablet (1 mg) by mouth nightly as needed for sleep       pantoprazole (PROTONIX) 40 MG EC tablet Take 1 tablet (40 mg) by mouth every morning (before breakfast)       senna-docusate (SENOKOT-S/PERICOLACE) 8.6-50 MG tablet Take 1 tablet by mouth 2 times daily as needed for constipation (Patient taking differently: Take 1 tablet by mouth 2 times daily And 1 tab BID PRN)       simvastatin (ZOCOR) 20 MG tablet Take 1 tablet (20 mg) by mouth At Bedtime 90 tablet 1     Medications reviewed:  Medications reconciled to facility chart and changes were made to reflect current medications as identified as above med list. Below are the changes that were made:   Medications stopped  "since last [facility] medication reconciliation:   There are no discontinued medications.  Medications started since last [facility] medication reconciliation:  Orders Placed This Encounter   Medications     lidocaine (ASPERCREME LIDOCAINE) 4 % external cream     Sig: Apply topically 2 times daily And BID for back/side pain       REVIEW OF SYSTEMS:  4 point ROS neg other than the symptoms noted above in the HPI. BIMS 12/15.     PHYSICAL EXAM:  /68   Pulse 65   Temp 97.5  F (36.4  C)   Resp 18   Ht 1.651 m (5' 5\")   Wt 75.4 kg (166 lb 4.8 oz)   SpO2 95%   BMI 27.67 kg/m    Gen: sitting in recliner, alert, cooperative and in no acute distress  Resp: lungs clear to auscultation bilaterally, no crackles or wheezes   GI: abdomen soft, not-tender  : no right CVA tenderness  Ext: no LE edema  Neuro: CX II-XII grossly in tact; ROM in all four extremities grossly in tact  Psych: alert and oriented to self and general situation  Skin: ecchymoses on lateral right flank at bottom of rib cage; she has a almost baseball sized mobile and firm area near the bruise consistent with a hematoma    LABS & IMAGING  Recent Labs and Imaging:  Reviewed as per Epic    ASSESSMENT/PLAN:    Right Side/Flank Pain  Unable to reproduce on exam. She's got a bruise and a hematoma - ?if that's from the fall earlier in February. Not clear that it would be related to her surgery as it's subQ. No dysuria. She is constipated. No abdominal pain. No fevers. Suspect this is MSK.   -- start aspercreme + lido BID and BID PRN to R flank/side  -- will see her again on 3/8 - if not improving could consider an ultrasound to better evaluate the ?hematoma and we could get plain films/rib series - she may have an occult rib fracture from that fall     Cholecystitis s/p Lap Angeles (2/16/22)  Incisions are well healed.   -- follow clinically     Acute Blood Loss Anemia  Secondary to above. No evidence of ongoing bleeding. Baseline Hgb around 11. Hgb 11.4 --> 9.3. "   -- follow clinically; would be hesitant to start Fe supplement with the constipation     HTN  SBPs 110s  -- amlodipine 2.5 mg daily   -- follow BPs and adjust medications as needed    CKD, Stage III  Secondary Hyperparathyroid  Baseline Cr around 1.6. She follows with [facility] - most recent visit in Sept and labs at goal for the secondary hyperpara.   -- Ca+D supplement  -- periodic BMP    Slow Transit Constipation  Both Lindsey and nursing reporting constipation  -- add scheduled Senna to PRN  -- adjust bowel regimen as needed    Physical Deconditioning  In setting of hospitalization and underlying medical conditions  -- ongoing PT/OT      Electronically signed by  Keri Yo MD                          Sincerely,        Keri Yo MD

## 2022-03-08 ENCOUNTER — TRANSITIONAL CARE UNIT VISIT (OUTPATIENT)
Dept: GERIATRICS | Facility: CLINIC | Age: 87
End: 2022-03-08
Payer: MEDICARE

## 2022-03-08 VITALS
HEART RATE: 67 BPM | TEMPERATURE: 98 F | OXYGEN SATURATION: 97 % | SYSTOLIC BLOOD PRESSURE: 124 MMHG | BODY MASS INDEX: 27.77 KG/M2 | HEIGHT: 65 IN | DIASTOLIC BLOOD PRESSURE: 65 MMHG | RESPIRATION RATE: 18 BRPM | WEIGHT: 166.7 LBS

## 2022-03-08 DIAGNOSIS — E03.9 HYPOTHYROIDISM, UNSPECIFIED TYPE: ICD-10-CM

## 2022-03-08 DIAGNOSIS — I12.9 BENIGN HYPERTENSIVE KIDNEY DISEASE WITH CHRONIC KIDNEY DISEASE STAGE I THROUGH STAGE IV, OR UNSPECIFIED: ICD-10-CM

## 2022-03-08 DIAGNOSIS — N25.81 SECONDARY HYPERPARATHYROIDISM OF RENAL ORIGIN (H): ICD-10-CM

## 2022-03-08 DIAGNOSIS — K59.01 SLOW TRANSIT CONSTIPATION: ICD-10-CM

## 2022-03-08 DIAGNOSIS — R10.9 RIGHT FLANK PAIN: Primary | ICD-10-CM

## 2022-03-08 PROCEDURE — 99309 SBSQ NF CARE MODERATE MDM 30: CPT | Performed by: INTERNAL MEDICINE

## 2022-03-08 NOTE — PROGRESS NOTES
[facility] GERIATRICS  TCU Episodic Visit   March 8, 2022      Chief Complaint   Patient presents with     Nursing Home Acute     flank pain, constipation, medication questions       HPI:    Lindsey Bermudez is a 89 year old  (11/6/1931), who is being seen today for an episodic care visit at [facility] TCU where she is doing rehab after a hospitalization with cholecystitis s/p lap michelle (2/16/22).      I met her on 3/3 and she had R flank plain that seemed MSK in etiology and I started some Aspercreme with lidocaine. BIMS 12/15. Retired . She is an assist of 1 with cares     Today, Ms. Mena is seen in her room. The R flank pain is better - the Aspercreme is helping. She had a list for me today. Continues to have constipation. Is passing gas. Discussed options and settled on increasing Senna and adding fiber. No abdominal pain. Slept in her bed last night - had been in chair a couple of nights due to the flank pain. No dyspnea. She takes her thyroid pill at home with breakfast - no milk. Is currently being woke up at 0530 to take it. Discussed we can move it to breakfast and then space out the calcium supplements. No concerns today per discussion with nursing.     ALLERGIES: Adhesive tape-silicones [adhesive tape]    Past Medical, Surgical, Family and Social History reviewed and updated in EPIC.    MEDICATIONS  Current Outpatient Medications   Medication Sig Dispense Refill     ACE/ARB/ARNI NOT PRESCRIBED (INTENTIONAL) Please choose reason not prescribed from choices below.       acetaminophen (TYLENOL) 325 MG tablet Take 2 tablets (650 mg) by mouth every 6 hours as needed for mild pain or other (and adjunct with moderate or severe pain or per patient request)       amLODIPine (NORVASC) 2.5 MG tablet [AMLODIPINE (NORVASC) 2.5 MG TABLET] Take 1 tablet (2.5 mg total) by mouth daily. 90 tablet 3     aspirin 81 MG EC tablet [ASPIRIN 81 MG EC TABLET] Take 81 mg by mouth daily.        bisacodyl (DULCOLAX) 10 MG suppository Place 1 suppository (10 mg) rectally daily as needed for constipation (Use if Magnesium hydroxide (MILK of MAGNESIA) not effective after 24 hours. May discontinue if patient having bowel movement.)       calcium citrate-vitamin D (CITRACAL+D) 315-200 mg-unit per tablet [CALCIUM CITRATE-VITAMIN D (CITRACAL+D) 315-200 MG-UNIT PER TABLET] Take 1 tablet by mouth 2 (two) times a day.       cyanocobalamin 1000 MCG tablet [CYANOCOBALAMIN 1000 MCG TABLET] Take 1 tablet (1,000 mcg total) by mouth daily.  0     folic acid (FOLVITE) 1 MG tablet [FOLIC ACID (FOLVITE) 1 MG TABLET] Take 1 tablet (1 mg total) by mouth daily. 30 tablet 0     levothyroxine (SYNTHROID, LEVOTHROID) 100 MCG tablet [LEVOTHYROXINE (SYNTHROID, LEVOTHROID) 100 MCG TABLET] TAKE 1 TABLET(100 MCG) BY MOUTH DAILY 90 tablet 2     lidocaine (ASPERCREME LIDOCAINE) 4 % external cream Apply topically 2 times daily And BID for back/side pain       melatonin 1 MG TABS tablet Take 1 tablet (1 mg) by mouth nightly as needed for sleep       pantoprazole (PROTONIX) 40 MG EC tablet Take 1 tablet (40 mg) by mouth every morning (before breakfast)       senna-docusate (SENOKOT-S/PERICOLACE) 8.6-50 MG tablet Take 1 tablet by mouth 2 times daily as needed for constipation (Patient taking differently: Take 1 tablet by mouth daily And 1 tab BID PRN)       simvastatin (ZOCOR) 20 MG tablet Take 1 tablet (20 mg) by mouth At Bedtime 90 tablet 1     Medications reviewed:  Medications reconciled to facility chart and changes were made to reflect current medications as identified as above med list. Below are the changes that were made:   Medications stopped since last EPIC medication reconciliation:   There are no discontinued medications.  Medications started since last [facility] medication reconciliation:  No orders of the defined types were placed in this encounter.      REVIEW OF SYSTEMS:  4 point ROS neg other than the symptoms noted above in  "the HPI. [facility] 12/15    PHYSICAL EXAM:  /65   Pulse 67   Temp 98  F (36.7  C)   Resp 18   Ht 1.651 m (5' 5\")   Wt 75.6 kg (166 lb 11.2 oz)   SpO2 97%   BMI 27.74 kg/m    Gen: sitting in recliner, alert, cooperative and in no acute distress  Resp: breathing non labored, no tachypnea   GI: abdomen soft, not-tender, +BS  Ext: mild dependent LE edema  Neuro: CX II-XII grossly in tact; ROM in all four extremities grossly in tact  Psych: alert and oriented to self and general situation    LABS & IMAGING  Recent Labs and Imaging:  Reviewed as per Epic    ASSESSMENT/PLAN:    Right Side/Flank Pain, Improving  Suspect this is MSK from the fall and is helped with topical analgesia.  -- aspercreme + lido BID and BID PRN to R flank/side    Slow Transit Constipation  Ongoing. Belly soft and + bowel sounds  -- increase Senna-S to 1 tab BID scheduled and continue 1 tab BID PRN  -- add daily fiber supplement  -- adjust bowel regimen as needed     HTN  SBPs 120s-130s  -- amlodipine 2.5 mg daily   -- follow BPs and adjust medications as needed    Hypothyroidisim  TSH 3.01 in September  -- OK to take levothyroxine 100 mcg with breakfast; no dairy    CKD, Stage III  Secondary Hyperparathyroid  Baseline Cr around 1.6. She follows with [facility] - most recent visit in Sept and labs at goal for the secondary hyperpara.   -- Ca+D supplement   -- to be given lung and dinner given levothyroxine with    breakfast   -- periodic BMP        Electronically signed by  Keri Yo MD                    "

## 2022-03-15 ENCOUNTER — TRANSITIONAL CARE UNIT VISIT (OUTPATIENT)
Dept: GERIATRICS | Facility: CLINIC | Age: 87
End: 2022-03-15
Payer: MEDICARE

## 2022-03-15 VITALS
WEIGHT: 166.7 LBS | BODY MASS INDEX: 27.77 KG/M2 | HEART RATE: 66 BPM | DIASTOLIC BLOOD PRESSURE: 77 MMHG | OXYGEN SATURATION: 96 % | TEMPERATURE: 97.3 F | RESPIRATION RATE: 18 BRPM | HEIGHT: 65 IN | SYSTOLIC BLOOD PRESSURE: 115 MMHG

## 2022-03-15 DIAGNOSIS — M25.50 POLYARTHRALGIA: ICD-10-CM

## 2022-03-15 DIAGNOSIS — I10 ESSENTIAL HYPERTENSION: ICD-10-CM

## 2022-03-15 DIAGNOSIS — K80.20 CALCULUS OF GALLBLADDER WITHOUT CHOLECYSTITIS WITHOUT OBSTRUCTION: Primary | ICD-10-CM

## 2022-03-15 DIAGNOSIS — N18.4 CKD (CHRONIC KIDNEY DISEASE) STAGE 4, GFR 15-29 ML/MIN (H): ICD-10-CM

## 2022-03-15 PROCEDURE — 99316 NF DSCHRG MGMT 30 MIN+: CPT | Performed by: NURSE PRACTITIONER

## 2022-03-15 NOTE — LETTER
"    3/11/2022        RE: Lindsey Bermudez  [address]  [facility] [address]        [facility] SERVICES DISCHARGE SUMMARY    PATIENT'S NAME:  Lindsey Bermudez  MEDICAL RECORD NUMBER:  [id number]  YOB: 1931    PRIMARY CARE PROVIDER AND CLINIC RESPONSIBLE AFTER TRANSFER:  No Ref-Primary, Physician, No address on file      CODE STATUS/ADVANCE DIRECTIVES DISCUSSION:   CPR/Full code      Allergies   Allergen Reactions     Adhesive Tape-Silicones [Adhesive Tape] Unknown     Blisters with some bandage like products       TRANSFERRING PROVIDERS: Ap Higgins CNP, Keri Yo MD    DATE OF SNF ADMISSION: [facility]. Hospital stay 2/15/22 through 2/21/22..   HPI: Lindsey has a history of hypertension, CKD and had a recent fall injuring her right side where she presented to the hospital and was admitted with acute cholecystitis.  She did have a lap michelle and is now off antibiotics.   Pelvic and right hip x-rays were negative.  She is on only Tylenol for pain management.     DATE OF SNF DISCHARGE (including anticipating DC):  3/19/22     SNF FACILITY: [facility]    Condition on Discharge:  Improving.    Function, Cognitive Scores and Equipment:  BIMS 12/15, ambulating with walker.     DISCHARGE DIAGNOSIS:   1. Calculus of gallbladder without cholecystitis without obstruction    2. CKD (chronic kidney disease) stage 4, GFR 15-29 ml/min (H)    3. Essential hypertension    4. Polyarthralgia        [facility] Course:     K80.20) Calculus of gallbladder without cholecystitis without obstruction  (primary encounter diagnosis)  Comment: Laparoscopic incisions healing well, some bruising throughout abdomen, no redness,  tenderness or drainage. She did have some concern for a \"lump\" under her lap in the RLQ incision, it is pea sized and decreasing in size. Provided reassurance that this is typical healing tissue; no concerns for infection.        (I10) Essential " hypertension  Comment: Generally less than goal of 140/90.  Plan:   -Amlodipine 2.5 daily.     (M25.50) Polyarthralgia  Plan:   -Tylenol as needed.     (N18.4) CKD (chronic kidney disease) stage 4, GFR 15-29 ml/min (H)  Comment:   Last Comprehensive Metabolic Panel:  Sodium   Date Value Ref Range Status   02/25/2022 139 136 - 145 mmol/L Final     Potassium   Date Value Ref Range Status   02/25/2022 4.3 3.5 - 5.0 mmol/L Final     Chloride   Date Value Ref Range Status   02/25/2022 106 98 - 107 mmol/L Final     Carbon Dioxide (CO2)   Date Value Ref Range Status   02/25/2022 21 (L) 22 - 31 mmol/L Final     Anion Gap   Date Value Ref Range Status   02/25/2022 12 5 - 18 mmol/L Final     Glucose   Date Value Ref Range Status   02/25/2022 87 70 - 125 mg/dL Final     Urea Nitrogen   Date Value Ref Range Status   02/25/2022 32 (H) 8 - 28 mg/dL Final     Creatinine   Date Value Ref Range Status   02/25/2022 1.65 (H) 0.60 - 1.10 mg/dL Final     GFR Estimate   Date Value Ref Range Status   02/25/2022 29 (L) >60 mL/min/1.73m2 Final     Comment:     Effective December 21, 2021 eGFRcr in adults is calculated using the 2021 CKD-EPI creatinine equation which includes age and gender (Carlee et al., NEJ, DOI: 10.1056/RWFVnq9480071)   10/27/2020 26 (L) >60 mL/min/1.73m2 Final     Calcium   Date Value Ref Range Status   02/25/2022 8.9 8.5 - 10.5 mg/dL Final     Bilirubin Total   Date Value Ref Range Status   02/17/2022 0.5 0.0 - 1.0 mg/dL Final     Alkaline Phosphatase   Date Value Ref Range Status   02/17/2022 135 (H) 45 - 120 U/L Final     ALT   Date Value Ref Range Status   02/17/2022 71 (H) 0 - 45 U/L Final     AST   Date Value Ref Range Status   02/17/2022 66 (H) 0 - 40 U/L Final       PAST MEDICAL HISTORY:  Past Medical History:   Diagnosis Date     Anemia, unspecified     Created by Conversion      CKD (chronic kidney disease) stage 4, GFR 15-29 ml/min (H) 12/11/2015     Disease of thyroid gland     hypothyroidism      Diverticulitis of colon     Created by Conversion  Replacement Utility updated for latest IMO load     Eczema      Hydronephrosis      Hyperlipidemia      Hypertension      Hypothyroidism     Created by Conversion  Replacement Utility updated for latest IMO load     Osteoarthritis     Created by Conversion  Replacement Utility updated for latest IMO load     Skin cancer      Ureteral stone        DISCHARGE MEDICATIONS:  Current Outpatient Medications   Medication Sig Dispense Refill     ACE/ARB/ARNI NOT PRESCRIBED (INTENTIONAL) Please choose reason not prescribed from choices below.       acetaminophen (TYLENOL) 325 MG tablet Take 2 tablets (650 mg) by mouth every 6 hours as needed for mild pain or other (and adjunct with moderate or severe pain or per patient request)       amLODIPine (NORVASC) 2.5 MG tablet [AMLODIPINE (NORVASC) 2.5 MG TABLET] Take 1 tablet (2.5 mg total) by mouth daily. 90 tablet 3     aspirin 81 MG EC tablet [ASPIRIN 81 MG EC TABLET] Take 81 mg by mouth daily.       bisacodyl (DULCOLAX) 10 MG suppository Place 1 suppository (10 mg) rectally daily as needed for constipation (Use if Magnesium hydroxide (MILK of MAGNESIA) not effective after 24 hours. May discontinue if patient having bowel movement.)       calcium citrate-vitamin D (CITRACAL+D) 315-200 mg-unit per tablet Take 1 tablet by mouth 2 times daily With lunch and dinner       cyanocobalamin 1000 MCG tablet [CYANOCOBALAMIN 1000 MCG TABLET] Take 1 tablet (1,000 mcg total) by mouth daily.  0     folic acid (FOLVITE) 1 MG tablet [FOLIC ACID (FOLVITE) 1 MG TABLET] Take 1 tablet (1 mg total) by mouth daily. 30 tablet 0     levothyroxine (SYNTHROID, LEVOTHROID) 100 MCG tablet [LEVOTHYROXINE (SYNTHROID, LEVOTHROID) 100 MCG TABLET] TAKE 1 TABLET(100 MCG) BY MOUTH DAILY (Patient taking differently: Take 100 mcg by mouth daily OK to give with breakfast - no dairy. Ca pill should be given at lunch and dinner.) 90 tablet 2     lidocaine (ASPERCREME  "LIDOCAINE) 4 % external cream Apply topically 2 times daily And BID for back/side pain       melatonin 1 MG TABS tablet Take 1 tablet (1 mg) by mouth nightly as needed for sleep       pantoprazole (PROTONIX) 40 MG EC tablet Take 1 tablet (40 mg) by mouth every morning (before breakfast)       psyllium (METAMUCIL) 28.3 % packet Take 1 packet by mouth daily       senna-docusate (SENOKOT-S/PERICOLACE) 8.6-50 MG tablet Take 1 tablet by mouth 2 times daily as needed for constipation (Patient taking differently: Take 1 tablet by mouth 2 times daily And 1 tab BID PRN)       simvastatin (ZOCOR) 20 MG tablet Take 1 tablet (20 mg) by mouth At Bedtime 90 tablet 1       MEDICATION CHANGES/RATIONALE:   Aspercream for msk pain.   Senna S for constipation    ROS:    4 point ROS including Respiratory, CV, GI and , other than that noted in the HPI,  is negative    Physical Exam:   Vitals: /77   Pulse 66   Temp 97.3  F (36.3  C)   Resp 18   Ht 1.651 m (5' 5\")   Wt 75.6 kg (166 lb 11.2 oz)   SpO2 96%   BMI 27.74 kg/m    BMI= Body mass index is 27.74 kg/m .    Physical Exam   General appearance: alert, appears stated age and cooperative.   Head: Normocephalic, without obvious abnormality, atraumatic, Eyes: sclera anicteric.  Lungs: respirations without effort, LSCTA; no wheezing or rales.   Cardiovascular: S1, S2. Regular rate and rhythm.   ABDOMEN: Globular and soft, non tender.    Extremities: extremities normal, atraumatic, no cyanosis or edema  Skin: Skin color, texture, turgor normal. No rashes or lesions  Neurologic:oriented. No focal deficits.   Psych: interacts well with caregivers, exhibits logical thought processes and connections, pleasant    DISCHARGE PLAN:  Occupational Therapy and Physical Therapy Follow-up: PRN.  Pending labs:  None.    DISCHARGE TREATMENTS:  (ostomy, wounds, TF):  none    SNF COURSE:  SNF labs   Lab Results   Component Value Date    WBC 5.3 02/25/2022     Lab Results   Component Value " Date    RBC 3.14 02/25/2022     Lab Results   Component Value Date    HGB 9.3 02/25/2022     Lab Results   Component Value Date    HCT 30.3 02/25/2022     No components found for: MCT  Lab Results   Component Value Date    MCV 97 02/25/2022     Lab Results   Component Value Date    MCH 29.6 02/25/2022     Lab Results   Component Value Date    MCHC 30.7 02/25/2022     Lab Results   Component Value Date    RDW 12.3 02/25/2022     Lab Results   Component Value Date     02/25/2022     Documentation of Face to Face and Certification for Home Health Services    I certify that patient: Lindsey is under my care and that I, or a nurse practitioner or physician's assistant working with me, had a face-to-face encounter that meets the physician face-to-face encounter requirements with this patient on: 3/15/22.    This encounter with the patient was in whole, or in part, for the following medical condition, which is the primary reason for home health care: Lap michelle with weakness and deconditioning.     I certify that, based on my findings, the following services are medically necessary home health services: Nursing, Occupational Therapy and Physical Therapy.    My clinical findings support the need for the above services because: RN required for medication management, PT and OT required for continued functional improvment in home setting .     Further, I certify that my clinical findings support that this patient is homebound (i.e. absences from home require considerable and taxing effort and are for medical reasons or Mandaen services or infrequently or of short duration when for other reasons) because: Requires assistance of another person or specialized equipment to access medical services because patient: Requires supervision of another for safe transfer...    Based on the above findings. I certify that this patient is confined to the home and needs intermittent skilled nursing care, physical therapy and/or speech  therapy.  The patient is under my care, and I have initiated the establishment of the plan of care.  This patient will be followed by a physician who will periodically review the plan of care.        TOTAL DISCHARGE TIME:   Greater than 30 minutes    Ap Higgins CNP          Sincerely,        Ap Higgins CNP

## 2022-03-17 NOTE — PROGRESS NOTES
"[facility] GERIATRIC SERVICES DISCHARGE SUMMARY    PATIENT'S NAME:  Lindsey Bermudez  MEDICAL RECORD NUMBER:  [id number]  YOB: 1931    PRIMARY CARE PROVIDER AND CLINIC RESPONSIBLE AFTER TRANSFER:  No Ref-Primary, Physician, No address on file      CODE STATUS/ADVANCE DIRECTIVES DISCUSSION:   CPR/Full code      Allergies   Allergen Reactions     Adhesive Tape-Silicones [Adhesive Tape] Unknown     Blisters with some bandage like products       TRANSFERRING PROVIDERS: Ap Higgins CNP, Keri Yo MD    DATE OF SNF ADMISSION: [facility]. Hospital stay 2/15/22 through 2/21/22..   HPI: Lindsey has a history of hypertension, CKD and had a recent fall injuring her right side where she presented to the hospital and was admitted with acute cholecystitis.  She did have a lap michelle and is now off antibiotics.   Pelvic and right hip x-rays were negative.  She is on only Tylenol for pain management.     DATE OF SNF DISCHARGE (including anticipating DC):  3/19/22     SNF FACILITY: [facility]    Condition on Discharge:  Improving.    Function, Cognitive Scores and Equipment:  BIMS 12/15, ambulating with walker.     DISCHARGE DIAGNOSIS:   1. Calculus of gallbladder without cholecystitis without obstruction    2. CKD (chronic kidney disease) stage 4, GFR 15-29 ml/min (H)    3. Essential hypertension    4. Polyarthralgia        HPI Nursing Facility Course:     K80.20) Calculus of gallbladder without cholecystitis without obstruction  (primary encounter diagnosis)  Comment: Laparoscopic incisions healing well, some bruising throughout abdomen, no redness,  tenderness or drainage. She did have some concern for a \"lump\" under her lap in the RLQ incision, it is pea sized and decreasing in size. Provided reassurance that this is typical healing tissue; no concerns for infection.        (I10) Essential hypertension  Comment: Generally less than goal of 140/90.  Plan:   -Amlodipine 2.5 " daily.     (M25.50) Polyarthralgia  Plan:   -Tylenol as needed.     (N18.4) CKD (chronic kidney disease) stage 4, GFR 15-29 ml/min (H)  Comment:   Last Comprehensive Metabolic Panel:  Sodium   Date Value Ref Range Status   02/25/2022 139 136 - 145 mmol/L Final     Potassium   Date Value Ref Range Status   02/25/2022 4.3 3.5 - 5.0 mmol/L Final     Chloride   Date Value Ref Range Status   02/25/2022 106 98 - 107 mmol/L Final     Carbon Dioxide (CO2)   Date Value Ref Range Status   02/25/2022 21 (L) 22 - 31 mmol/L Final     Anion Gap   Date Value Ref Range Status   02/25/2022 12 5 - 18 mmol/L Final     Glucose   Date Value Ref Range Status   02/25/2022 87 70 - 125 mg/dL Final     Urea Nitrogen   Date Value Ref Range Status   02/25/2022 32 (H) 8 - 28 mg/dL Final     Creatinine   Date Value Ref Range Status   02/25/2022 1.65 (H) 0.60 - 1.10 mg/dL Final     GFR Estimate   Date Value Ref Range Status   02/25/2022 29 (L) >60 mL/min/1.73m2 Final     Comment:     Effective December 21, 2021 eGFRcr in adults is calculated using the 2021 CKD-EPI creatinine equation which includes age and gender (Carlee et al., NEJM, DOI: 10.1056/PNVPqn7235694)   10/27/2020 26 (L) >60 mL/min/1.73m2 Final     Calcium   Date Value Ref Range Status   02/25/2022 8.9 8.5 - 10.5 mg/dL Final     Bilirubin Total   Date Value Ref Range Status   02/17/2022 0.5 0.0 - 1.0 mg/dL Final     Alkaline Phosphatase   Date Value Ref Range Status   02/17/2022 135 (H) 45 - 120 U/L Final     ALT   Date Value Ref Range Status   02/17/2022 71 (H) 0 - 45 U/L Final     AST   Date Value Ref Range Status   02/17/2022 66 (H) 0 - 40 U/L Final       PAST MEDICAL HISTORY:  Past Medical History:   Diagnosis Date     Anemia, unspecified     Created by Conversion      CKD (chronic kidney disease) stage 4, GFR 15-29 ml/min (H) 12/11/2015     Disease of thyroid gland     hypothyroidism     Diverticulitis of colon     Created by Conversion  Replacement Utility updated for latest IMO  load     Eczema      Hydronephrosis      Hyperlipidemia      Hypertension      Hypothyroidism     Created by Conversion  Replacement Utility updated for latest IMO load     Osteoarthritis     Created by Conversion  Replacement Utility updated for latest IMO load     Skin cancer      Ureteral stone        DISCHARGE MEDICATIONS:  Current Outpatient Medications   Medication Sig Dispense Refill     ACE/ARB/ARNI NOT PRESCRIBED (INTENTIONAL) Please choose reason not prescribed from choices below.       acetaminophen (TYLENOL) 325 MG tablet Take 2 tablets (650 mg) by mouth every 6 hours as needed for mild pain or other (and adjunct with moderate or severe pain or per patient request)       amLODIPine (NORVASC) 2.5 MG tablet [AMLODIPINE (NORVASC) 2.5 MG TABLET] Take 1 tablet (2.5 mg total) by mouth daily. 90 tablet 3     aspirin 81 MG EC tablet [ASPIRIN 81 MG EC TABLET] Take 81 mg by mouth daily.       bisacodyl (DULCOLAX) 10 MG suppository Place 1 suppository (10 mg) rectally daily as needed for constipation (Use if Magnesium hydroxide (MILK of MAGNESIA) not effective after 24 hours. May discontinue if patient having bowel movement.)       calcium citrate-vitamin D (CITRACAL+D) 315-200 mg-unit per tablet Take 1 tablet by mouth 2 times daily With lunch and dinner       cyanocobalamin 1000 MCG tablet [CYANOCOBALAMIN 1000 MCG TABLET] Take 1 tablet (1,000 mcg total) by mouth daily.  0     folic acid (FOLVITE) 1 MG tablet [FOLIC ACID (FOLVITE) 1 MG TABLET] Take 1 tablet (1 mg total) by mouth daily. 30 tablet 0     levothyroxine (SYNTHROID, LEVOTHROID) 100 MCG tablet [LEVOTHYROXINE (SYNTHROID, LEVOTHROID) 100 MCG TABLET] TAKE 1 TABLET(100 MCG) BY MOUTH DAILY (Patient taking differently: Take 100 mcg by mouth daily OK to give with breakfast - no dairy. Ca pill should be given at lunch and dinner.) 90 tablet 2     lidocaine (ASPERCREME LIDOCAINE) 4 % external cream Apply topically 2 times daily And BID for back/side pain        "melatonin 1 MG TABS tablet Take 1 tablet (1 mg) by mouth nightly as needed for sleep       pantoprazole (PROTONIX) 40 MG EC tablet Take 1 tablet (40 mg) by mouth every morning (before breakfast)       psyllium (METAMUCIL) 28.3 % packet Take 1 packet by mouth daily       senna-docusate (SENOKOT-S/PERICOLACE) 8.6-50 MG tablet Take 1 tablet by mouth 2 times daily as needed for constipation (Patient taking differently: Take 1 tablet by mouth 2 times daily And 1 tab BID PRN)       simvastatin (ZOCOR) 20 MG tablet Take 1 tablet (20 mg) by mouth At Bedtime 90 tablet 1       MEDICATION CHANGES/RATIONALE:   Aspercream for msk pain.   Senna S for constipation    ROS:    4 point ROS including Respiratory, CV, GI and , other than that noted in the HPI,  is negative    Physical Exam:   Vitals: /77   Pulse 66   Temp 97.3  F (36.3  C)   Resp 18   Ht 1.651 m (5' 5\")   Wt 75.6 kg (166 lb 11.2 oz)   SpO2 96%   BMI 27.74 kg/m    BMI= Body mass index is 27.74 kg/m .    Physical Exam   General appearance: alert, appears stated age and cooperative.   Head: Normocephalic, without obvious abnormality, atraumatic, Eyes: sclera anicteric.  Lungs: respirations without effort, LSCTA; no wheezing or rales.   Cardiovascular: S1, S2. Regular rate and rhythm.   ABDOMEN: Globular and soft, non tender.    Extremities: extremities normal, atraumatic, no cyanosis or edema  Skin: Skin color, texture, turgor normal. No rashes or lesions  Neurologic:oriented. No focal deficits.   Psych: interacts well with caregivers, exhibits logical thought processes and connections, pleasant    DISCHARGE PLAN:  Occupational Therapy and Physical Therapy Follow-up: PRN.  Pending labs:  None.    DISCHARGE TREATMENTS:  (ostomy, wounds, TF):  none    SNF COURSE:  SNF labs   Lab Results   Component Value Date    WBC 5.3 02/25/2022     Lab Results   Component Value Date    RBC 3.14 02/25/2022     Lab Results   Component Value Date    HGB 9.3 02/25/2022     Lab " Results   Component Value Date    HCT 30.3 02/25/2022     No components found for: MCT  Lab Results   Component Value Date    MCV 97 02/25/2022     Lab Results   Component Value Date    MCH 29.6 02/25/2022     Lab Results   Component Value Date    MCHC 30.7 02/25/2022     Lab Results   Component Value Date    RDW 12.3 02/25/2022     Lab Results   Component Value Date     02/25/2022     Documentation of Face to Face and Certification for Home Health Services    I certify that patient: Lindsey is under my care and that I, or a nurse practitioner or physician's assistant working with me, had a face-to-face encounter that meets the physician face-to-face encounter requirements with this patient on: 3/15/22.    This encounter with the patient was in whole, or in part, for the following medical condition, which is the primary reason for home health care: Lap michelle with weakness and deconditioning.     I certify that, based on my findings, the following services are medically necessary home health services: Nursing, Occupational Therapy and Physical Therapy.    My clinical findings support the need for the above services because: RN required for medication management, PT and OT required for continued functional improvment in home setting .     Further, I certify that my clinical findings support that this patient is homebound (i.e. absences from home require considerable and taxing effort and are for medical reasons or Rastafarian services or infrequently or of short duration when for other reasons) because: Requires assistance of another person or specialized equipment to access medical services because patient: Requires supervision of another for safe transfer...    Based on the above findings. I certify that this patient is confined to the home and needs intermittent skilled nursing care, physical therapy and/or speech therapy.  The patient is under my care, and I have initiated the establishment of the plan of  care.  This patient will be followed by a physician who will periodically review the plan of care.        TOTAL DISCHARGE TIME:   Greater than 30 minutes    Ap Higgins CNP

## 2022-03-18 DIAGNOSIS — I10 ESSENTIAL HYPERTENSION: ICD-10-CM

## 2022-03-18 DIAGNOSIS — K21.00 GASTROESOPHAGEAL REFLUX DISEASE WITH ESOPHAGITIS WITHOUT HEMORRHAGE: ICD-10-CM

## 2022-03-21 ENCOUNTER — MEDICAL CORRESPONDENCE (OUTPATIENT)
Dept: HEALTH INFORMATION MANAGEMENT | Facility: CLINIC | Age: 87
End: 2022-03-21
Payer: MEDICARE

## 2022-03-21 RX ORDER — PANTOPRAZOLE SODIUM 40 MG/1
40 TABLET, DELAYED RELEASE ORAL
Qty: 90 TABLET | Refills: 0 | OUTPATIENT
Start: 2022-03-21

## 2022-03-21 RX ORDER — AMLODIPINE BESYLATE 2.5 MG/1
2.5 TABLET ORAL DAILY
Qty: 90 TABLET | Refills: 0 | OUTPATIENT
Start: 2022-03-21

## 2022-03-23 ENCOUNTER — TELEPHONE (OUTPATIENT)
Dept: INTERNAL MEDICINE | Facility: CLINIC | Age: 87
End: 2022-03-23
Payer: MEDICARE

## 2022-03-23 NOTE — TELEPHONE ENCOUNTER
Reason for Call: Request for an order or referral:    Order or referral being requested: VERBAL ORDERS FOR 8 ADDITIONAL PT VISITS WALKING BALANCE AND STRENTH    Date needed: as soon as possible    Has the patient been seen by the PCP for this problem? YES    Additional comments: NONE    Phone number Patient can be reached at:  Other phone number:  [phone] RUDY    Best Time:  ANYTIME    Can we leave a detailed message on this number?  YES    Call taken on 3/23/2022 at 10:41 AM by Cici Roman

## 2022-04-22 DIAGNOSIS — Z53.9 DIAGNOSIS NOT YET DEFINED: Primary | ICD-10-CM

## 2022-04-22 PROCEDURE — 99207 PR MD CERTIFICATION HHA PATIENT: CPT | Performed by: PEDIATRICS

## 2022-04-22 PROCEDURE — G0180 MD CERTIFICATION HHA PATIENT: HCPCS | Performed by: PEDIATRICS

## 2022-04-26 ENCOUNTER — TELEPHONE (OUTPATIENT)
Dept: INTERNAL MEDICINE | Facility: CLINIC | Age: 87
End: 2022-04-26
Payer: MEDICARE

## 2022-04-26 NOTE — TELEPHONE ENCOUNTER
Reason for Call:  Home Health Care    Florentin with [facility]  called regarding (reason for call): PT Verbal Orders    Orders are needed for this patient.   PT Extending Care    PT: 2 additional PT session  1 visit per week for 2 weeks, balance and walking training.     Phone Number Homecare Nurse can be reached at:  [phone] per Florentin okay to leave a message voice mail is secure and confidential.       Call taken on 4/26/2022 at 12:31 PM by Inessa Pitts

## 2022-04-27 NOTE — TELEPHONE ENCOUNTER
Per homecare protocol, roman okay called to Florentin to continue homecare PT for 2 additional visits.

## 2022-04-28 ENCOUNTER — TELEPHONE (OUTPATIENT)
Dept: INTERNAL MEDICINE | Facility: CLINIC | Age: 87
End: 2022-04-28
Payer: MEDICARE

## 2022-04-28 NOTE — TELEPHONE ENCOUNTER
Eboni calling from [facility] to request the following:     Medicare approved diagnosis for her home care services of physical therapy.  Diagnosis in chart for Osteoarthritis with code M19.90 is insufficient.  Needs to be more specific to the effected area of the body.    Any questions, please call Eboni at [phone]

## 2022-04-30 NOTE — TELEPHONE ENCOUNTER
Please tell them I do not know what particular area she is being bothered by    These are home care PT orders because patient was recently hospitalized    Patient hasn't seen me since leaving hospital. You could try calling her and asking her what PT is working on if that'll work? I'm not really sure how this works because this is for post hospitalization PT Orders, not something I ordered    Or try to clarify with the home care team      Thanks  Dr. Antoine

## 2022-05-02 NOTE — TELEPHONE ENCOUNTER
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    Eboni will use that as diagnosis  & call back if any further needs

## 2022-05-05 DIAGNOSIS — I10 ESSENTIAL HYPERTENSION: ICD-10-CM

## 2022-05-05 DIAGNOSIS — E03.9 HYPOTHYROIDISM: ICD-10-CM

## 2022-05-05 DIAGNOSIS — K21.00 GASTROESOPHAGEAL REFLUX DISEASE WITH ESOPHAGITIS WITHOUT HEMORRHAGE: ICD-10-CM

## 2022-05-06 ENCOUNTER — MEDICAL CORRESPONDENCE (OUTPATIENT)
Dept: HEALTH INFORMATION MANAGEMENT | Facility: CLINIC | Age: 87
End: 2022-05-06
Payer: MEDICARE

## 2022-05-06 DIAGNOSIS — Z53.9 DIAGNOSIS NOT YET DEFINED: Primary | ICD-10-CM

## 2022-05-06 PROCEDURE — G0180 MD CERTIFICATION HHA PATIENT: HCPCS | Performed by: PEDIATRICS

## 2022-05-07 DIAGNOSIS — E03.9 HYPOTHYROIDISM: ICD-10-CM

## 2022-05-07 DIAGNOSIS — I10 ESSENTIAL HYPERTENSION: ICD-10-CM

## 2022-05-07 DIAGNOSIS — K21.00 GASTROESOPHAGEAL REFLUX DISEASE WITH ESOPHAGITIS WITHOUT HEMORRHAGE: ICD-10-CM

## 2022-05-07 RX ORDER — PANTOPRAZOLE SODIUM 40 MG/1
TABLET, DELAYED RELEASE ORAL
Qty: 90 TABLET | Status: CANCELLED | OUTPATIENT
Start: 2022-05-07

## 2022-05-09 RX ORDER — AMLODIPINE BESYLATE 2.5 MG/1
TABLET ORAL
Qty: 90 TABLET | Refills: 3 | Status: SHIPPED | OUTPATIENT
Start: 2022-05-09 | End: 2023-06-07

## 2022-05-09 RX ORDER — PANTOPRAZOLE SODIUM 40 MG/1
TABLET, DELAYED RELEASE ORAL
Qty: 90 TABLET | Refills: 0 | Status: SHIPPED | OUTPATIENT
Start: 2022-05-09 | End: 2022-06-22

## 2022-05-09 RX ORDER — LEVOTHYROXINE SODIUM 100 UG/1
TABLET ORAL
Qty: 30 TABLET | Refills: 0 | Status: SHIPPED | OUTPATIENT
Start: 2022-05-09 | End: 2022-06-16

## 2022-05-10 RX ORDER — PANTOPRAZOLE SODIUM 40 MG/1
TABLET, DELAYED RELEASE ORAL
Qty: 90 TABLET | OUTPATIENT
Start: 2022-05-10

## 2022-05-10 RX ORDER — LEVOTHYROXINE SODIUM 100 UG/1
TABLET ORAL
Qty: 90 TABLET | Refills: 2 | OUTPATIENT
Start: 2022-05-10

## 2022-05-10 RX ORDER — AMLODIPINE BESYLATE 2.5 MG/1
TABLET ORAL
Qty: 90 TABLET | Refills: 3 | OUTPATIENT
Start: 2022-05-10

## 2022-05-17 ENCOUNTER — MEDICAL CORRESPONDENCE (OUTPATIENT)
Dept: HEALTH INFORMATION MANAGEMENT | Facility: CLINIC | Age: 87
End: 2022-05-17
Payer: MEDICARE

## 2022-06-15 DIAGNOSIS — E03.9 HYPOTHYROIDISM: ICD-10-CM

## 2022-06-16 RX ORDER — LEVOTHYROXINE SODIUM 100 UG/1
TABLET ORAL
Qty: 90 TABLET | Refills: 0 | Status: SHIPPED | OUTPATIENT
Start: 2022-06-16 | End: 2022-10-12

## 2022-06-16 NOTE — TELEPHONE ENCOUNTER
"Last Written Prescription Date:  5/9/22  Last Fill Quantity: 30,  # refills: 0   Last office visit provider:  9/21/21     Requested Prescriptions   Pending Prescriptions Disp Refills     levothyroxine (SYNTHROID/LEVOTHROID) 100 MCG tablet [Pharmacy Med Name: LEVOTHYROXINE 0.100MG (100MCG) TAB] 90 tablet      Sig: TAKE 1 TABLET BY MOUTH EVERY DAY       Thyroid Protocol Passed - 6/15/2022  9:19 AM        Passed - Patient is 12 years or older        Passed - Recent (12 mo) or future (30 days) visit within the authorizing provider's specialty     Patient has had an office visit with the authorizing provider or a provider within the authorizing providers department within the previous 12 mos or has a future within next 30 days. See \"Patient Info\" tab in inbasket, or \"Choose Columns\" in Meds & Orders section of the refill encounter.              Passed - Medication is active on med list        Passed - Normal TSH on file in past 12 months     Recent Labs   Lab Test 09/21/21  1226   TSH 3.01              Passed - No active pregnancy on record     If patient is pregnant or has had a positive pregnancy test, please check TSH.          Passed - No positive pregnancy test in past 12 months     If patient is pregnant or has had a positive pregnancy test, please check TSH.               Ibeth Mike RN 06/16/22 6:37 AM  "

## 2022-06-22 ENCOUNTER — OFFICE VISIT (OUTPATIENT)
Dept: FAMILY MEDICINE | Facility: CLINIC | Age: 87
End: 2022-06-22
Payer: MEDICARE

## 2022-06-22 VITALS
DIASTOLIC BLOOD PRESSURE: 65 MMHG | TEMPERATURE: 97.9 F | SYSTOLIC BLOOD PRESSURE: 128 MMHG | BODY MASS INDEX: 24.49 KG/M2 | RESPIRATION RATE: 18 BRPM | HEART RATE: 76 BPM | WEIGHT: 147 LBS | OXYGEN SATURATION: 96 % | HEIGHT: 65 IN

## 2022-06-22 DIAGNOSIS — E78.00 PURE HYPERCHOLESTEROLEMIA: ICD-10-CM

## 2022-06-22 DIAGNOSIS — E03.9 PRIMARY HYPOTHYROIDISM: ICD-10-CM

## 2022-06-22 DIAGNOSIS — I10 ESSENTIAL HYPERTENSION: Primary | ICD-10-CM

## 2022-06-22 DIAGNOSIS — K21.00 GASTROESOPHAGEAL REFLUX DISEASE WITH ESOPHAGITIS WITHOUT HEMORRHAGE: ICD-10-CM

## 2022-06-22 LAB
ALBUMIN SERPL-MCNC: 3.8 G/DL (ref 3.5–5)
ALP SERPL-CCNC: 87 U/L (ref 45–120)
ALT SERPL W P-5'-P-CCNC: 11 U/L (ref 0–45)
ANION GAP SERPL CALCULATED.3IONS-SCNC: 11 MMOL/L (ref 5–18)
AST SERPL W P-5'-P-CCNC: 16 U/L (ref 0–40)
BILIRUB SERPL-MCNC: 0.5 MG/DL (ref 0–1)
BUN SERPL-MCNC: 52 MG/DL (ref 8–28)
CALCIUM SERPL-MCNC: 9.3 MG/DL (ref 8.5–10.5)
CHLORIDE BLD-SCNC: 109 MMOL/L (ref 98–107)
CO2 SERPL-SCNC: 23 MMOL/L (ref 22–31)
CREAT SERPL-MCNC: 3.17 MG/DL (ref 0.6–1.1)
CREAT UR-MCNC: 60 MG/DL
ERYTHROCYTE [DISTWIDTH] IN BLOOD BY AUTOMATED COUNT: 12.6 % (ref 10–15)
GFR SERPL CREATININE-BSD FRML MDRD: 13 ML/MIN/1.73M2
GLUCOSE BLD-MCNC: 84 MG/DL (ref 70–125)
HCT VFR BLD AUTO: 33.4 % (ref 35–47)
HGB BLD-MCNC: 10.6 G/DL (ref 11.7–15.7)
MCH RBC QN AUTO: 29.9 PG (ref 26.5–33)
MCHC RBC AUTO-ENTMCNC: 31.7 G/DL (ref 31.5–36.5)
MCV RBC AUTO: 94 FL (ref 78–100)
MICROALBUMIN UR-MCNC: 5.01 MG/DL (ref 0–1.99)
MICROALBUMIN/CREAT UR: 83.5 MG/G CR
PLATELET # BLD AUTO: 192 10E3/UL (ref 150–450)
POTASSIUM BLD-SCNC: 5.2 MMOL/L (ref 3.5–5)
PROT SERPL-MCNC: 6.8 G/DL (ref 6–8)
RBC # BLD AUTO: 3.54 10E6/UL (ref 3.8–5.2)
SODIUM SERPL-SCNC: 143 MMOL/L (ref 136–145)
TSH SERPL DL<=0.005 MIU/L-ACNC: 2.23 UIU/ML (ref 0.3–5)
VIT B12 SERPL-MCNC: 1605 PG/ML (ref 213–816)
WBC # BLD AUTO: 4.3 10E3/UL (ref 4–11)

## 2022-06-22 PROCEDURE — 99214 OFFICE O/P EST MOD 30 MIN: CPT | Mod: 25 | Performed by: FAMILY MEDICINE

## 2022-06-22 PROCEDURE — 80053 COMPREHEN METABOLIC PANEL: CPT | Performed by: FAMILY MEDICINE

## 2022-06-22 PROCEDURE — 82607 VITAMIN B-12: CPT | Performed by: FAMILY MEDICINE

## 2022-06-22 PROCEDURE — 91305 COVID-19,PF,PFIZER (12+ YRS): CPT | Performed by: FAMILY MEDICINE

## 2022-06-22 PROCEDURE — 82043 UR ALBUMIN QUANTITATIVE: CPT | Performed by: FAMILY MEDICINE

## 2022-06-22 PROCEDURE — 0054A COVID-19,PF,PFIZER (12+ YRS): CPT | Performed by: FAMILY MEDICINE

## 2022-06-22 PROCEDURE — 85027 COMPLETE CBC AUTOMATED: CPT | Performed by: FAMILY MEDICINE

## 2022-06-22 PROCEDURE — 36415 COLL VENOUS BLD VENIPUNCTURE: CPT | Performed by: FAMILY MEDICINE

## 2022-06-22 PROCEDURE — 84443 ASSAY THYROID STIM HORMONE: CPT | Performed by: FAMILY MEDICINE

## 2022-06-22 ASSESSMENT — PAIN SCALES - GENERAL: PAINLEVEL: NO PAIN (0)

## 2022-06-22 NOTE — PROGRESS NOTES
"  Assessment & Plan     (I10) Essential hypertension  (primary encounter diagnosis)  Comment: may hold off amlodipine and monitor BP.  may discontinue if BP remains within the normotensive range   Plan: Albumin Random Urine Quantitative with Creat         Ratio, Comprehensive metabolic panel (BMP +         Alb, Alk Phos, ALT, AST, Total. Bili, TP), CBC         with platelets, Vitamin B12          Elevated BUN and creatinine  History of CRF/ CKD  Follow up with nephrology     (K21.00) Gastroesophageal reflux disease with esophagitis without hemorrhage  Comment: no longer the issue   Plan: May discontinue Pantoprazole       (E78.00) Pure hypercholesterolemia  Comment: given her age and no history of of CVD/cardiomyopathy or stroke, may discontinue statin therapy at this time    (E03.9) Primary hypothyroidism  Comment: On levothyroxine 100 mcg  Plan: Recheck TSH        Normal                    No follow-ups on file.    Torey Nichols MD  [facility]    Ayla Portillo is a 89 year old presenting for healthcare establishment  She is questioning her current care, and prescribed meds, whether most of what is prescribed still needed.  She would like to shorten the med list as much possible.     She has been taking the pantoprazole for a while, not sure even what it's for as she no longer has the issues with GERD.       Review of Systems         Objective    /65   Pulse 76   Temp 97.9  F (36.6  C)   Resp 18   Ht 1.651 m (5' 5\")   Wt 66.7 kg (147 lb)   SpO2 96%   Breastfeeding No   BMI 24.46 kg/m    Body mass index is 24.46 kg/m .        Physical Exam   GENERAL: healthy, alert and no distress  RESP: lungs clear to auscultation - no rales, rhonchi or wheezes  CV: regular rate and rhythm, normal S1 S2, no S3 or S4, no murmur, click or rub, no peripheral edema and peripheral pulses strong  MS: no gross musculoskeletal defects noted, no edema                    .  ..  "

## 2022-06-22 NOTE — LETTER
June 27, 2022      Lindsey Bermudez  [address]  [facility] [address]        Dear ,    We are writing to inform you of your test results.    Positive for microalbumin. This means you have some protein in the urine. It indicates that your kidneys are being affected by higher blood pressure.   Keeping blood pressure and blood fats low is the best treatment in order to keep this  stable. People with microalbumen should avoid anti-inflamatory agents such as motrin, alleve and ibuprofen as much as possible and consider taking an ACE inhibitor ( if tolerated and can be discussed on the next follow up appointment ) to protect the kidney's       Resulted Orders   Albumin Random Urine Quantitative with Creat Ratio   Result Value Ref Range    Microalbumin Urine mg/dL 5.01 (H) 0.00 - 1.99 mg/dL    Creatinine Urine mg/dL 60 mg/dL    Microalbumin Urine mg/g Cr 83.5 (H) <=19.9 mg/g Cr    Narrative    Microalbumin, Random Urine   <2.0 mg/dL . . . . . . . . Normal   3.0-30.0 mg/dL . . . . . . Microalbuminuria   >30.0 mg/dL . . . . . .  . Clinical Proteinuria     Microalbumin/Creatinine Ratio, Random Urine   <20 mg/g . . . . .. . . . Normal    mg/g . . . . . . . Microalbuminuria   >300 mg/g . . . . . . . . Clinical Proteinuria   Comprehensive metabolic panel (BMP + Alb, Alk Phos, ALT, AST, Total. Bili, TP)   Result Value Ref Range    Sodium 143 136 - 145 mmol/L    Potassium 5.2 (H) 3.5 - 5.0 mmol/L    Chloride 109 (H) 98 - 107 mmol/L    Carbon Dioxide (CO2) 23 22 - 31 mmol/L    Anion Gap 11 5 - 18 mmol/L    Urea Nitrogen 52 (H) 8 - 28 mg/dL    Creatinine 3.17 (H) 0.60 - 1.10 mg/dL    Calcium 9.3 8.5 - 10.5 mg/dL    Glucose 84 70 - 125 mg/dL    Alkaline Phosphatase 87 45 - 120 U/L    AST 16 0 - 40 U/L    ALT 11 0 - 45 U/L    Protein Total 6.8 6.0 - 8.0 g/dL    Albumin 3.8 3.5 - 5.0 g/dL    Bilirubin Total 0.5 0.0 - 1.0 mg/dL    GFR Estimate 13 (L) >60 mL/min/1.73m2      Comment:      Effective December 21, 2021  eGFRcr in adults is calculated using the 2021 CKD-EPI creatinine equation which includes age and gender (Carlee et al., NE, DOI: 10.1056/MPEMpo7970062)   TSH   Result Value Ref Range    TSH 2.23 0.30 - 5.00 uIU/mL   CBC with platelets   Result Value Ref Range    WBC Count 4.3 4.0 - 11.0 10e3/uL    RBC Count 3.54 (L) 3.80 - 5.20 10e6/uL    Hemoglobin 10.6 (L) 11.7 - 15.7 g/dL    Hematocrit 33.4 (L) 35.0 - 47.0 %    MCV 94 78 - 100 fL    MCH 29.9 26.5 - 33.0 pg    MCHC 31.7 31.5 - 36.5 g/dL    RDW 12.6 10.0 - 15.0 %    Platelet Count 192 150 - 450 10e3/uL   Vitamin B12   Result Value Ref Range    Vitamin B12 1,605 (H) 213 - 816 pg/mL       If you have any questions or concerns, please call the clinic at the number listed above.       Sincerely,      Torey Nichols MD

## 2022-06-22 NOTE — LETTER
June 27, 2022      Lindsey A Lara  [address]  [place] [address]        Dear ,    We are writing to inform you of your test results.    Normal level      Resulted Orders   Albumin Random Urine Quantitative with Creat Ratio   Result Value Ref Range    Microalbumin Urine mg/dL 5.01 (H) 0.00 - 1.99 mg/dL    Creatinine Urine mg/dL 60 mg/dL    Microalbumin Urine mg/g Cr 83.5 (H) <=19.9 mg/g Cr    Narrative    Microalbumin, Random Urine   <2.0 mg/dL . . . . . . . . Normal   3.0-30.0 mg/dL . . . . . . Microalbuminuria   >30.0 mg/dL . . . . . .  . Clinical Proteinuria     Microalbumin/Creatinine Ratio, Random Urine   <20 mg/g . . . . .. . . . Normal    mg/g . . . . . . . Microalbuminuria   >300 mg/g . . . . . . . . Clinical Proteinuria   Comprehensive metabolic panel (BMP + Alb, Alk Phos, ALT, AST, Total. Bili, TP)   Result Value Ref Range    Sodium 143 136 - 145 mmol/L    Potassium 5.2 (H) 3.5 - 5.0 mmol/L    Chloride 109 (H) 98 - 107 mmol/L    Carbon Dioxide (CO2) 23 22 - 31 mmol/L    Anion Gap 11 5 - 18 mmol/L    Urea Nitrogen 52 (H) 8 - 28 mg/dL    Creatinine 3.17 (H) 0.60 - 1.10 mg/dL    Calcium 9.3 8.5 - 10.5 mg/dL    Glucose 84 70 - 125 mg/dL    Alkaline Phosphatase 87 45 - 120 U/L    AST 16 0 - 40 U/L    ALT 11 0 - 45 U/L    Protein Total 6.8 6.0 - 8.0 g/dL    Albumin 3.8 3.5 - 5.0 g/dL    Bilirubin Total 0.5 0.0 - 1.0 mg/dL    GFR Estimate 13 (L) >60 mL/min/1.73m2      Comment:      Effective December 21, 2021 eGFRcr in adults is calculated using the 2021 CKD-EPI creatinine equation which includes age and gender (Carlee alatorre al., NEJ, DOI: 10.1056/FYFJts1731236)   TSH   Result Value Ref Range    TSH 2.23 0.30 - 5.00 uIU/mL   CBC with platelets   Result Value Ref Range    WBC Count 4.3 4.0 - 11.0 10e3/uL    RBC Count 3.54 (L) 3.80 - 5.20 10e6/uL    Hemoglobin 10.6 (L) 11.7 - 15.7 g/dL    Hematocrit 33.4 (L) 35.0 - 47.0 %    MCV 94 78 - 100 fL    MCH 29.9 26.5 - 33.0 pg    MCHC 31.7 31.5 - 36.5  g/dL    RDW 12.6 10.0 - 15.0 %    Platelet Count 192 150 - 450 10e3/uL   Vitamin B12   Result Value Ref Range    Vitamin B12 1,605 (H) 213 - 816 pg/mL       If you have any questions or concerns, please call the clinic at the number listed above.       Sincerely,      Torey Nichols MD

## 2022-06-28 ENCOUNTER — TELEPHONE (OUTPATIENT)
Dept: INTERNAL MEDICINE | Facility: CLINIC | Age: 87
End: 2022-06-28

## 2022-06-28 NOTE — TELEPHONE ENCOUNTER
Torey Nichols MD [facility] Team Pool  Positive for microalbumin. This means you have some protein in the urine. It indicates that your kidneys are being affected by higher blood pressure.   Keeping blood pressure and blood fats low is the best treatment in order to keep this  stable. People with microalbumen should avoid anti-inflamatory agents such as motrin, alleve and ibuprofen as much as possible and consider taking an ACE inhibitor ( if tolerated and can be discussed on the next follow up appointment ) to protect the kidney's         Torey Nichols MD [facility] Team Pool  Low hemoglobin but improving and stable in comparison       Torey Nichols MD [facility] Team Pool  Elevated vitamin B12   May continue with taking the vitamin B12 supplement every other day.     Torey Nichols MD [facility] Team Pool  Mildly elevated potassium and significantly elevated creatinine and BUN with low renal function.   History of chronic renal failure   Keep hydrated and Follow up with nephrology

## 2022-06-28 NOTE — LETTER
July 7, 2022      Lindsey Bermudez  [address]  [facility] [address]        Dear ,    We are writing to inform you of your test results.    Test results indicate you may require additional follow up, see comment below.    Ensure you scheduled follow up with nephrology.  Avoid NSAIDs like ibuprofen, naproxen, motrin and aleve.  Remember to start taking your B12 every other day or splitting it in half and taking half tab daily.       If you have any questions or concerns, please call the clinic at the number listed above.       Sincerely,    Dr. Nichols's care team

## 2022-06-28 NOTE — TELEPHONE ENCOUNTER
Attempted to contact patient to answer her question regarding statin below as well as give her results from Dr. CHOU below.  If patient calls back please give her results messages below.

## 2022-06-28 NOTE — TELEPHONE ENCOUNTER
Patient calling to get a call back on some instructions she needs clarification to.    Patient is questioning whether she needs to stop taking the simvastatin.    Patient will be home for about an hour otherwise please call back later in the afternoon.    Call Back  [phone]

## 2022-07-07 NOTE — TELEPHONE ENCOUNTER
Contacted patient and reviewed all below results and recommendations from Dr. Nichols.  Patient also requests information be sent via mail as well, informed her letter will be sent today.  Patient thankful for call back and explanation.

## 2022-10-05 ENCOUNTER — OFFICE VISIT (OUTPATIENT)
Dept: FAMILY MEDICINE | Facility: CLINIC | Age: 87
End: 2022-10-05
Payer: MEDICARE

## 2022-10-05 VITALS
DIASTOLIC BLOOD PRESSURE: 83 MMHG | OXYGEN SATURATION: 96 % | SYSTOLIC BLOOD PRESSURE: 164 MMHG | HEIGHT: 65 IN | WEIGHT: 142.1 LBS | HEART RATE: 84 BPM | BODY MASS INDEX: 23.68 KG/M2 | TEMPERATURE: 98 F

## 2022-10-05 DIAGNOSIS — E03.9 PRIMARY HYPOTHYROIDISM: ICD-10-CM

## 2022-10-05 DIAGNOSIS — I10 ESSENTIAL HYPERTENSION: Primary | ICD-10-CM

## 2022-10-05 DIAGNOSIS — N18.4 CKD (CHRONIC KIDNEY DISEASE) STAGE 4, GFR 15-29 ML/MIN (H): ICD-10-CM

## 2022-10-05 LAB
ANION GAP SERPL CALCULATED.3IONS-SCNC: 10 MMOL/L (ref 7–15)
BUN SERPL-MCNC: 39.7 MG/DL (ref 8–23)
CALCIUM SERPL-MCNC: 9.3 MG/DL (ref 8.2–9.6)
CHLORIDE SERPL-SCNC: 106 MMOL/L (ref 98–107)
CREAT SERPL-MCNC: 2.03 MG/DL (ref 0.51–0.95)
DEPRECATED HCO3 PLAS-SCNC: 25 MMOL/L (ref 22–29)
GFR SERPL CREATININE-BSD FRML MDRD: 23 ML/MIN/1.73M2
GLUCOSE SERPL-MCNC: 94 MG/DL (ref 70–99)
POTASSIUM SERPL-SCNC: 5.4 MMOL/L (ref 3.4–5.3)
SODIUM SERPL-SCNC: 141 MMOL/L (ref 136–145)
TSH SERPL DL<=0.005 MIU/L-ACNC: 2.38 UIU/ML (ref 0.3–4.2)

## 2022-10-05 PROCEDURE — 36415 COLL VENOUS BLD VENIPUNCTURE: CPT | Performed by: FAMILY MEDICINE

## 2022-10-05 PROCEDURE — 84443 ASSAY THYROID STIM HORMONE: CPT | Performed by: FAMILY MEDICINE

## 2022-10-05 PROCEDURE — 80048 BASIC METABOLIC PNL TOTAL CA: CPT | Performed by: FAMILY MEDICINE

## 2022-10-05 PROCEDURE — 99214 OFFICE O/P EST MOD 30 MIN: CPT | Performed by: FAMILY MEDICINE

## 2022-10-05 ASSESSMENT — PAIN SCALES - GENERAL: PAINLEVEL: NO PAIN (0)

## 2022-10-05 NOTE — PROGRESS NOTES
"  Assessment & Plan     Essential hypertension/ CKD stage IV  - BASIC METABOLIC PANEL- stable in comparison  -Elevated BP  Continue on amlodipine 2.5 mg daily  Management per nephrology      Primary hypothyroidism  - TSH with free T4 reflex  Normal   Continue on levothyroxine 100 mcg daily, refill sent                   No follow-ups on file.    Torey Nichols MD  [facility] LILLIAN Portillo is a 89 year old accompanied by her Brother-in-law, presenting for the following health issues:  Hypertension:  Due to CKD  On amlodipine 2.5 mg daily, managed by nephrology     Hypothyroidism:  On levothyroxine 100 mcg daily, needs a refill    History of Present Illness       Reason for visit:  Followup    She eats 2-3 servings of fruits and vegetables daily.She consumes 1 sweetened beverage(s) daily.She exercises with enough effort to increase her heart rate 9 or less minutes per day.  She exercises with enough effort to increase her heart rate 3 or less days per week.   She is taking medications regularly.          Review of Systems         Objective    BP (!) 164/83 (BP Location: Right arm, Patient Position: Left side, Cuff Size: Adult Regular)   Pulse 84   Temp 98  F (36.7  C) (Oral)   Ht 1.651 m (5' 5\")   Wt 64.5 kg (142 lb 1.6 oz)   SpO2 96%   Breastfeeding No   BMI 23.65 kg/m    Body mass index is 23.65 kg/m .       Physical Exam                       "

## 2022-10-05 NOTE — LETTER
October 13, 2022      Lindsey FIONA Lara  [address]  [place] [address]        Dear ,    We are writing to inform you of your test results.    Your test results fall within the expected range(s) or remain unchanged from previous results.  Please continue with current treatment plan.    Resulted Orders   BASIC METABOLIC PANEL   Result Value Ref Range    Sodium 141 136 - 145 mmol/L    Potassium 5.4 (H) 3.4 - 5.3 mmol/L    Chloride 106 98 - 107 mmol/L    Carbon Dioxide (CO2) 25 22 - 29 mmol/L    Anion Gap 10 7 - 15 mmol/L    Urea Nitrogen 39.7 (H) 8.0 - 23.0 mg/dL    Creatinine 2.03 (H) 0.51 - 0.95 mg/dL    Calcium 9.3 8.2 - 9.6 mg/dL    Glucose 94 70 - 99 mg/dL    GFR Estimate 23 (L) >60 mL/min/1.73m2      Comment:      Effective December 21, 2021 eGFRcr in adults is calculated using the 2021 CKD-EPI creatinine equation which includes age and gender (Carlee et al., NEJM, DOI: 10.1056/IBYTec6489907)   TSH with free T4 reflex   Result Value Ref Range    TSH 2.38 0.30 - 4.20 uIU/mL   Mildly elevated potassium   Elevated BUN and creatinine with decrease in renal functions, stable in comparison          If you have any questions or concerns, please call the clinic at the number listed above.       Sincerely,      Torey Nichols MD

## 2022-10-05 NOTE — LETTER
October 13, 2022      Lindsey FIONA Lara  [address]  [facility] [address]        Dear ,    We are writing to inform you of your test results.    Your test results fall within the expected range(s) or remain unchanged from previous results.  Please continue with current treatment plan.    Resulted Orders   BASIC METABOLIC PANEL   Result Value Ref Range    Sodium 141 136 - 145 mmol/L    Potassium 5.4 (H) 3.4 - 5.3 mmol/L    Chloride 106 98 - 107 mmol/L    Carbon Dioxide (CO2) 25 22 - 29 mmol/L    Anion Gap 10 7 - 15 mmol/L    Urea Nitrogen 39.7 (H) 8.0 - 23.0 mg/dL    Creatinine 2.03 (H) 0.51 - 0.95 mg/dL    Calcium 9.3 8.2 - 9.6 mg/dL    Glucose 94 70 - 99 mg/dL    GFR Estimate 23 (L) >60 mL/min/1.73m2      Comment:      Effective December 21, 2021 eGFRcr in adults is calculated using the 2021 CKD-EPI creatinine equation which includes age and gender (Carlee et al., NEJM, DOI: 10.1056/WKNAyg8559086)   TSH with free T4 reflex   Result Value Ref Range    TSH 2.38 0.30 - 4.20 uIU/mL       If you have any questions or concerns, please call the clinic at the number listed above.       Sincerely,      Torey Nichols MD

## 2022-10-12 ENCOUNTER — ALLIED HEALTH/NURSE VISIT (OUTPATIENT)
Dept: FAMILY MEDICINE | Facility: CLINIC | Age: 87
End: 2022-10-12
Payer: MEDICARE

## 2022-10-12 DIAGNOSIS — Z23 ENCOUNTER FOR IMMUNIZATION: Primary | ICD-10-CM

## 2022-10-12 PROCEDURE — G0008 ADMIN INFLUENZA VIRUS VAC: HCPCS | Mod: 59

## 2022-10-12 PROCEDURE — 0124A COVID-19,PF,PFIZER BOOSTER BIVALENT: CPT

## 2022-10-12 PROCEDURE — 99207 PR NO CHARGE NURSE ONLY: CPT

## 2022-10-12 PROCEDURE — 90662 IIV NO PRSV INCREASED AG IM: CPT

## 2022-10-12 PROCEDURE — 91312 COVID-19,PF,PFIZER BOOSTER BIVALENT: CPT

## 2022-10-12 RX ORDER — LEVOTHYROXINE SODIUM 100 UG/1
100 TABLET ORAL DAILY
Qty: 90 TABLET | Refills: 3 | Status: SHIPPED | OUTPATIENT
Start: 2022-10-12 | End: 2023-10-10

## 2022-10-17 ENCOUNTER — TELEPHONE (OUTPATIENT)
Dept: FAMILY MEDICINE | Facility: CLINIC | Age: 87
End: 2022-10-17

## 2022-10-17 NOTE — TELEPHONE ENCOUNTER
Called patient and informed her of the results below. Patient had no further questions at this time.

## 2022-10-17 NOTE — TELEPHONE ENCOUNTER
----- Message from Torey Nichols MD sent at 10/12/2022  3:55 PM CDT -----  Please call patient:  Mildly elevated potassium  Elevated BUN and creatinine with decrease in renal functions, stable in comparison

## 2022-11-02 ENCOUNTER — OFFICE VISIT (OUTPATIENT)
Dept: FAMILY MEDICINE | Facility: CLINIC | Age: 87
End: 2022-11-02
Payer: MEDICARE

## 2022-11-02 VITALS
HEART RATE: 72 BPM | OXYGEN SATURATION: 98 % | BODY MASS INDEX: 25.37 KG/M2 | DIASTOLIC BLOOD PRESSURE: 77 MMHG | WEIGHT: 152.3 LBS | HEIGHT: 65 IN | TEMPERATURE: 98.3 F | SYSTOLIC BLOOD PRESSURE: 159 MMHG

## 2022-11-02 DIAGNOSIS — R35.0 URINARY FREQUENCY: Primary | ICD-10-CM

## 2022-11-02 LAB
ALBUMIN UR-MCNC: 30 MG/DL
APPEARANCE UR: ABNORMAL
BACTERIA #/AREA URNS HPF: ABNORMAL /HPF
BILIRUB UR QL STRIP: NEGATIVE
COLOR UR AUTO: YELLOW
GLUCOSE UR STRIP-MCNC: NEGATIVE MG/DL
HGB UR QL STRIP: NEGATIVE
KETONES UR STRIP-MCNC: NEGATIVE MG/DL
LEUKOCYTE ESTERASE UR QL STRIP: ABNORMAL
NITRATE UR QL: POSITIVE
PH UR STRIP: 6 [PH] (ref 5–8)
RBC #/AREA URNS AUTO: ABNORMAL /HPF
SP GR UR STRIP: 1.02 (ref 1–1.03)
SQUAMOUS #/AREA URNS AUTO: ABNORMAL /LPF
UROBILINOGEN UR STRIP-ACNC: 0.2 E.U./DL
WBC #/AREA URNS AUTO: ABNORMAL /HPF
WBC CLUMPS #/AREA URNS HPF: PRESENT /HPF

## 2022-11-02 PROCEDURE — 87086 URINE CULTURE/COLONY COUNT: CPT | Performed by: FAMILY MEDICINE

## 2022-11-02 PROCEDURE — 99214 OFFICE O/P EST MOD 30 MIN: CPT | Performed by: FAMILY MEDICINE

## 2022-11-02 PROCEDURE — 81001 URINALYSIS AUTO W/SCOPE: CPT | Performed by: FAMILY MEDICINE

## 2022-11-02 ASSESSMENT — PAIN SCALES - GENERAL: PAINLEVEL: NO PAIN (0)

## 2022-11-02 NOTE — PROGRESS NOTES
"  Assessment & Plan     (R35.0) Urinary frequency  (primary encounter diagnosis)  Comment: consider OAB   Rule out infection, and consider treating for postmenopausal vaginitis/bladder instability      Plan: UA macro with reflex to Microscopic and Culture        - Clinc Collect        probable contamination during collection. Suggest repeat specimen          {Provider  Link to [facility] Help Grid :800258}           No follow-ups on file.    Torey Nichols MD  [facility] LILLIAN Portillo is a 89 year old accompanied by her Brother in Law, Ward, presenting for having frequency of urination but stating happening since her cholecystectomy a few months ago.  She has no dysuria or urgency and does not feel ill.       History of Present Illness       Reason for visit:  Follop    She eats 2-3 servings of fruits and vegetables daily.She consumes 1 sweetened beverage(s) daily.She exercises with enough effort to increase her heart rate 9 or less minutes per day.  She exercises with enough effort to increase her heart rate 3 or less days per week.   She is taking medications regularly.             Review of Systems         Objective    BP (!) 159/77 (BP Location: Right arm, Patient Position: Left side, Cuff Size: Adult Regular)   Pulse 72   Temp 98.3  F (36.8  C) (Oral)   Ht 1.651 m (5' 5\")   Wt 69.1 kg (152 lb 4.8 oz)   SpO2 98%   Breastfeeding No   BMI 25.34 kg/m    Body mass index is 25.34 kg/m .  Physical Exam                       "

## 2022-11-04 LAB — BACTERIA UR CULT: NORMAL

## 2023-06-07 DIAGNOSIS — I10 ESSENTIAL HYPERTENSION: ICD-10-CM

## 2023-06-08 NOTE — TELEPHONE ENCOUNTER
"Routing refill request to provider for review/approval because:  Labs out of range:  Cr, bp    Last Written Prescription Date:  5/9/22  Last Fill Quantity: 90,  # refills: 3   Last office visit provider:  11/2/22     Requested Prescriptions   Pending Prescriptions Disp Refills     amLODIPine (NORVASC) 2.5 MG tablet 90 tablet 3     Sig: Take 1 tablet (2.5 mg) by mouth daily       Calcium Channel Blockers Protocol  Failed - 6/7/2023 11:47 AM        Failed - Blood pressure under 140/90 in past 12 months     BP Readings from Last 3 Encounters:   11/02/22 (!) 159/77   10/05/22 (!) 164/83   06/22/22 128/65                 Failed - Normal serum creatinine on file in past 12 months     Recent Labs   Lab Test 10/05/22  1501   CR 2.03*       Ok to refill medication if creatinine is low          Passed - Recent (12 mo) or future (30 days) visit within the authorizing provider's specialty     Patient has had an office visit with the authorizing provider or a provider within the authorizing providers department within the previous 12 mos or has a future within next 30 days. See \"Patient Info\" tab in inbasket, or \"Choose Columns\" in Meds & Orders section of the refill encounter.              Passed - Medication is active on med list        Passed - Patient is age 18 or older        Passed - No active pregnancy on record        Passed - No positive pregnancy test in past 12 months             Inessa Sosa RN 06/07/23 7:01 PM  "

## 2023-06-09 RX ORDER — AMLODIPINE BESYLATE 2.5 MG/1
2.5 TABLET ORAL DAILY
Qty: 90 TABLET | Refills: 1 | Status: SHIPPED | OUTPATIENT
Start: 2023-06-09 | End: 2024-03-04

## 2023-10-10 DIAGNOSIS — E03.9 PRIMARY HYPOTHYROIDISM: ICD-10-CM

## 2023-10-11 RX ORDER — LEVOTHYROXINE SODIUM 100 UG/1
100 TABLET ORAL DAILY
Qty: 90 TABLET | Refills: 0 | Status: SHIPPED | OUTPATIENT
Start: 2023-10-11

## 2023-11-08 ENCOUNTER — OFFICE VISIT (OUTPATIENT)
Dept: FAMILY MEDICINE | Facility: CLINIC | Age: 88
End: 2023-11-08
Payer: MEDICARE

## 2023-11-08 VITALS
TEMPERATURE: 97.8 F | DIASTOLIC BLOOD PRESSURE: 75 MMHG | WEIGHT: 132.1 LBS | BODY MASS INDEX: 23.41 KG/M2 | RESPIRATION RATE: 20 BRPM | OXYGEN SATURATION: 96 % | HEIGHT: 63 IN | SYSTOLIC BLOOD PRESSURE: 137 MMHG | HEART RATE: 71 BPM

## 2023-11-08 DIAGNOSIS — Z51.89 VISIT FOR WOUND CHECK: Primary | ICD-10-CM

## 2023-11-08 DIAGNOSIS — N25.81 SECONDARY HYPERPARATHYROIDISM OF RENAL ORIGIN (H): ICD-10-CM

## 2023-11-08 DIAGNOSIS — Z23 NEED FOR VACCINATION: ICD-10-CM

## 2023-11-08 PROCEDURE — 90662 IIV NO PRSV INCREASED AG IM: CPT | Performed by: FAMILY MEDICINE

## 2023-11-08 PROCEDURE — G0008 ADMIN INFLUENZA VIRUS VAC: HCPCS | Performed by: FAMILY MEDICINE

## 2023-11-08 PROCEDURE — 99212 OFFICE O/P EST SF 10 MIN: CPT | Mod: 25 | Performed by: FAMILY MEDICINE

## 2023-11-08 RX ORDER — RESPIRATORY SYNCYTIAL VIRUS VACCINE 120MCG/0.5
0.5 KIT INTRAMUSCULAR ONCE
Qty: 1 EACH | Refills: 0 | Status: CANCELLED | OUTPATIENT
Start: 2023-11-08 | End: 2023-11-08

## 2023-11-08 ASSESSMENT — PAIN SCALES - GENERAL: PAINLEVEL: MILD PAIN (3)

## 2023-11-08 NOTE — PROGRESS NOTES
"  Assessment & Plan     Visit for wound check  Staples removed    Secondary hyperparathyroidism of renal origin (H24)    Need for vaccination    - INFLUENZA VACCINE 65+ (FLUZONE HD)           MED REC REQUIRED  Post Medication Reconciliation Status: discharge medications reconciled and changed, per note/orders      Torey Nichols MD  [facility]    Ayla Portillo is a 89 year old, presenting for the following health issues:  ER F/U (Laceration of scalp, staple removal)        11/8/2023     2:14 PM   Additional Questions   Roomed by ELISA Anguiano   Accompanied by        HPI       ED/UC Followup:    Facility:  [facility] ED  Date of visit: 11/1/2023  Reason for visit: Laceration of scalp without foreign body  Current Status: No pain or itchiness. She hasn't washed or combed her hair since getting the staples put in. They are located in the back of her head.         Review of Systems         Objective    /75 (BP Location: Right arm, Patient Position: Sitting, Cuff Size: Adult Regular)   Pulse 71   Temp 97.8  F (36.6  C) (Oral)   Resp 20   Ht 1.6 m (5' 3\")   Wt 59.9 kg (132 lb 1.6 oz)   LMP  (LMP Unknown)   SpO2 96%   BMI 23.40 kg/m    Body mass index is 23.4 kg/m .    Physical Exam                         "

## 2023-12-11 ENCOUNTER — HOSPITAL ENCOUNTER (OUTPATIENT)
Facility: HOSPITAL | Age: 88
Setting detail: OBSERVATION
Discharge: NURSING, REHAB OR LONG-TERM CARE | End: 2023-12-15
Attending: STUDENT IN AN ORGANIZED HEALTH CARE EDUCATION/TRAINING PROGRAM | Admitting: STUDENT IN AN ORGANIZED HEALTH CARE EDUCATION/TRAINING PROGRAM
Payer: MEDICARE

## 2023-12-11 ENCOUNTER — APPOINTMENT (OUTPATIENT)
Dept: CT IMAGING | Facility: HOSPITAL | Age: 88
End: 2023-12-11
Attending: STUDENT IN AN ORGANIZED HEALTH CARE EDUCATION/TRAINING PROGRAM
Payer: MEDICARE

## 2023-12-11 DIAGNOSIS — R53.1 GENERALIZED WEAKNESS: ICD-10-CM
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
## 2023-12-11 DIAGNOSIS — L85.3 DRY SKIN: Primary | ICD-10-CM

## 2023-12-11 LAB
ALBUMIN SERPL BCG-MCNC: 4 G/DL (ref 3.5–5.2)
ALBUMIN UR-MCNC: 20 MG/DL
ALP SERPL-CCNC: 85 U/L (ref 40–150)
ALT SERPL W P-5'-P-CCNC: 16 U/L (ref 0–50)
ANION GAP SERPL CALCULATED.3IONS-SCNC: 10 MMOL/L (ref 7–15)
APPEARANCE UR: CLEAR
AST SERPL W P-5'-P-CCNC: 18 U/L (ref 0–45)
BACTERIA #/AREA URNS HPF: ABNORMAL /HPF
BASOPHILS # BLD AUTO: 0 10E3/UL (ref 0–0.2)
BASOPHILS NFR BLD AUTO: 1 %
BILIRUB SERPL-MCNC: 0.2 MG/DL
BILIRUB UR QL STRIP: NEGATIVE
BUN SERPL-MCNC: 37.5 MG/DL (ref 8–23)
CALCIUM SERPL-MCNC: 9.1 MG/DL (ref 8.2–9.6)
CHLORIDE SERPL-SCNC: 109 MMOL/L (ref 98–107)
COLOR UR AUTO: ABNORMAL
CREAT SERPL-MCNC: 1.68 MG/DL (ref 0.51–0.95)
DEPRECATED HCO3 PLAS-SCNC: 24 MMOL/L (ref 22–29)
EGFRCR SERPLBLD CKD-EPI 2021: 28 ML/MIN/1.73M2
EOSINOPHIL # BLD AUTO: 0.1 10E3/UL (ref 0–0.7)
EOSINOPHIL NFR BLD AUTO: 1 %
ERYTHROCYTE [DISTWIDTH] IN BLOOD BY AUTOMATED COUNT: 12.8 % (ref 10–15)
GLUCOSE SERPL-MCNC: 89 MG/DL (ref 70–99)
GLUCOSE UR STRIP-MCNC: NEGATIVE MG/DL
HCT VFR BLD AUTO: 36.1 % (ref 35–47)
HGB BLD-MCNC: 11.4 G/DL (ref 11.7–15.7)
HGB UR QL STRIP: NEGATIVE
IMM GRANULOCYTES # BLD: 0 10E3/UL
IMM GRANULOCYTES NFR BLD: 0 %
KETONES UR STRIP-MCNC: NEGATIVE MG/DL
LEUKOCYTE ESTERASE UR QL STRIP: ABNORMAL
LYMPHOCYTES # BLD AUTO: 0.7 10E3/UL (ref 0.8–5.3)
LYMPHOCYTES NFR BLD AUTO: 17 %
MCH RBC QN AUTO: 30.9 PG (ref 26.5–33)
MCHC RBC AUTO-ENTMCNC: 31.6 G/DL (ref 31.5–36.5)
MCV RBC AUTO: 98 FL (ref 78–100)
MONOCYTES # BLD AUTO: 0.5 10E3/UL (ref 0–1.3)
MONOCYTES NFR BLD AUTO: 13 %
MUCOUS THREADS #/AREA URNS LPF: PRESENT /LPF
NEUTROPHILS # BLD AUTO: 2.8 10E3/UL (ref 1.6–8.3)
NEUTROPHILS NFR BLD AUTO: 68 %
NITRATE UR QL: POSITIVE
NRBC # BLD AUTO: 0 10E3/UL
NRBC BLD AUTO-RTO: 0 /100
PH UR STRIP: 6 [PH] (ref 5–7)
PLATELET # BLD AUTO: 205 10E3/UL (ref 150–450)
POTASSIUM SERPL-SCNC: 4.9 MMOL/L (ref 3.4–5.3)
PROT SERPL-MCNC: 6.5 G/DL (ref 6.4–8.3)
RBC # BLD AUTO: 3.69 10E6/UL (ref 3.8–5.2)
RBC URINE: 0 /HPF
SODIUM SERPL-SCNC: 143 MMOL/L (ref 135–145)
SP GR UR STRIP: 1.01 (ref 1–1.03)
UROBILINOGEN UR STRIP-MCNC: <2 MG/DL
WBC # BLD AUTO: 4.1 10E3/UL (ref 4–11)
WBC URINE: 15 /HPF

## 2023-12-11 PROCEDURE — 81001 URINALYSIS AUTO W/SCOPE: CPT | Performed by: STUDENT IN AN ORGANIZED HEALTH CARE EDUCATION/TRAINING PROGRAM

## 2023-12-11 PROCEDURE — 93005 ELECTROCARDIOGRAM TRACING: CPT | Performed by: STUDENT IN AN ORGANIZED HEALTH CARE EDUCATION/TRAINING PROGRAM

## 2023-12-11 PROCEDURE — G1010 CDSM STANSON: HCPCS

## 2023-12-11 PROCEDURE — G0378 HOSPITAL OBSERVATION PER HR: HCPCS

## 2023-12-11 PROCEDURE — 36415 COLL VENOUS BLD VENIPUNCTURE: CPT | Performed by: STUDENT IN AN ORGANIZED HEALTH CARE EDUCATION/TRAINING PROGRAM

## 2023-12-11 PROCEDURE — 96365 THER/PROPH/DIAG IV INF INIT: CPT

## 2023-12-11 PROCEDURE — 250N000011 HC RX IP 250 OP 636: Performed by: STUDENT IN AN ORGANIZED HEALTH CARE EDUCATION/TRAINING PROGRAM

## 2023-12-11 PROCEDURE — 99285 EMERGENCY DEPT VISIT HI MDM: CPT | Mod: 25

## 2023-12-11 PROCEDURE — 87086 URINE CULTURE/COLONY COUNT: CPT | Performed by: STUDENT IN AN ORGANIZED HEALTH CARE EDUCATION/TRAINING PROGRAM

## 2023-12-11 PROCEDURE — 99222 1ST HOSP IP/OBS MODERATE 55: CPT | Mod: AI | Performed by: STUDENT IN AN ORGANIZED HEALTH CARE EDUCATION/TRAINING PROGRAM

## 2023-12-11 PROCEDURE — 80053 COMPREHEN METABOLIC PANEL: CPT | Performed by: STUDENT IN AN ORGANIZED HEALTH CARE EDUCATION/TRAINING PROGRAM

## 2023-12-11 PROCEDURE — 85025 COMPLETE CBC W/AUTO DIFF WBC: CPT | Performed by: STUDENT IN AN ORGANIZED HEALTH CARE EDUCATION/TRAINING PROGRAM

## 2023-12-11 RX ORDER — FOLIC ACID 1 MG/1
1 TABLET ORAL DAILY
Status: DISCONTINUED | OUTPATIENT
Start: 2023-12-11 | End: 2023-12-15 | Stop reason: HOSPADM

## 2023-12-11 RX ORDER — ACETAMINOPHEN 325 MG/1
650 TABLET ORAL EVERY 4 HOURS PRN
Status: DISCONTINUED | OUTPATIENT
Start: 2023-12-11 | End: 2023-12-15 | Stop reason: HOSPADM

## 2023-12-11 RX ORDER — AMLODIPINE BESYLATE 2.5 MG/1
2.5 TABLET ORAL DAILY
Status: DISCONTINUED | OUTPATIENT
Start: 2023-12-11 | End: 2023-12-11

## 2023-12-11 RX ORDER — LANOLIN ALCOHOL/MO/W.PET/CERES
1000 CREAM (GRAM) TOPICAL DAILY
Status: DISCONTINUED | OUTPATIENT
Start: 2023-12-11 | End: 2023-12-15 | Stop reason: HOSPADM

## 2023-12-11 RX ORDER — AMLODIPINE BESYLATE 2.5 MG/1
2.5 TABLET ORAL DAILY
Status: DISCONTINUED | OUTPATIENT
Start: 2023-12-12 | End: 2023-12-15 | Stop reason: HOSPADM

## 2023-12-11 RX ORDER — AMOXICILLIN 250 MG
2 CAPSULE ORAL 2 TIMES DAILY PRN
Status: DISCONTINUED | OUTPATIENT
Start: 2023-12-11 | End: 2023-12-15 | Stop reason: HOSPADM

## 2023-12-11 RX ORDER — ASPIRIN 81 MG/1
81 TABLET ORAL DAILY
Status: DISCONTINUED | OUTPATIENT
Start: 2023-12-11 | End: 2023-12-15 | Stop reason: HOSPADM

## 2023-12-11 RX ORDER — ONDANSETRON 2 MG/ML
4 INJECTION INTRAMUSCULAR; INTRAVENOUS EVERY 6 HOURS PRN
Status: DISCONTINUED | OUTPATIENT
Start: 2023-12-11 | End: 2023-12-15 | Stop reason: HOSPADM

## 2023-12-11 RX ORDER — ONDANSETRON 4 MG/1
4 TABLET, ORALLY DISINTEGRATING ORAL EVERY 6 HOURS PRN
Status: DISCONTINUED | OUTPATIENT
Start: 2023-12-11 | End: 2023-12-15 | Stop reason: HOSPADM

## 2023-12-11 RX ORDER — CEFTRIAXONE 1 G/1
1 INJECTION, POWDER, FOR SOLUTION INTRAMUSCULAR; INTRAVENOUS EVERY 24 HOURS
Status: DISCONTINUED | OUTPATIENT
Start: 2023-12-12 | End: 2023-12-13

## 2023-12-11 RX ORDER — ACETAMINOPHEN 650 MG/1
650 SUPPOSITORY RECTAL EVERY 4 HOURS PRN
Status: DISCONTINUED | OUTPATIENT
Start: 2023-12-11 | End: 2023-12-15 | Stop reason: HOSPADM

## 2023-12-11 RX ORDER — LEVOTHYROXINE SODIUM 100 UG/1
100 TABLET ORAL DAILY
Status: DISCONTINUED | OUTPATIENT
Start: 2023-12-11 | End: 2023-12-15 | Stop reason: HOSPADM

## 2023-12-11 RX ORDER — CEFTRIAXONE 1 G/1
1 INJECTION, POWDER, FOR SOLUTION INTRAMUSCULAR; INTRAVENOUS ONCE
Status: COMPLETED | OUTPATIENT
Start: 2023-12-11 | End: 2023-12-11

## 2023-12-11 RX ORDER — CEFTRIAXONE 1 G/1
1 INJECTION, POWDER, FOR SOLUTION INTRAMUSCULAR; INTRAVENOUS EVERY 24 HOURS
Status: DISCONTINUED | OUTPATIENT
Start: 2023-12-12 | End: 2023-12-11

## 2023-12-11 RX ORDER — AMOXICILLIN 250 MG
1 CAPSULE ORAL 2 TIMES DAILY PRN
Status: DISCONTINUED | OUTPATIENT
Start: 2023-12-11 | End: 2023-12-15 | Stop reason: HOSPADM

## 2023-12-11 RX ORDER — HYDRALAZINE HYDROCHLORIDE 20 MG/ML
5 INJECTION INTRAMUSCULAR; INTRAVENOUS EVERY 4 HOURS PRN
Status: DISCONTINUED | OUTPATIENT
Start: 2023-12-11 | End: 2023-12-15 | Stop reason: HOSPADM

## 2023-12-11 RX ADMIN — CEFTRIAXONE SODIUM 1 G: 1 INJECTION, POWDER, FOR SOLUTION INTRAMUSCULAR; INTRAVENOUS at 13:38

## 2023-12-11 ASSESSMENT — ACTIVITIES OF DAILY LIVING (ADL)
ADLS_ACUITY_SCORE: 35
ADLS_ACUITY_SCORE: 41
ADLS_ACUITY_SCORE: 37

## 2023-12-11 NOTE — ED NOTES
Bed: JNED-  Expected date: 12/11/23  Expected time: 9:35 AM  Means of arrival:   Comments:  Vita weakness/92

## 2023-12-11 NOTE — ED NOTES
"Pts niece, Marylin, voices concern that pt lives alone and is unable to care for self. They have meals on wheels set up but pt has been refusing all other resources.Family is requesting a \"SLUMS\" test  ED care manager updated.  "

## 2023-12-11 NOTE — H&P
[facility]    History and Physical - Hospitalist Service       Date of Admission:  12/11/2023    Assessment & Plan      Lindsey Bermudez is a 89 year old female admitted on 12/11/2023. She has PMHx of hypothyroidism, CKD IV presented for generalized weakness and multiple recent falls.  She lives by herself.  In the ED CT of the head and cervical spine were unremarkable.  Urinalysis was suggestive of UTI.  Started on ceftriaxone patient will be admitted for generalized weakness and a UTI    Generalized weakness  Frequent falls  --CT head and cervical spine unremarkable  -- PT OT eval  -- CM consult for placement    Acute metabolic encephalopathy  UTI  -- UA suggestive of UTI  -- Continue ceftriaxone  -- Follow urine culture    ?underlying dementia  -- OT SLUMS     Right toe wound  -- No s/o infection  -- wound care consult    Elevate BP without the diagnosis of HTN  -- monitor vital signs per protocol  -- hydralazin iv prn    CKD IV  -- Avoid nephrotoxic meds  -- Monitor BMP    Hypothyroidism  -- c/w ptA levothyroxine     Observation Goals: -diagnostic tests and consults completed and resulted, -vital signs normal or at patient baseline, Nurse to notify provider when observation goals have been met and patient is ready for discharge.  Diet: 2 Gram Sodium Diet  DVT Prophylaxis: Pneumatic Compression Devices  Jones Catheter: Not present  Lines: None     Cardiac Monitoring: None  Code Status: No CPR- Do NOT Intubate    Clinically Significant Risk Factors Present on Admission                # Drug Induced Platelet Defect: home medication list includes an antiplatelet medication   # Hypertension: Noted on problem list                 Disposition Plan      Expected Discharge Date: 12/12/2023                  Elise Tompkins MD  Hospitalist Service  [facility]  Securely message with [employer] info)  Text page via [employer] Paging/Directory      ______________________________________________________________________    Chief Complaint   Generalized weakness    History is obtained from the patient, daughter    History of Present Illness   Lindsey Bermudez is a 89 year old female who has PMHx of hypothyroidism, CKD IV presented for generalized weakness and multiple recent falls.  She lives by herself.  In the ED CT of the head and cervical spine were unremarkable.  Urinalysis was suggestive of UTI.  Started on ceftriaxone patient will be admitted for generalized weakness and a UTI      Past Medical History    Past Medical History:   Diagnosis Date    Anemia, unspecified     Created by Conversion     CKD (chronic kidney disease) stage 4, GFR 15-29 ml/min (H) 12/11/2015    Disease of thyroid gland     hypothyroidism    Diverticulitis of colon     Created by Conversion  Replacement Utility updated for latest IMO load    Eczema     Hydronephrosis     Hyperlipidemia     Hypertension     Hypothyroidism     Created by Conversion  Replacement Utility updated for latest IMO load    Osteoarthritis     Created by Conversion  Replacement Utility updated for latest IMO load    Skin cancer     Ureteral stone        Past Surgical History   Past Surgical History:   Procedure Laterality Date    ANKLE SURGERY      plate    BACK SURGERY      BREAST SURGERY      biopsies    CATARACT EXTRACTION      COMBINED CYSTOSCOPY, INSERT STENT URETER(S) N/A 11/9/2015    Procedure: CYSTOSCOPY, BLADDER BIOPSY AND FULGERATION.;  Surgeon: Linn Gilbert MD;  Location: [facility] OR;  Service:     IR LUMBAR EPIDURAL STEROID INJECTION  5/5/2003    IR LUMBAR EPIDURAL STEROID INJECTION  7/9/2004    IR LUMBAR EPIDURAL STEROID INJECTION  6/1/2007    IR LUMBAR EPIDURAL STEROID INJECTION  6/18/2007    IR NEPHROLITHOTOMY  12/10/2015    IR NEPHROSTOMY TUBE PLACEMENT RIGHT  11/9/2015    LAPAROSCOPIC CHOLECYSTECTOMY N/A 2/16/2022    Procedure: CHOLECYSTECTOMY, LAPAROSCOPIC;  Surgeon:  Moreno Townsend MD;  Location: [facility] OR    NEPHROSTOMY W/ INTRODUCTION OF CATHETER Right        Prior to Admission Medications   Prior to Admission Medications   Prescriptions Last Dose Informant Patient Reported? Taking?   ACE/ARB/ARNI NOT PRESCRIBED (INTENTIONAL)   No No   Sig: Please choose reason not prescribed from choices below.   acetaminophen (TYLENOL) 325 MG tablet   No Yes   Sig: Take 2 tablets (650 mg) by mouth every 6 hours as needed for mild pain or other (and adjunct with moderate or severe pain or per patient request)   amLODIPine (NORVASC) 2.5 MG tablet 12/10/2023 at am  No Yes   Sig: Take 1 tablet (2.5 mg) by mouth daily   aspirin 81 MG EC tablet 12/10/2023 at am  Yes Yes   Sig: Take 81 mg by mouth daily   calcium citrate-vitamin D (CITRACAL+D) 315-200 mg-unit per tablet 12/10/2023 at pm  Yes Yes   Sig: Take 1 tablet by mouth 2 times daily With lunch and dinner   cyanocobalamin 1000 MCG tablet 12/10/2023 at am  No Yes   Sig: [CYANOCOBALAMIN 1000 MCG TABLET] Take 1 tablet (1,000 mcg total) by mouth daily.   folic acid (FOLVITE) 1 MG tablet 12/10/2023 at am  No Yes   Sig: [FOLIC ACID (FOLVITE) 1 MG TABLET] Take 1 tablet (1 mg total) by mouth daily.   levothyroxine (SYNTHROID/LEVOTHROID) 100 MCG tablet 12/10/2023 at am  No Yes   Sig: Take 1 tablet (100 mcg) by mouth daily   melatonin 1 MG TABS tablet   No Yes   Sig: Take 1 tablet (1 mg) by mouth nightly as needed for sleep   psyllium (METAMUCIL) 28.3 % packet 12/10/2023 at am  Yes Yes   Sig: Take 1 packet by mouth daily   senna-docusate (SENOKOT-S/PERICOLACE) 8.6-50 MG tablet   No Yes   Sig: Take 1 tablet by mouth 2 times daily as needed for constipation      Facility-Administered Medications: None        Review of Systems    The 10 point Review of Systems is negative other than noted in the HPI or here.      Physical Exam   Vital Signs: Temp: 97.3  F (36.3  C) Temp src: Oral BP: 105/59 Pulse: 71   Resp: 15 SpO2: 96 % O2 Device: None  (Room air)    Weight: 140 lbs 0 oz    GEN: Alert and oriented. Not in acute distress.  HEENT: Atraumatic, mucous membrane- moist and pink.  Chest: Bilateral air entry.  CVS: S1S2 regular.   Abdomen: Soft. Non-tender, non-distended. No organomegaly. No guarding or rigidity. Bowel sounds active.   Extremities: No pedal edema. Right 3rd and 4th toes nail loss with dried blood  CNS: No involuntary movements.  Skin: no cyanosis or clubbing.     Medical Decision Making             Data

## 2023-12-11 NOTE — ED NOTES
"[facility] ED Handoff Report    ED Chief Complaint: weakness    ED Diagnosis:  (R53.1) Generalized weakness  Comment: freq falls  Plan: admission     (W19.XXXA) Fall, initial encounter  Comment:   Plan:        PMH:    Past Medical History:   Diagnosis Date    Anemia, unspecified     Created by Conversion     CKD (chronic kidney disease) stage 4, GFR 15-29 ml/min (H) 12/11/2015    Disease of thyroid gland     hypothyroidism    Diverticulitis of colon     Created by Conversion  Replacement Utility updated for latest IMO load    Eczema     Hydronephrosis     Hyperlipidemia     Hypertension     Hypothyroidism     Created by Conversion  Replacement Utility updated for latest IMO load    Osteoarthritis     Created by Conversion  Replacement Utility updated for latest IMO load    Skin cancer     Ureteral stone         Code Status:  Prior     Falls Risk: Yes Band: Applied    Current Living Situation/Residence: lives alone     Elimination Status: Continent: No     Activity Level: SBA w/ walker    Patients Preferred Language:  English     Needed: No    Vital Signs:  BP (!) 146/97   Pulse 77   Temp 98  F (36.7  C) (Oral)   Resp 17   Wt 63.5 kg (140 lb)   LMP  (LMP Unknown)   SpO2 95%   BMI 24.80 kg/m       Cardiac Rhythm: NSR    Pain Score: 0/10    Is the Patient Confused:  Yes    Last Food or Drink: 12/10/23 at 1700    Focused Assessment:  89 F to ED per medics from her own [facility]. Medics report that pt has called them frequently in the past few weeks. That the house is in disarray with \"stool on carpet\". She's had frequent falls and states usually amb with walker. Upon arrival to ED she is wearing 2 attends along with a lg peripad in her underpants, all urine soaked. Her soheila area is angry red , no breakdown noted. Barrier cream applied. Family at bedside. She receives M[facility] but declines any other services.She is alert / oriented to self and surroundings.    Tests Performed: Done: Labs and " Imaging    Treatments Provided:  antibiotic for UTI    Family Dynamics/Concerns: Yes; please explain: Family lives out of town    Family Updated On Visitor Policy: Yes    Plan of Care Communicated to Family: Yes    Who Was Updated about Plan of Care: Marylin Lechuga [phone]    Belongings Checklist Done and Signed by Patient: Yes    Medications sent with patient: NO  Covid: asymptomatic , test not done    Additional Information: NA    RN: Aziza Pham RN   12/11/2023 2:13 PM

## 2023-12-11 NOTE — ED TRIAGE NOTES
Arrival [facility] from home, c/o increased weakness. Lives alone has had multiple calls to ems in last weeks for weakness and ? Falls. None today.     Triage Assessment (Adult)       Row Name 12/11/23 0956          Triage Assessment    Airway WDL WDL        Respiratory WDL    Respiratory WDL WDL        Cardiac WDL    Cardiac Rhythm NSR

## 2023-12-11 NOTE — ED NOTES
"Pt assisted up out of bed using gait belt and walker for support. She was very shakey and \"scared\" when up. Required assist of 2. Tamra care done attends changed.  "

## 2023-12-11 NOTE — ED NOTES
Pt is wearing a double layer of attends, a soheila pad and underpants that are all saturated with urine. She has dried feces on her lower legs. Soheila area is angry red , no breakdown  noted. Cleansed and barrier cream applied. She is missing toenails on her r foot. She reports that she lives alone in a one level [facility]. She receives no in home services and has family in [place] and [place].That she has both a cane and a walker and has had frequent falls in the past few weeks.

## 2023-12-11 NOTE — PHARMACY-ADMISSION MEDICATION HISTORY
Pharmacist Admission Medication History    Admission medication history is complete. The information provided in this note is only as accurate as the sources available at the time of the update.    Information Source(s): Patient, Family member, Clinic records, and CareEverywhere/SureScripts via in-person    Pertinent Information: none    Changes made to PTA medication list:  Added: None  Deleted: None  Changed: None    Medication Affordability:       Allergies reviewed with patient and updates made in EHR: yes    Medication History Completed By: Aiden Armstrong [facility] 12/11/2023 10:37 AM    Prior to Admission medications    Medication Sig Last Dose Taking? Auth Provider Long Term End Date   acetaminophen (TYLENOL) 325 MG tablet Take 2 tablets (650 mg) by mouth every 6 hours as needed for mild pain or other (and adjunct with moderate or severe pain or per patient request)  Yes Geoffrey Ramirez PA-C     amLODIPine (NORVASC) 2.5 MG tablet Take 1 tablet (2.5 mg) by mouth daily 12/10/2023 at am Yes Torey Nichols MD Yes    aspirin 81 MG EC tablet Take 81 mg by mouth daily 12/10/2023 at am Yes Provider, Historical     calcium citrate-vitamin D (CITRACAL+D) 315-200 mg-unit per tablet Take 1 tablet by mouth 2 times daily With lunch and dinner 12/10/2023 at pm Yes Provider, Historical     cyanocobalamin 1000 MCG tablet [CYANOCOBALAMIN 1000 MCG TABLET] Take 1 tablet (1,000 mcg total) by mouth daily. 12/10/2023 at am Yes Lissy Oscar MD     folic acid (FOLVITE) 1 MG tablet [FOLIC ACID (FOLVITE) 1 MG TABLET] Take 1 tablet (1 mg total) by mouth daily. 12/10/2023 at am Yes Lissy Oscar MD     levothyroxine (SYNTHROID/LEVOTHROID) 100 MCG tablet Take 1 tablet (100 mcg) by mouth daily 12/10/2023 at am Yes Torey Nichols MD Yes    melatonin 1 MG TABS tablet Take 1 tablet (1 mg) by mouth nightly as needed for sleep  Yes Fran Kumar MD     psyllium (METAMUCIL) 28.3 % packet Take 1 packet by mouth daily  12/10/2023 at am Yes Reported, Patient     senna-docusate (SENOKOT-S/PERICOLACE) 8.6-50 MG tablet Take 1 tablet by mouth 2 times daily as needed for constipation  Yes Fran Kumar MD     ACE/ARB/ARNI NOT PRESCRIBED (INTENTIONAL) Please choose reason not prescribed from choices below.   Keri Yo MD Yes

## 2023-12-12 ENCOUNTER — APPOINTMENT (OUTPATIENT)
Dept: OCCUPATIONAL THERAPY | Facility: HOSPITAL | Age: 88
End: 2023-12-12
Attending: STUDENT IN AN ORGANIZED HEALTH CARE EDUCATION/TRAINING PROGRAM
Payer: MEDICARE

## 2023-12-12 ENCOUNTER — APPOINTMENT (OUTPATIENT)
Dept: PHYSICAL THERAPY | Facility: HOSPITAL | Age: 88
End: 2023-12-12
Attending: STUDENT IN AN ORGANIZED HEALTH CARE EDUCATION/TRAINING PROGRAM
Payer: MEDICARE

## 2023-12-12 LAB
ANION GAP SERPL CALCULATED.3IONS-SCNC: 7 MMOL/L (ref 7–15)
BASOPHILS # BLD AUTO: 0 10E3/UL (ref 0–0.2)
BASOPHILS NFR BLD AUTO: 1 %
BUN SERPL-MCNC: 35.1 MG/DL (ref 8–23)
CALCIUM SERPL-MCNC: 9.2 MG/DL (ref 8.2–9.6)
CHLORIDE SERPL-SCNC: 108 MMOL/L (ref 98–107)
CREAT SERPL-MCNC: 1.84 MG/DL (ref 0.51–0.95)
DEPRECATED HCO3 PLAS-SCNC: 26 MMOL/L (ref 22–29)
EGFRCR SERPLBLD CKD-EPI 2021: 25 ML/MIN/1.73M2
EOSINOPHIL # BLD AUTO: 0.1 10E3/UL (ref 0–0.7)
EOSINOPHIL NFR BLD AUTO: 2 %
ERYTHROCYTE [DISTWIDTH] IN BLOOD BY AUTOMATED COUNT: 12.9 % (ref 10–15)
GLUCOSE SERPL-MCNC: 86 MG/DL (ref 70–99)
HCT VFR BLD AUTO: 34.2 % (ref 35–47)
HGB BLD-MCNC: 10.6 G/DL (ref 11.7–15.7)
IMM GRANULOCYTES # BLD: 0 10E3/UL
IMM GRANULOCYTES NFR BLD: 0 %
LYMPHOCYTES # BLD AUTO: 0.7 10E3/UL (ref 0.8–5.3)
LYMPHOCYTES NFR BLD AUTO: 20 %
MCH RBC QN AUTO: 30.8 PG (ref 26.5–33)
MCHC RBC AUTO-ENTMCNC: 31 G/DL (ref 31.5–36.5)
MCV RBC AUTO: 99 FL (ref 78–100)
MONOCYTES # BLD AUTO: 0.4 10E3/UL (ref 0–1.3)
MONOCYTES NFR BLD AUTO: 11 %
NEUTROPHILS # BLD AUTO: 2.4 10E3/UL (ref 1.6–8.3)
NEUTROPHILS NFR BLD AUTO: 66 %
NRBC # BLD AUTO: 0 10E3/UL
NRBC BLD AUTO-RTO: 0 /100
PLATELET # BLD AUTO: 208 10E3/UL (ref 150–450)
POTASSIUM SERPL-SCNC: 5.2 MMOL/L (ref 3.4–5.3)
POTASSIUM SERPL-SCNC: 5.4 MMOL/L (ref 3.4–5.3)
RBC # BLD AUTO: 3.44 10E6/UL (ref 3.8–5.2)
SODIUM SERPL-SCNC: 141 MMOL/L (ref 135–145)
T4 FREE SERPL-MCNC: 0.84 NG/DL (ref 0.9–1.7)
TSH SERPL DL<=0.005 MIU/L-ACNC: 11.25 UIU/ML (ref 0.3–4.2)
WBC # BLD AUTO: 3.7 10E3/UL (ref 4–11)

## 2023-12-12 PROCEDURE — 36415 COLL VENOUS BLD VENIPUNCTURE: CPT | Performed by: INTERNAL MEDICINE

## 2023-12-12 PROCEDURE — 84439 ASSAY OF FREE THYROXINE: CPT | Performed by: INTERNAL MEDICINE

## 2023-12-12 PROCEDURE — 84132 ASSAY OF SERUM POTASSIUM: CPT | Performed by: INTERNAL MEDICINE

## 2023-12-12 PROCEDURE — 80048 BASIC METABOLIC PNL TOTAL CA: CPT | Performed by: STUDENT IN AN ORGANIZED HEALTH CARE EDUCATION/TRAINING PROGRAM

## 2023-12-12 PROCEDURE — 97110 THERAPEUTIC EXERCISES: CPT | Mod: GP

## 2023-12-12 PROCEDURE — G0378 HOSPITAL OBSERVATION PER HR: HCPCS

## 2023-12-12 PROCEDURE — 97535 SELF CARE MNGMENT TRAINING: CPT | Mod: GO

## 2023-12-12 PROCEDURE — 97161 PT EVAL LOW COMPLEX 20 MIN: CPT | Mod: GP

## 2023-12-12 PROCEDURE — 250N000013 HC RX MED GY IP 250 OP 250 PS 637: Performed by: STUDENT IN AN ORGANIZED HEALTH CARE EDUCATION/TRAINING PROGRAM

## 2023-12-12 PROCEDURE — 85025 COMPLETE CBC W/AUTO DIFF WBC: CPT | Performed by: STUDENT IN AN ORGANIZED HEALTH CARE EDUCATION/TRAINING PROGRAM

## 2023-12-12 PROCEDURE — 99232 SBSQ HOSP IP/OBS MODERATE 35: CPT | Performed by: INTERNAL MEDICINE

## 2023-12-12 PROCEDURE — 36415 COLL VENOUS BLD VENIPUNCTURE: CPT | Performed by: STUDENT IN AN ORGANIZED HEALTH CARE EDUCATION/TRAINING PROGRAM

## 2023-12-12 PROCEDURE — 97166 OT EVAL MOD COMPLEX 45 MIN: CPT | Mod: GO

## 2023-12-12 PROCEDURE — G0463 HOSPITAL OUTPT CLINIC VISIT: HCPCS

## 2023-12-12 PROCEDURE — 84443 ASSAY THYROID STIM HORMONE: CPT | Performed by: INTERNAL MEDICINE

## 2023-12-12 PROCEDURE — 97116 GAIT TRAINING THERAPY: CPT | Mod: GP

## 2023-12-12 PROCEDURE — 96376 TX/PRO/DX INJ SAME DRUG ADON: CPT

## 2023-12-12 PROCEDURE — 250N000011 HC RX IP 250 OP 636: Performed by: STUDENT IN AN ORGANIZED HEALTH CARE EDUCATION/TRAINING PROGRAM

## 2023-12-12 RX ADMIN — CEFTRIAXONE SODIUM 1 G: 1 INJECTION, POWDER, FOR SOLUTION INTRAMUSCULAR; INTRAVENOUS at 12:09

## 2023-12-12 RX ADMIN — AMLODIPINE BESYLATE 2.5 MG: 2.5 TABLET ORAL at 08:40

## 2023-12-12 RX ADMIN — LEVOTHYROXINE SODIUM 100 MCG: 100 TABLET ORAL at 08:40

## 2023-12-12 RX ADMIN — Medication 81 MG: at 08:39

## 2023-12-12 RX ADMIN — FOLIC ACID 1 MG: 1 TABLET ORAL at 08:39

## 2023-12-12 RX ADMIN — CYANOCOBALAMIN TAB 1000 MCG 1000 MCG: 1000 TAB at 08:40

## 2023-12-12 ASSESSMENT — ACTIVITIES OF DAILY LIVING (ADL)
ADLS_ACUITY_SCORE: 39
ADLS_ACUITY_SCORE: 37
ADLS_ACUITY_SCORE: 36
ADLS_ACUITY_SCORE: 39
ADLS_ACUITY_SCORE: 36
ADLS_ACUITY_SCORE: 37
ADLS_ACUITY_SCORE: 39
ADLS_ACUITY_SCORE: 36
ADLS_ACUITY_SCORE: 39
ADLS_ACUITY_SCORE: 39

## 2023-12-12 NOTE — PLAN OF CARE
Problem: Adult Inpatient Plan of Care  Goal: Absence of Hospital-Acquired Illness or Injury  Intervention: Prevent Skin Injury  Recent Flowsheet Documentation  Taken 12/12/2023 1200 by Francisco Javier Jonas RN  Body Position: position changed independently  Taken 12/12/2023 1100 by Francisco Javier Jonas RN  Body Position: position changed independently     Problem: Adult Inpatient Plan of Care  Goal: Absence of Hospital-Acquired Illness or Injury  Intervention: Identify and Manage Fall Risk  Recent Flowsheet Documentation  Taken 12/12/2023 1200 by Francisco Javier Jonas RN  Safety Promotion/Fall Prevention: activity supervised  Taken 12/12/2023 1100 by Francisco Javier Jonas RN  Safety Promotion/Fall Prevention: activity supervised   Goal Outcome Evaluation:    Major Shift Events:  Pt seen my WOC nurse for right toes, new orders received.     Plan: TCU placement pending. Continues POC.     For vital signs and complete assessments, please see documentation flowsheets.

## 2023-12-12 NOTE — PLAN OF CARE
Goal Outcome Evaluation:       PRIMARY DIAGNOSIS: GENERALIZED WEAKNESS, FALLS    OUTPATIENT/OBSERVATION GOALS TO BE MET BEFORE DISCHARGE  1. Orthostatic performed: No    2. Tolerating PO medications:  No PO meds given this shift    3. Return to near baseline physical activity: Yes    4. Cleared for discharge by consultants (if involved): No    Discharge Planner Nurse   Safe discharge environment identified: No  Barriers to discharge: Yes       Entered by: Hemalatha Cason RN 12/12/2023 6:45 AM     Please review provider order for any additional goals.   Nurse to notify provider when observation goals have been met and patient is ready for discharge.      Patient denying pain. Appears somewhat confused. Stated we are in [facility] and was unable to say exactly why she came in. Very pleasant.

## 2023-12-12 NOTE — PROGRESS NOTES
"   12/12/23 6419   Appointment Info   Signing Clinician's Name / Credentials (PT) Ally Casey, PT, DPT   Quick Adds   Quick Adds Certification   Living Environment   People in Home alone   Current Living Arrangements   (twin home)   Home Accessibility no concerns  (no stairs)   Self-Care   Equipment Currently Used at Home walker, rolling;cane, straight  (4WW)   Fall history within last six months yes   Number of times patient has fallen within last six months 5   Activity/Exercise/Self-Care Comment pt reports being independent at baseline   General Information   Onset of Illness/Injury or Date of Surgery 12/11/23   Referring Physician Elise Tompkins MD   Patient/Family Therapy Goals Statement (PT) Return to Home   Pertinent History of Current Problem (include personal factors and/or comorbidities that impact the POC) Per Chart Review -\"89 year old female admitted on 12/11/2023. She has PMHx of hypothyroidism, CKD IV presented for generalized weakness and multiple recent falls.  She lives by herself.  In the ED CT of the head and cervical spine were unremarkable.  Urinalysis was suggestive of UTI.\"   Existing Precautions/Restrictions fall   Range of Motion (ROM)   Range of Motion ROM deficits secondary to weakness   Strength (Manual Muscle Testing)   Strength (Manual Muscle Testing) Deficits observed during functional mobility   Transfers   Transfers sit-stand transfer   Sit-Stand Transfer   Sit-Stand [place] (Transfers) supervision;verbal cues;minimum assist (75% patient effort)   Gait/Stairs (Locomotion)   [place] Level (Gait) supervision;verbal cues;minimum assist (75% patient effort)   Assistive Device (Gait) walker, front-wheeled   Distance in Feet (Gait) 5   Pattern (Gait) step-through   Deviations/Abnormal Patterns (Gait) estefania decreased;gait speed decreased;base of support, narrow   Clinical Impression   Criteria for Skilled Therapeutic Intervention Yes, treatment indicated   PT Diagnosis " "(PT) Impaired Functional mobility   Influenced by the following impairments weakness, impaired balance, decreasead ROM   Functional limitations due to impairments gait, transfers   Clinical Presentation (PT Evaluation Complexity) stable   Clinical Presentation Rationale pt presents as medically diagnosed   Clinical Decision Making (Complexity) low complexity   Planned Therapy Interventions (PT) balance training;bed mobility training;gait training;home exercise program;stair training;strengthening;transfer training   Risk & Benefits of therapy have been explained evaluation/treatment results reviewed;patient   PT Total Evaluation Time   PT Eval, Low Complexity Minutes (68996) 10   Therapy Certification   Start of care date 12/12/23   Certification date from 12/12/23   Certification date to 12/19/23   Medical Diagnosis Per Chart Review -\"89 year old female admitted on 12/11/2023. She has PMHx of hypothyroidism, CKD IV presented for generalized weakness and multiple recent falls. She lives by herself. In the ED CT of the head and cervical spine were unremarkable. Urinalysis was suggestive of UTI.\"   Physical Therapy Goals   PT Frequency Daily   PT Predicted Duration/Target Date for Goal Attainment 12/19/23   PT Goals Transfers;Gait   PT: Transfers Modified independent;Sit to/from stand;Assistive device   PT: Gait Modified independent;Rolling walker;Greater than 200 feet   Interventions   Interventions Quick Adds Gait Training;Therapeutic Procedure   Therapeutic Procedure/Exercise   Ther. Procedure: strength, endurance, ROM, flexibillity Minutes (59022) 11   Symptoms Noted During/After Treatment fatigue   Treatment Detail/Skilled Intervention Seated LE therex: 8-10 reps, cueing for safety/technique/specific muscle activation, CGA   Gait Training   Gait Training Minutes (76092) 12   Symptoms Noted During/After Treatment (Gait Training) fatigue   Treatment Detail/Skilled Intervention Initial standing weight shifting & " "marching to prepare for gait with FWW; Gait: cueing for safety/FWW management/picking up feet/posture - narrow base of support noted with increased lateral weight shifting; seated rest break required d/t fatigue   Distance in Feet 150, 80   [place] Level (Gait Training) minimum assist (75% patient effort)   Physical Assistance Level (Gait Training) supervision;verbal cues   Weight Bearing (Gait Training) full weight-bearing   Assistive Device (Gait Training) rolling walker   Pattern Analysis (Gait Training) swing-through gait   Gait Analysis Deviations decreased estefania;decreased step length   Impairments (Gait Analysis/Training) balance impaired;pain;ROM decreased;strength decreased   PT Discharge Planning   PT Plan transfers, gait with FWW, LE strengthening   PT Discharge Recommendation (DC Rec) Transitional Care Facility   PT Rationale for DC Rec pt requires assist x1 for mobility & lives alone; TCU for improving activity tolerance/strength/balance   PT Brief overview of current status Min A for balance gait with  & 80; Min A sit to/from stand transfer   PT Equipment Needed at Discharge walker, rolling   Total Session Time   Timed Code Treatment Minutes 23   Total Session Time (sum of timed and untimed services) 33     [facility]  OUTPATIENT PHYSICAL THERAPY EVALUATION  PLAN OF TREATMENT FOR OUTPATIENT REHABILITATION  (COMPLETE FOR INITIAL CLAIMS ONLY)  Patient's Last Name, First Name, M.I.  YOB: 1931  Lindsey Bermudez                        Provider's Name  [facility] Medical Record No.  [id number]                             Onset Date:  12/11/23   Start of Care Date:  12/12/23   Type:     _X_PT   ___OT   ___SLP Medical Diagnosis:  Per Chart Review -\"89 year old female admitted on 12/11/2023. She has PMHx of hypothyroidism, CKD IV presented for generalized weakness and multiple recent falls. She lives by herself. In the " "ED CT of the head and cervical spine were unremarkable. Urinalysis was suggestive of UTI.\"              PT Diagnosis:  Impaired Functional mobility Visits from SOC:  1     See note for plan of treatment, functional goals and certification details    I CERTIFY THE NEED FOR THESE SERVICES FURNISHED UNDER        THIS PLAN OF TREATMENT AND WHILE UNDER MY CARE     (Physician co-signature of this document indicates review and certification of the therapy plan).             "

## 2023-12-12 NOTE — DISCHARGE INSTRUCTIONS
WOC DISCHARGE INSTRUCTIONS:  Bilateral feet  Cleanse both feet, dry well between toes  Apply lotion to feet, can use some between toes  Cut a long nonstick gauze in half lengthwise and weave between toes  Perform daily     Recommend Follow Up for wound care at  [facility]  [address], [address]  [place] [address]  T: [phone]     Surgeon or Hospitalist Following:  Ava Morse    Care Manager:  If surgeon is following wound, please coordinate with Hospitalist to ask surgeon if willing to sign off and allow clinic providers to follow.  Send referral with current WOC orders

## 2023-12-12 NOTE — PROGRESS NOTES
Goal Outcome Evaluation:  Pt has been moved to room 131. RN has been given report.

## 2023-12-12 NOTE — CONSULTS
[facility]  WOC Nurse Inpatient Assessment     Consulted for: R toes    Summary: patient sitting in chair, brother in law at bedside    Patient History (according to provider note(s):      Lindsey Bermudez is a 89 year old female admitted on 12/11/2023. She has PMHx of hypothyroidism, CKD IV presented for generalized weakness and multiple recent falls.  She lives by herself.  In the ED CT of the head and cervical spine were unremarkable.  Urinalysis was suggestive of UTI.  Started on ceftriaxone patient will be admitted for generalized weakness and a UTI     Assessment:      Skin Injury Location: R foot      12/12    Last photo: 12/12  Skin injury due to: Neuropathic  Skin history and plan of care:   patient is a poor historian but the 3rd and 4th toes are missing the nail. There is what looks like dried drainage between toes along with flaking skin.   Affected area:      Skin assessment: Dry/flaky and Dry drainage     Measurements (length x width x depth, in cm) NA     Color: normal and consistent with surrounding tissue     Temperature  normal      Drainage: scant .      Color: serosanguinous      Odor: none  Pain: absent, none  Pain interventions prior to dressing change: no significant pain present   Treatment goal: Increase moisture   STATUS: initial assessment  Supplies ordered: supplies stored on unit      Patient with very dry skin to feet, moisturized and  the toes.      Treatment Plan:     Bilateral feet  Cleanse both feet, dry well between toes  Apply lotion to feet, can use some between toes  Cut a long nonstick gauze in half lengthwise and weave between toes  Perform daily     Orders: Written    RECOMMEND PRIMARY TEAM ORDER: None, at this time  Education provided: importance of repositioning, plan of care, Moisture management, Hygiene, and Off-loading pressure  Discussed plan of care with: Patient, Family, and Nurse  WOC nurse follow-up plan: weekly  Notify WOC if  wound(s) deteriorate.  Nursing to notify the Provider(s) and re-consult the [facility] Nurse if new skin concern.    DATA:     Current support surface: Standard  Standard gel/foam mattress (IsoFlex, Atmos air, etc)  Containment of urine/stool: Incontinence Protocol  BMI: Body mass index is 24.95 kg/m .   Active diet order: Orders Placed This Encounter      2 Gram Sodium Diet     Output: No intake/output data recorded.     Labs:   Recent Labs   Lab 12/12/23  0652 12/11/23  1057   ALBUMIN  --  4.0   HGB 10.6* 11.4*   WBC 3.7* 4.1     Pressure injury risk assessment:   Sensory Perception: 3-->slightly limited  Moisture: 3-->occasionally moist  Activity: 3-->walks occasionally  Mobility: 3-->slightly limited  Nutrition: 3-->adequate  Friction and Shear: 2-->potential problem  Gentry Score: 17    LISSETH MontenegroN RN CWOCN  Pager no longer in use, please contact through [employer] group: [facility] [email or web address]

## 2023-12-12 NOTE — PROGRESS NOTES
"Care Management Follow Up    Length of Stay (days): 0    Expected Discharge Date: 12/14/2023     Concerns to be Addressed: IV antibiotic, TCU placement    Patient plan of care discussed at interdisciplinary rounds: Yes    Anticipated Discharge Disposition:  TCU     Anticipated Discharge Services:  TCU    Anticipated Discharge DME:      Patient/family educated on Medicare website which has current facility and service quality ratings:  yes  Education Provided on the Discharge Plan:  per care team  Patient/Family in Agreement with the Plan:  yes    Referrals Placed by CM/SW:  TCU    Private pay costs discussed: TCU cost pending facility    Additional Information:  Patient PMHx of hypothyroidism, CKD IV presented for generalized weakness and multiple recent falls.  She lives by herself.  In the ED CT of the head and cervical spine were unremarkable.  Urinalysis was suggestive of UTI.  Started on ceftriaxone patient will be admitted for generalized weakness and a UTI.    Therapy:  Min A for balance gait with  & 80; Min A sit to/from stand transfer, assist of 1 for ADLs, recommending TCU.      Social History:  Patient is alert, pleasant and forgetful. She cannot remember the events that brought her to the hospital today. She lives alone in her [facility], states that she is independent with her ADLs and a brother in law and his daughter, patients niece, assist with IADLs. Patient reports that she has had several fall over the past month and family is \"telling me I can't live by myself anymore\". She is sad about this \"I love my house\".      Per epic notes: 89 F to ED per medics from her own [facility]. Medics report that pt has called them frequently in the past few weeks. That the house is in disarray with \"stool on carpet\". She's had frequent falls and states usually amb with walker. Upon arrival to ED she is wearing 2 attends along with a lg peripad in her underpants, all urine soaked. Her soheila area is angry red , " no breakdown noted. Barrier cream applied. Family at bedside. She receives MOW but declines any other services.She is alert / oriented to self and surroundings.     Main family contact is niece Marylin Lechuga [phone]. Both niece and MARA-Ward live out of town. ([place] and [facility])  MARA to transport at discharge per patient.       12/12/23:  Met with patient and Ward to discuss recommendations for TCU. Reviewed private pay due to Observation status and not ACO reach. Provided TCU [facility]. Preferences provided and faxed.      3:04 PM  [facility] has LTC shared room available. Spoke with patient who said she needs to think about this but feels not ready to move out of her house. Spoke with Ward who states he feels she would not want a shared room and LTC.         Anne-Marie Gold RN

## 2023-12-12 NOTE — PLAN OF CARE
"PRIMARY DIAGNOSIS: \"GENERIC\" NURSING  OUTPATIENT/OBSERVATION GOALS TO BE MET BEFORE DISCHARGE:  ADLs back to baseline: No    Activity and level of assistance: Ax1 with belt and walker    Pain status: Denies    Return to near baseline physical activity: No     Discharge Planner Nurse   Safe discharge environment identified: Yes  Barriers to discharge:        Entered by: Rekha Lema RN 12/12/2023 6:34 AM     Please review provider order for any additional goals.   Nurse to notify provider when observation goals have been met and patient is ready for discharge.Goal Outcome Evaluation:         Pt alert and disoriented to time, VSS on room air. Denies pain. Slept between cares. Call light within reach, bed alarm on.                   "

## 2023-12-12 NOTE — CONSULTS
Care Management Initial Consult    General Information  Assessment completed with: Patient,    Type of CM/SW Visit: Initial Assessment    Primary Care Provider verified and updated as needed: Yes   Readmission within the last 30 days: no previous admission in last 30 days      Reason for Consult: discharge planning  Advance Care Planning: Advance Care Planning Reviewed: no concerns identified          Communication Assessment  Patient's communication style: spoken language (English or Bilingual)             Cognitive  Cognitive/Neuro/Behavioral:                        Living Environment:   People in home: alone     Current living Arrangements: house (Twin home)      Able to return to prior arrangements: other (see comments) (TBD)  Living Arrangement Comments: Lives alone    Family/Social Support:  Care provided by: self  Provides care for: no one  Marital Status: Single  Other (specify) (niece, MARA)          Description of Support System: Supportive, Involved         Current Resources:   Patient receiving home care services: No     Community Resources: Meals on Wheels  Equipment currently used at home:    Supplies currently used at home: Other (walker)    Employment/Financial:  Employment Status: retired        Financial Concerns:             Does the patient's insurance plan have a 3 day qualifying hospital stay waiver?  No    Lifestyle & Psychosocial Needs:  Social Determinants of Health     Food Insecurity: Not on file   Depression: Not at risk (11/8/2023)    PHQ-2     PHQ-2 Score: 0   Housing Stability: Not on file   Tobacco Use: Low Risk  (11/8/2023)    Patient History     Smoking Tobacco Use: Never     Smokeless Tobacco Use: Never     Passive Exposure: Not on file   Financial Resource Strain: Not on file   Alcohol Use: Not on file   Transportation Needs: Not on file   Physical Activity: Not on file   Interpersonal Safety: Not on file   Stress: Not on file   Social Connections: Not on file       Functional  "Status:  Prior to admission patient needed assistance:   Dependent ADLs:: Independent, Ambulation-walker  Dependent IADLs:: Shopping, Transportation, Cooking  Assesssment of Functional Status: Not at  functional baseline    Mental Health Status:          Chemical Dependency Status:                Values/Beliefs:  Spiritual, Cultural Beliefs, Gnosticist Practices, Values that affect care:                 Additional Information:  Met with patient at bedside for initial assessment. Introduced self and care management role. Patient is alert, pleasant and forgetful. She cannot remember the events that brought her to the hospital today. [facility], states that she is independent with her ADLs and a brother in law and his daughter, patients niece, assist with IADLs. Patient reports that she has had several fall over the past month and family is \"telling me I can't live by myself anymore\". She is sad about this \"I love my house\".     Per epic notes: 89 F to ED per medics from her own [facility]. Medics report that pt has called them frequently in the past few weeks. That the house is in disarray with \"stool on carpet\". She's had frequent falls and states usually amb with walker. Upon arrival to ED she is wearing 2 attends along with a lg peripad in her underpants, all urine soaked. Her soheila area is angry red , no breakdown noted. Barrier cream applied. Family at bedside. She receives MOW but declines any other services.She is alert / oriented to self and surroundings.    HERNANDEZ discussed.  PT/OT/wound consults pending. Patient reports she has never been to a TCU in the past nor has had any home care services. CM to follow hospital progression, treatment team recommendations and assist with discharge planning.   Main family contact is flakita Coulter Ankush [phone]. Both niece and MARA live out of town. ([place] and [facility])  MARA to transport at discharge per patient.     Anna Huston RN     "

## 2023-12-12 NOTE — PROGRESS NOTES
[facility]    Medicine Progress Note - Hospitalist Service    Date of Admission:  12/11/2023    Assessment & Plan     Lindsey Bermudez is a 89 year old female admitted on 12/11/2023. She has PMHx of hypothyroidism, CKD IV presented for generalized weakness and multiple recent falls.  She lives by herself.  In the ED CT of the head and cervical spine were unremarkable.  Urinalysis was suggestive of UTI.       Generalized weakness  Frequent falls  CT head and cervical spine unremarkable  Patient lives alone  - PT OT: recommend TCU     UTI  UA suggestive of UTI  - Continue ceftriaxone  - Follow urine culture    Hyperkalemia: potassium 5.4 today. Monitor.      Right toe wound  -- No s/o infection  -- wound care consult     HTN: Continue PTA amlodipine     CKD IV: Creatinine at her baseline.  -- Avoid nephrotoxic meds  -- Monitor BMP     Hypothyroidism  -- c/w PTA levothyroxine. Check TSH.       Observation Goals: -diagnostic tests and consults completed and resulted, -vital signs normal or at patient baseline, Nurse to notify provider when observation goals have been met and patient is ready for discharge.  Diet: 2 Gram Sodium Diet    DVT Prophylaxis: Low Risk/Ambulatory with no VTE prophylaxis indicated  Jones Catheter: Not present  Lines: None     Cardiac Monitoring: None  Code Status: No CPR- Do NOT Intubate      Clinically Significant Risk Factors Present on Admission        # Hyperkalemia: Highest K = 5.4 mmol/L in last 2 days, will monitor as appropriate         # Drug Induced Platelet Defect: home medication list includes an antiplatelet medication   # Hypertension: Noted on problem list                 Disposition Plan      Expected Discharge Date: 12/14/2023    Discharge Delays: OT Disposition recs needed  Lab Result Pending (enter specific test & time in comments)  IV Medication - consider oral or Home Infusion  PT Disposition recs needed  Specialist Consult (enter specialist &  decision needed in comments)  Destination: home with help/services;inpatient rehabilitation facility              Ava Morse MD  Hospitalist Service  [facility]  Securely message with [employer] (more info)  Text page via [employer] Paging/Directory   ______________________________________________________________________    Interval History   Patient reports no urinary symptoms. She feels sad if she needs to move out of her house. But she feels that is the right thing to do and for her best interest.     Physical Exam   Vital Signs: Temp: 97.9  F (36.6  C) Temp src: Oral BP: 137/70 Pulse: 80   Resp: 18 SpO2: 96 % O2 Device: None (Room air)    Weight: 140 lbs 13.98 oz    General appearance: not in acute distress  HEENT: PERRL, EOMI  Lungs: Clear breath sounds in bilateral lung fields  Cardiovascular: Regular rate and rhythm, normal S1-S2  Abdomen: Soft, non tender, no distension  Musculoskeletal: No joint swelling  Skin: No rash and no edema  Neurology: AAO ×3.  Cranial nerves II - XII normal.  Normal muscle strength in all four extremities.     Medical Decision Making       45 MINUTES SPENT BY ME on the date of service doing chart review, history, exam, documentation & further activities per the note.      Data     I have personally reviewed the following data over the past 24 hrs:    3.7 (L)  \   10.6 (L)   / 208     141 108 (H) 35.1 (H) /  86   5.4 (H) 26 1.84 (H) \       Imaging results reviewed over the past 24 hrs:   No results found for this or any previous visit (from the past 24 hour(s)).

## 2023-12-12 NOTE — PLAN OF CARE
PRIMARY DIAGNOSIS: GENERALIZED WEAKNESS    OUTPATIENT/OBSERVATION GOALS TO BE MET BEFORE DISCHARGE  1. Orthostatic performed: N/A    2. Tolerating PO medications: Yes    3. Return to near baseline physical activity: No    4. Cleared for discharge by consultants (if involved): No    Discharge Planner Nurse   Safe discharge environment identified: No  Barriers to discharge: Yes       Entered by: Анна Navarro RN 12/12/2023 10:54 AM     Please review provider order for any additional goals.   Nurse to notify provider when observation goals have been met and patient is ready for discharge.Goal Outcome Evaluation:       Alert needs assist of one and walker when up PT OT   Following needs TCU at discharge.

## 2023-12-12 NOTE — PLAN OF CARE
PRIMARY DIAGNOSIS: Fall  OUTPATIENT/OBSERVATION GOALS TO BE MET BEFORE DISCHARGE:  ADLs back to baseline: No    Activity and level of assistance: Up with Ax1 walker and gaitbelt    Pain status: Left foot tenderness, declines medication    Return to near baseline physical activity: No       Discharge Planner Nurse   Safe discharge environment identified: Yes  Barriers to discharge:          Entered by: Rekha Lema RN 12/12/2023 4:26 AM     Please review provider order for any additioal goals.   Nurse to notify provider when observation goals have been met and patient is ready for discharge.Goal Outcome Evaluation:         Pt alert and disoriented to time, VSS on room air. Having left heel tenderness, declines pain medication. Up with assist x1 to bathroom. Slept between cares.

## 2023-12-12 NOTE — PROGRESS NOTES
[facility]  OUTPATIENT OCCUPATIONAL THERAPY  EVALUATION  PLAN OF TREATMENT FOR OUTPATIENT REHABILITATION  (COMPLETE FOR INITIAL CLAIMS ONLY)  Patient's Last Name, First Name, M.I.  YOB: 1931  Lindsey Bermudez                          Provider's Name  [facility] Medical Record No.  [id number]                             Onset Date:  12/11/23   Start of Care Date:  12/12/23   Type:     ___PT   _X_OT   ___SLP Medical Diagnosis:  fall                    OT Diagnosis:  decreased ADLs Visits from SOC:  1     See note for plan of treatment, functional goals and certification details    I CERTIFY THE NEED FOR THESE SERVICES FURNISHED UNDER        THIS PLAN OF TREATMENT AND WHILE UNDER MY CARE     (Physician co-signature of this document indicates review and certification of the therapy plan).                               12/12/23 1120   Appointment Info   Signing Clinician's Name / Credentials (OT) Mary Reyes,OTR/L   Quick Adds   Quick Adds Certification   Living Environment   People in Home alone   Current Living Arrangements house  ([facility])   Home Accessibility no concerns   Transportation Anticipated family or friend will provide   Self-Care   Current Activity Tolerance fair   Equipment Currently Used at Home walker, rolling;cane, straight  (4WW,)   Fall history within last six months yes   Number of times patient has fallen within last six months 5   Activity/Exercise/Self-Care Comment reports being indep. at baseline   Instrumental Activities of Daily Living (IADL)   IADL Comments reports indep. drsg, bathing, cooking, laundry, does not drive   General Information   Onset of Illness/Injury or Date of Surgery 12/11/23   Referring Physician Dr. DILAN Tompkins   Patient/Family Therapy Goal Statement (OT) none stated   Additional Occupational Profile Info/Pertinent History of Current Problem Lindsey Bermudez is a 89 year old female admitted on  "12/11/2023. She has PMHx of hypothyroidism, CKD IV presented for generalized weakness and multiple recent falls.   Existing Precautions/Restrictions fall   Limitations/Impairments safety/cognitive   Cognitive Status Examination   Orientation Status time;person  (\"care center\" for place)   Cognitive Status Comments rec.screening   Visual Perception   Visual Impairment/Limitations other (see comments)  (no c/o vision issues)   Range of Motion Comprehensive   General Range of Motion no range of motion deficits identified   Strength Comprehensive (MMT)   General Manual Muscle Testing (MMT) Assessment   (generalized weakness)   Coordination   Upper Extremity Coordination No deficits were identified   Bed Mobility   Bed Mobility supine-sit;sit-supine   Supine-Sit Garfield (Bed Mobility) supervision   Sit-Supine Garfield (Bed Mobility) supervision   Transfers   Transfers toilet transfer;sit-stand transfer;bed-chair transfer   Transfer Skill: Bed to Chair/Chair to Bed   Bed-Chair Garfield (Transfers) minimum assist (75% patient effort)   Sit-Stand Transfer   Sit-Stand Garfield (Transfers) minimum assist (75% patient effort)   Toilet Transfer   Type (Toilet Transfer) sit-stand;stand-sit   Garfield Level (Toilet Transfer) minimum assist (75% patient effort)   Balance   Balance Assessment standing dynamic balance   Balance Comments fair to poor balance   Activities of Daily Living   BADL Assessment/Intervention toileting;grooming;lower body dressing   Lower Body Dressing Assessment/Training   Garfield Level (Lower Body Dressing) maximum assist (25% patient effort)   Grooming Assessment/Training   Garfield Level (Grooming) minimum assist (75% patient effort)   Toileting   Garfield Level (Toileting) maximum assist (25% patient effort)   Clinical Impression   Criteria for Skilled Therapeutic Interventions Met (OT) Yes, treatment indicated   OT Diagnosis decreased ADLs   OT Problem List-Impairments " impacting ADL balance;cognition;mobility;strength   Assessment of Occupational Performance 3-5 Performance Deficits   Identified Performance Deficits trsfs, drsg, g/h,toileting,  cognition for ADLs,   Planned Therapy Interventions (OT) ADL retraining;strengthening;transfer training;cognition   Clinical Decision Making Complexity (OT) detailed assessment/moderate complexity   Risk & Benefits of therapy have been explained evaluation/treatment results reviewed;care plan/treatment goals reviewed;patient   OT Total Evaluation Time   OT Eval, Moderate Complexity Minutes (93924) 8   Therapy Certification   Medical Diagnosis fall   Start of Care Date 12/12/23   Certification date from 12/12/23   Certification date to 12/19/23   OT Goals   Therapy Frequency (OT) Daily   OT Predicted Duration/Target Date for Goal Attainment 12/19/23   OT Goals Hygiene/Grooming;Transfers;Cognition;Toilet Transfer/Toileting   OT: Hygiene/Grooming supervision/stand-by assist;while standing   OT: Transfer Supervision/stand-by assist;with assistive device   OT: Toilet Transfer/Toileting Minimal assist   OT: Cognitive Patient/caregiver will verbalize understanding of cognitive assessment results/recommendations as needed for safe discharge planning   Interventions   Interventions Quick Adds Self-Care/Home Management   Self-Care/Home Management   Self-Care/Home Mgmt/ADL, Compensatory, Meal Prep Minutes (10370) 15   Symptoms Noted During/After Treatment (Meal Preparation/Planning Training) none   Treatment Detail/Skilled Intervention pt in chair, teary/feeling overwhelmed, pt offered reassurance throughout session, trsf from chair>toilet>bed w/ min A w/ FWW, cues for hand placement, unsteady on feet, STS trials form EOB x5 w/ CGA/Rigo w/ cues for controlled landing,   Stokes Level (Grooming Training) minimum assist (75% patient effort)  (unsteady at sink)   Assistance (Grooming Training) set-up required;supervision;verbal cues   Lower Body  Dressing Training Assistance other (see comments)  (SBA doff socks, mod A don, fig.4 technque)   Lower Body Dressing Training Assistance set-up required;supervision;verbal cues   [place] Level (Toilet Training) other (see comments)  (maxA to pull down brief, min A to pull up,)   Assistance (Toilet Training) set-up required;supervision;verbal cues;1 person assist   Toilet Training Assistance - Assistive Device wall grab bar   OT Discharge Planning   OT Plan SLUMS, trsfs, g/h, toileting, drsg   OT Discharge Recommendation (DC Rec) Transitional Care Facility   OT Rationale for DC Rec Ax1 for trsfs and ADLs, rec. TCU to improve balance for ADLs and reduce fall risk, pt demo decreased cognition rec 24/7 supervision   OT Brief overview of current status min A g/h, Rigo trsfs, confusion   Total Session Time   Timed Code Treatment Minutes 15   Total Session Time (sum of timed and untimed services) 23

## 2023-12-13 ENCOUNTER — APPOINTMENT (OUTPATIENT)
Dept: OCCUPATIONAL THERAPY | Facility: HOSPITAL | Age: 88
End: 2023-12-13
Payer: MEDICARE

## 2023-12-13 PROCEDURE — 250N000013 HC RX MED GY IP 250 OP 250 PS 637: Performed by: STUDENT IN AN ORGANIZED HEALTH CARE EDUCATION/TRAINING PROGRAM

## 2023-12-13 PROCEDURE — 250N000013 HC RX MED GY IP 250 OP 250 PS 637: Performed by: INTERNAL MEDICINE

## 2023-12-13 PROCEDURE — G0378 HOSPITAL OBSERVATION PER HR: HCPCS

## 2023-12-13 PROCEDURE — 99232 SBSQ HOSP IP/OBS MODERATE 35: CPT | Performed by: INTERNAL MEDICINE

## 2023-12-13 PROCEDURE — 97129 THER IVNTJ 1ST 15 MIN: CPT | Mod: GO

## 2023-12-13 PROCEDURE — 97535 SELF CARE MNGMENT TRAINING: CPT | Mod: GO

## 2023-12-13 RX ORDER — HALOPERIDOL 5 MG/ML
0.5 INJECTION INTRAMUSCULAR ONCE
Status: DISCONTINUED | OUTPATIENT
Start: 2023-12-13 | End: 2023-12-13 | Stop reason: ALTCHOICE

## 2023-12-13 RX ORDER — HALOPERIDOL 5 MG/ML
0.5 INJECTION INTRAMUSCULAR ONCE
Status: COMPLETED | OUTPATIENT
Start: 2023-12-13 | End: 2023-12-13

## 2023-12-13 RX ORDER — CEFDINIR 300 MG/1
300 CAPSULE ORAL DAILY
Status: COMPLETED | OUTPATIENT
Start: 2023-12-13 | End: 2023-12-15

## 2023-12-13 RX ADMIN — LEVOTHYROXINE SODIUM 100 MCG: 100 TABLET ORAL at 07:58

## 2023-12-13 RX ADMIN — AMLODIPINE BESYLATE 2.5 MG: 2.5 TABLET ORAL at 07:58

## 2023-12-13 RX ADMIN — FOLIC ACID 1 MG: 1 TABLET ORAL at 07:58

## 2023-12-13 RX ADMIN — Medication 81 MG: at 07:58

## 2023-12-13 RX ADMIN — PSYLLIUM HUSK 1 PACKET: 3.4 POWDER ORAL at 07:59

## 2023-12-13 RX ADMIN — CYANOCOBALAMIN TAB 1000 MCG 1000 MCG: 1000 TAB at 07:58

## 2023-12-13 RX ADMIN — CEFDINIR 300 MG: 300 CAPSULE ORAL at 09:43

## 2023-12-13 ASSESSMENT — ACTIVITIES OF DAILY LIVING (ADL)
ADLS_ACUITY_SCORE: 38
ADLS_ACUITY_SCORE: 36
ADLS_ACUITY_SCORE: 38
ADLS_ACUITY_SCORE: 39
ADLS_ACUITY_SCORE: 38

## 2023-12-13 NOTE — PLAN OF CARE
Notified MD at 0011 AM regarding change in condition - Pt is agitated, confused, and setting alarm off. Unable to redirect. No prn to give and she pulled out her IV. Can she have anything?    Spoke with: Dr. Rodriguez    Orders were obtained. See orders for haldol IV.    Comments: Haldol changed to IM d/t not having IV access.

## 2023-12-13 NOTE — PROGRESS NOTES
"PRIMARY DIAGNOSIS: \"GENERIC\" NURSING  OUTPATIENT/OBSERVATION GOALS TO BE MET BEFORE DISCHARGE:  ADLs back to baseline: No    Activity and level of assistance: Up with standby assistance.    Pain status: Pain free.    Return to near baseline physical activity: No     Discharge Planner Nurse   Safe discharge environment identified: No  Barriers to discharge: Yes       Entered by: Gabe Jonas RN 12/13/2023 11:50 AM     Please review provider order for any additional goals.   Nurse to notify provider when observation goals have been met and patient is ready for discharge.  "

## 2023-12-13 NOTE — UTILIZATION REVIEW
Concurrent stay review; Secondary Review Determination - [facility]        Under the authority of the Utilization Management Committee, the utilization review process indicated a secondary review on the above patient.  The review outcome is based on review of the medical records, discussions with staff, and applying clinical experience noted on the date of the review.        (x) Observation/outpatient Status Appropriate - Concurrent stay review       RATIONALE FOR DETERMINATION:   Lindsey Bermudez is a 89 yr old female who presented with weakness with confusion and UTI.  IV abx now PO.  Awaiting safe disposition.  Did have some behavior issues overnight requiring IV haldol x 1 but appears stable today.     Patient delayed discharge is related to disposition, there is no medical necessity for inpatient admission at the time of this review. If there is a change in patient status, please resend for review.    The information on this document is developed by the utilization review team in order for the business office to ensure compliance.  This only denotes the appropriateness of proper admission status and does not reflect the quality of care rendered.       The definitions of Inpatient Status and Observation Status used in making the determination above are those provided in the CMS Coverage Manual, Chapter 1 and Chapter 6, section 70.4.       Sincerely,    Laura Anguiano MD

## 2023-12-13 NOTE — PLAN OF CARE
Goal Outcome Evaluation:  Problem: Adult Inpatient Plan of Care  Goal: Plan of Care Review  Outcome: Progressing  Goal: Patient-Specific Goal (Individualized)  Outcome: Progressing  Goal: Absence of Hospital-Acquired Illness or Injury  Outcome: Progressing  Goal: Optimal Comfort and Wellbeing  Outcome: Progressing  Goal: Readiness for Transition of Care  Outcome: Progressing

## 2023-12-13 NOTE — PROGRESS NOTES
PRIMARY DIAGNOSIS: Fall, Weakness  OUTPATIENT/OBSERVATION GOALS TO BE MET BEFORE DISCHARGE:  ADLs back to baseline: Yes    Activity and level of assistance: Up with standby assistance.    Pain status: Pain free.    Return to near baseline physical activity: Yes     Discharge Planner Nurse   Safe discharge environment identified: No  Barriers to discharge: Yes    Entered by: Sheila Powers RN 12/12/2023 9:30 PM     Please review provider order for any additional goals.   Nurse to notify provider when observation goals have been met and patient is ready for discharge.

## 2023-12-13 NOTE — PROGRESS NOTES
PRIMARY DIAGNOSIS: Fall, GENERALIZED WEAKNESS    OUTPATIENT/OBSERVATION GOALS TO BE MET BEFORE DISCHARGE  1. Orthostatic performed: N/A    2. Tolerating PO medications: Yes    3. Return to near baseline physical activity: Yes    4. Cleared for discharge by consultants (if involved): Yes    Discharge Planner Nurse   Safe discharge environment identified: No  Barriers to discharge: Yes    Entered by: Sheila Powers RN 12/12/2023 9:28 PM   Please review provider order for any additional goals.   Nurse to notify provider when observation goals have been met and patient is ready for discharge.

## 2023-12-13 NOTE — PROGRESS NOTES
[facility]    Medicine Progress Note - Hospitalist Service    Date of Admission:  12/11/2023    Assessment & Plan     Lindsey Bermudez is a 89 year old female admitted on 12/11/2023. She has PMHx of hypothyroidism, CKD IV presented for generalized weakness and multiple recent falls.  She lives by herself.  In the ED CT of the head and cervical spine were unremarkable.  Urinalysis was suggestive of UTI.       Generalized weakness  Frequent falls  CT head and cervical spine unremarkable  Patient lives alone, but no longer able to care for herself.  - PT OT  - Plan LTC placement     UTI: UA suggestive of UTI. Preliminary result shows E coli.   - Received ceftriaxone for 2 days. Switch to Cefdinir today. Follow urine culture sensitivity result.     Hyperkalemia: potassium 5.4. Now returned to normal range.      Right toe wound  -- No s/o infection  -- wound care consult     HTN: Continue PTA amlodipine     CKD IV: Creatinine at her baseline.  -- Avoid nephrotoxic meds  -- Monitor BMP     Hypothyroidism: TSH elevated and free T4 low. Likely due to medication noncompliance.   -- c/w PTA levothyroxine. Check TSH in one month.       Observation Goals: -diagnostic tests and consults completed and resulted, -vital signs normal or at patient baseline, Nurse to notify provider when observation goals have been met and patient is ready for discharge.  Diet: 2 Gram Sodium Diet    DVT Prophylaxis: Low Risk/Ambulatory with no VTE prophylaxis indicated  Jones Catheter: Not present  Lines: None     Cardiac Monitoring: None  Code Status: No CPR- Do NOT Intubate      Clinically Significant Risk Factors Present on Admission        # Hyperkalemia: Highest K = 5.4 mmol/L in last 2 days, will monitor as appropriate         # Drug Induced Platelet Defect: home medication list includes an antiplatelet medication   # Hypertension: Noted on problem list                 Disposition Plan      Expected Discharge Date:  12/14/2023    Discharge Delays: OT Disposition recs needed  Lab Result Pending (enter specific test & time in comments)  IV Medication - consider oral or Home Infusion  PT Disposition recs needed  Specialist Consult (enter specialist & decision needed in comments)  Destination: home with help/services;inpatient rehabilitation facility  Discharge Comments: LTC placement            Ava Morse MD  Hospitalist Service  [facility]  Securely message with [employer] (more info)  Text page via [employer] Paging/Directory   ______________________________________________________________________    Interval History   Patient reports no urinary symptoms. She feels sad today when thinking about moving out of her private home and move into the LTC.     Physical Exam   Vital Signs: Temp: 97.9  F (36.6  C) Temp src: Oral BP: 128/69 Pulse: 68   Resp: 16 SpO2: 97 % O2 Device: None (Room air)    Weight: 140 lbs 13.98 oz    General appearance: not in acute distress  HEENT: PERRL, EOMI  Lungs: Clear breath sounds in bilateral lung fields  Cardiovascular: Regular rate and rhythm, normal S1-S2  Abdomen: Soft, non tender, no distension  Musculoskeletal: No joint swelling  Skin: No rash and no edema  Neurology: AAO ×3.  Cranial nerves II - XII normal.  Normal muscle strength in all four extremities.     Medical Decision Making       45 MINUTES SPENT BY ME on the date of service doing chart review, history, exam, documentation & further activities per the note.      Data     I have personally reviewed the following data over the past 24 hrs:    N/A  \   N/A   / N/A     N/A N/A N/A /  N/A   5.2 N/A N/A \     TSH: N/A T4: N/A A1C: N/A       Imaging results reviewed over the past 24 hrs:   No results found for this or any previous visit (from the past 24 hour(s)).

## 2023-12-13 NOTE — PROGRESS NOTES
"PRIMARY DIAGNOSIS: \"GENERIC\" NURSING  OUTPATIENT/OBSERVATION GOALS TO BE MET BEFORE DISCHARGE:  ADLs back to baseline: No    Activity and level of assistance: Up with standby assistance.    Pain status: Pain free.    Return to near baseline physical activity: No     Discharge Planner Nurse   Safe discharge environment identified: No  Barriers to discharge: Yes       Entered by: Gabe Jonas RN 12/13/2023 9:21 AM     Please review provider order for any additional goals.   Nurse to notify provider when observation goals have been met and patient is ready for discharge.  "

## 2023-12-13 NOTE — PROGRESS NOTES
Pt disoriented to place and situation this evening. Thought she was at home, and unaware that she had fallen and was taken to the hospital. Able to be re-oriented and is aware that she is confused.     Denies pain. Up with assist of one and a walker. VSS. Will continue to monitor.

## 2023-12-13 NOTE — PLAN OF CARE
"GPRIMARY DIAGNOSIS: \"GENERIC\" NURSING  OUTPATIENT/OBSERVATION GOALS TO BE MET BEFORE DISCHARGE:  ADLs back to baseline: No    Activity and level of assistance: assist of 1 with walker and gait belt    Pain status: Pain free.    Return to near baseline physical activity: No     Discharge Planner Nurse   Safe discharge environment identified: No  Barriers to discharge: Yes       Entered by: Hanna De Santiago RN 12/13/2023 8:08 AM     Please review provider order for any additional goals.   Nurse to notify provider when observation goals have been met and patient is ready for discharge.    Pt has been sleeping.   "

## 2023-12-13 NOTE — PLAN OF CARE
"PRIMARY DIAGNOSIS: \"GENERIC\" NURSING  OUTPATIENT/OBSERVATION GOALS TO BE MET BEFORE DISCHARGE:  ADLs back to baseline: No    Activity and level of assistance: assist of 1 with walker and gait belt    Pain status: Pain free.    Return to near baseline physical activity: No     Discharge Planner Nurse   Safe discharge environment identified: Yes  Barriers to discharge: No       Entered by: Hanna De Santiago RN 12/13/2023 8:07 AM     Please review provider order for any additional goals.   Nurse to notify provider when observation goals have been met and patient is ready for discharge.    Pt was agitated, confused, and setting alarms off. Nursing staff attempted to redirect pt with no success and found pt's IV at bedside. Pt yelling at staff, what are you doing in my house? Get out of here! I'm calling the police if you don't leave! Nursing staff instructed pt that she was in the hospital and there are nursing staff here to help her. Redirection not successful. No prn's to give at the time. However, by the time Haldol IV was received and changed to IM d/t pt not having IV access, pt was already sleeping, therefore, IM not given. Continue to monitor.   "

## 2023-12-13 NOTE — PROGRESS NOTES
"PRIMARY DIAGNOSIS: \"GENERIC\" NURSING  OUTPATIENT/OBSERVATION GOALS TO BE MET BEFORE DISCHARGE:  ADLs back to baseline: No    Activity and level of assistance: Up with standby assistance.    Pain status: Pain free.    Return to near baseline physical activity: No     Discharge Planner Nurse   Safe discharge environment identified: No  Barriers to discharge: Yes       Entered by: Gabe Jonas RN 12/13/2023 5:01 PM     Please review provider order for any additional goals.   Nurse to notify provider when observation goals have been met and patient is ready for discharge.  "

## 2023-12-13 NOTE — PROGRESS NOTES
"Care Management Follow Up    Length of Stay (days): 0    Expected Discharge Date: 12/14/2023       Concerns to be Addressed: IV antibiotic, LTC placement     Patient plan of care discussed at interdisciplinary rounds: Yes     Anticipated Discharge Disposition:  LTC     Anticipated Discharge Services:  TCU     Anticipated Discharge DME:       Patient/family educated on Medicare website which has current facility and service quality ratings:  yes  Education Provided on the Discharge Plan:  per care team  Patient/Family in Agreement with the Plan:  yes     Referrals Placed by CM/SW:  TCU     Private pay costs discussed: TCU cost pending facility     Additional Information:  Patient PMHx of hypothyroidism, CKD IV presented for generalized weakness and multiple recent falls.  She lives by herself.  In the ED CT of the head and cervical spine were unremarkable.  Urinalysis was suggestive of UTI.  Started on ceftriaxone patient will be admitted for generalized weakness and a UTI.     Therapy:  Min A for balance gait with  & 80; Min A sit to/from stand transfer, assist of 1 for ADLs, recommending TCU. SLUM score 13.        Social History:  Patient is alert, pleasant and forgetful. She cannot remember the events that brought her to the hospital today. She lives alone in her [place], states that she is independent with her ADLs and a brother in law and his daughter, patients niece, assist with IADLs. Patient reports that she has had several fall over the past month and family is \"telling me I can't live by myself anymore\". She is sad about this \"I love my house\".      Per epic notes: 89 F to ED per medics from her own [facility]. Medics report that pt has called them frequently in the past few weeks. That the house is in disarray with \"stool on carpet\". She's had frequent falls and states usually amb with walker. Upon arrival to ED she is wearing 2 attends along with a lg peripad in her underpants, all urine soaked. Her " soheila area is angry red , no breakdown noted. Barrier cream applied. Family at bedside. She receives MOW but declines any other services.She is alert / oriented to self and surroundings.     Main family contact is flakita Lechuga [phone]. Both niece and MARA-Ward live out of town. ([place] [facility])  MARA to transport at discharge per patient.         12/13/23:    Spoke with patient, Ward and Marylin.  Seeking LTC with private room. Will need PT/OT.  Discussed various options for discharge: LTC, prison, memory care, home with private pay. Decision is for LTC in private room; ideally close to them in either [place] or [place]. Two referrals faxed.     3:38 PM  Patient states she really would like to return home with private pay. With SLUM score at 13 she would need 24hr care. This will need to be discussed further with family to see if this can be an option.          Anne-Marie Gold RN

## 2023-12-14 ENCOUNTER — APPOINTMENT (OUTPATIENT)
Dept: OCCUPATIONAL THERAPY | Facility: HOSPITAL | Age: 88
End: 2023-12-14
Payer: MEDICARE

## 2023-12-14 ENCOUNTER — APPOINTMENT (OUTPATIENT)
Dept: PHYSICAL THERAPY | Facility: HOSPITAL | Age: 88
End: 2023-12-14
Payer: MEDICARE

## 2023-12-14 LAB
ATRIAL RATE - MUSE: 60 BPM
DIASTOLIC BLOOD PRESSURE - MUSE: 81 MMHG
INTERPRETATION ECG - MUSE: NORMAL
P AXIS - MUSE: 10 DEGREES
PR INTERVAL - MUSE: 156 MS
QRS DURATION - MUSE: 92 MS
QT - MUSE: 436 MS
QTC - MUSE: 436 MS
R AXIS - MUSE: -20 DEGREES
SYSTOLIC BLOOD PRESSURE - MUSE: 170 MMHG
T AXIS - MUSE: 16 DEGREES
VENTRICULAR RATE- MUSE: 60 BPM

## 2023-12-14 PROCEDURE — 97110 THERAPEUTIC EXERCISES: CPT | Mod: GP

## 2023-12-14 PROCEDURE — 250N000013 HC RX MED GY IP 250 OP 250 PS 637: Performed by: STUDENT IN AN ORGANIZED HEALTH CARE EDUCATION/TRAINING PROGRAM

## 2023-12-14 PROCEDURE — G0378 HOSPITAL OBSERVATION PER HR: HCPCS

## 2023-12-14 PROCEDURE — 97535 SELF CARE MNGMENT TRAINING: CPT | Mod: GO

## 2023-12-14 PROCEDURE — 250N000013 HC RX MED GY IP 250 OP 250 PS 637: Performed by: INTERNAL MEDICINE

## 2023-12-14 PROCEDURE — 97116 GAIT TRAINING THERAPY: CPT | Mod: GP

## 2023-12-14 PROCEDURE — 99232 SBSQ HOSP IP/OBS MODERATE 35: CPT | Performed by: INTERNAL MEDICINE

## 2023-12-14 RX ADMIN — PSYLLIUM HUSK 1 PACKET: 3.4 POWDER ORAL at 08:13

## 2023-12-14 RX ADMIN — CYANOCOBALAMIN TAB 1000 MCG 1000 MCG: 1000 TAB at 08:09

## 2023-12-14 RX ADMIN — FOLIC ACID 1 MG: 1 TABLET ORAL at 08:09

## 2023-12-14 RX ADMIN — LEVOTHYROXINE SODIUM 100 MCG: 100 TABLET ORAL at 08:09

## 2023-12-14 RX ADMIN — CEFDINIR 300 MG: 300 CAPSULE ORAL at 08:09

## 2023-12-14 RX ADMIN — ACETAMINOPHEN 650 MG: 325 TABLET ORAL at 20:30

## 2023-12-14 RX ADMIN — AMLODIPINE BESYLATE 2.5 MG: 2.5 TABLET ORAL at 08:09

## 2023-12-14 RX ADMIN — ACETAMINOPHEN 650 MG: 325 TABLET ORAL at 08:13

## 2023-12-14 RX ADMIN — SENNOSIDES AND DOCUSATE SODIUM 1 TABLET: 8.6; 5 TABLET ORAL at 20:31

## 2023-12-14 RX ADMIN — Medication 81 MG: at 08:08

## 2023-12-14 ASSESSMENT — ACTIVITIES OF DAILY LIVING (ADL)
ADLS_ACUITY_SCORE: 35
ADLS_ACUITY_SCORE: 35
ADLS_ACUITY_SCORE: 39
ADLS_ACUITY_SCORE: 35
ADLS_ACUITY_SCORE: 44
ADLS_ACUITY_SCORE: 39
ADLS_ACUITY_SCORE: 35
ADLS_ACUITY_SCORE: 40
ADLS_ACUITY_SCORE: 44

## 2023-12-14 NOTE — PLAN OF CARE
"PRIMARY DIAGNOSIS: \"GENERIC\" NURSING  OUTPATIENT/OBSERVATION GOALS TO BE MET BEFORE DISCHARGE:  ADLs back to baseline: No    Activity and level of assistance: Up with standby assistance.    Pain status: Pain free.    Return to near baseline physical activity: No     Discharge Planner Nurse   Safe discharge environment identified: Yes  Barriers to discharge: Yes       Entered by: Yves Betancourt RN 12/14/2023 12:51 AM     Please review provider order for any additional goals.   Nurse to notify provider when observation goals have been met and patient is ready for discharge.Goal Outcome Evaluation:                        "

## 2023-12-14 NOTE — PROGRESS NOTES
[facility]    Medicine Progress Note - Hospitalist Service    Date of Admission:  12/11/2023    Assessment & Plan     Lindsey Bermudez is a 89 year old female admitted on 12/11/2023. She has PMHx of hypothyroidism, CKD IV presented for generalized weakness and multiple recent falls.  She lives by herself.  In the ED CT of the head and cervical spine were unremarkable.  Urinalysis was suggestive of UTI.       Generalized weakness  Frequent falls  CT head and cervical spine unremarkable  Patient lives alone, but no longer able to care for herself.  - PT OT  - Plan LTC placement     UTI: UA suggestive of UTI. Preliminary result shows  pansensitive E coli and pseudomonas.   - Received ceftriaxone for 2 days. Switched to Cefdinir 12/13. Plan to complete antibiotics for 5 days.     Hyperkalemia: potassium 5.4. Now returned to normal range.      Right toe wound  -- No s/o infection  -- wound care consult     HTN: Continue PTA amlodipine     CKD IV: Creatinine at her baseline.  -- Avoid nephrotoxic meds  -- Monitor BMP     Hypothyroidism: TSH elevated and free T4 low. Likely due to medication noncompliance.   -- c/w PTA levothyroxine. Check TSH in one month.       Observation Goals: -diagnostic tests and consults completed and resulted, -vital signs normal or at patient baseline, Nurse to notify provider when observation goals have been met and patient is ready for discharge.  Diet: 2 Gram Sodium Diet    DVT Prophylaxis: Low Risk/Ambulatory with no VTE prophylaxis indicated  Jones Catheter: Not present  Lines: None     Cardiac Monitoring: None  Code Status: No CPR- Do NOT Intubate      Clinically Significant Risk Factors Present on Admission                # Drug Induced Platelet Defect: home medication list includes an antiplatelet medication   # Hypertension: Noted on problem list                 Disposition Plan     Expected Discharge Date: 12/18/2023    Discharge Delays: Placement -  LTC  Destination: home with help/services;inpatient rehabilitation facility  Discharge Comments: LTC placement            Ava Morse MD  Hospitalist Service  [facility]  Securely message with [employer] (more info)  Text page via [employer] Paging/Directory   ______________________________________________________________________    Interval History   Patient reports no urinary symptoms. She is not able to recognize this provider and recall  that I have been seeing her everyday. She again feels sad about moving out of her residence and moving into the LTC.    Physical Exam   Vital Signs: Temp: 98.3  F (36.8  C) Temp src: Oral BP: 97/51 Pulse: 65   Resp: 18 SpO2: 95 % O2 Device: None (Room air)    Weight: 133 lbs 8 oz    General appearance: not in acute distress  HEENT: PERRL, EOMI  Lungs: Clear breath sounds in bilateral lung fields  Cardiovascular: Regular rate and rhythm, normal S1-S2  Abdomen: Soft, non tender, no distension  Musculoskeletal: No joint swelling  Skin: No rash and no edema  Neurology: AAO ×3.  Cranial nerves II - XII normal.  Normal muscle strength in all four extremities.     Medical Decision Making       35 MINUTES SPENT BY ME on the date of service doing chart review, history, exam, documentation & further activities per the note.      Data         Imaging results reviewed over the past 24 hrs:   No results found for this or any previous visit (from the past 24 hour(s)).

## 2023-12-14 NOTE — PLAN OF CARE
"PRIMARY DIAGNOSIS: \"GENERIC\" NURSING  OUTPATIENT/OBSERVATION GOALS TO BE MET BEFORE DISCHARGE:  ADLs back to baseline: No    Activity and level of assistance: Up with standby assistance.    Pain status: Pain free.    Return to near baseline physical activity: No     Discharge Planner Nurse   Safe discharge environment identified: Yes  Barriers to discharge: Yes       Entered by: Yves Betancourt RN 12/14/2023 4:49 AM     Please review provider order for any additional goals.   Nurse to notify provider when observation goals have been met and patient is ready for discharge.Goal Outcome Evaluation:  Pt slept well overnight. No events.                       "

## 2023-12-14 NOTE — PLAN OF CARE
PRIMARY DIAGNOSIS: GENERALIZED WEAKNESS    OUTPATIENT/OBSERVATION GOALS TO BE MET BEFORE DISCHARGE  1. Orthostatic performed: N/A    2. Tolerating PO medications: Yes    3. Return to near baseline physical activity:  Unsteady gait.     4. Cleared for discharge by consultants (if involved): No    Discharge Planner Nurse   Safe discharge environment identified: No  Barriers to discharge: TCU PLACEMENT       Entered by: Enid Rico RN 12/14/2023 12:24 PM     Please review provider order for any additional goals.   Nurse to notify provider when observation goals have been met and patient is ready for discharge.Goal Outcome Evaluation:

## 2023-12-14 NOTE — PROGRESS NOTES
Call out to [facility] to check on bed availability, [phone]..    [facility] has no beds.    Trying [facility] and [facility] facilities in [place]. Trying [facility] in [place].  PAS needed.

## 2023-12-14 NOTE — PLAN OF CARE
PRIMARY DIAGNOSIS: GENERALIZED WEAKNESS    OUTPATIENT/OBSERVATION GOALS TO BE MET BEFORE DISCHARGE  1. Orthostatic performed: N/A    2. Tolerating PO medications: Yes      3. Return to near baseline physical activity: No Weak unsteady gait. Assist of (1) using a rolling walker.PT/OT scheduled.    4. Cleared for discharge by consultants (if involved): No    Discharge Planner Nurse   Safe discharge environment identified: NO  Barriers to discharge:  TCU placement.        Entered by: Enid Rico RN 12/14/2023 8:22 AM   Complains of bilateral shoulder discomfort radiates down her back. Tylenol 650mg administered. Up in chair with pillow support  Please review provider order for any additional goals.   Nurse to notify provider when observation goals have been met and patient is ready for discharge.Goal Outcome Evaluation:

## 2023-12-14 NOTE — PROGRESS NOTES
"PRIMARY DIAGNOSIS: \"GENERIC\" NURSING  OUTPATIENT/OBSERVATION GOALS TO BE MET BEFORE DISCHARGE:  ADLs back to baseline: Yes    Activity and level of assistance: Up with standby assistance.    Pain status: Pain free.    Return to near baseline physical activity: Yes     Discharge Planner Nurse   Safe discharge environment identified: No  Barriers to discharge: Yes       Entered by: Alisha Kaufman RN 12/13/2023 9:21 PM         Please review provider order for any additional goals.   Nurse to notify provider when observation goals have been met and patient is ready for discharge.  "

## 2023-12-15 VITALS
SYSTOLIC BLOOD PRESSURE: 109 MMHG | TEMPERATURE: 97.5 F | BODY MASS INDEX: 23.65 KG/M2 | OXYGEN SATURATION: 95 % | RESPIRATION RATE: 16 BRPM | HEART RATE: 64 BPM | WEIGHT: 133.5 LBS | DIASTOLIC BLOOD PRESSURE: 56 MMHG

## 2023-12-15 LAB
BACTERIA UR CULT: ABNORMAL
BACTERIA UR CULT: ABNORMAL

## 2023-12-15 PROCEDURE — 99239 HOSP IP/OBS DSCHRG MGMT >30: CPT | Performed by: INTERNAL MEDICINE

## 2023-12-15 PROCEDURE — 250N000013 HC RX MED GY IP 250 OP 250 PS 637: Performed by: INTERNAL MEDICINE

## 2023-12-15 PROCEDURE — 250N000013 HC RX MED GY IP 250 OP 250 PS 637: Performed by: STUDENT IN AN ORGANIZED HEALTH CARE EDUCATION/TRAINING PROGRAM

## 2023-12-15 PROCEDURE — G0378 HOSPITAL OBSERVATION PER HR: HCPCS

## 2023-12-15 RX ADMIN — CEFDINIR 300 MG: 300 CAPSULE ORAL at 09:14

## 2023-12-15 RX ADMIN — AMLODIPINE BESYLATE 2.5 MG: 2.5 TABLET ORAL at 09:14

## 2023-12-15 RX ADMIN — ACETAMINOPHEN 650 MG: 325 TABLET ORAL at 09:25

## 2023-12-15 RX ADMIN — Medication 81 MG: at 09:14

## 2023-12-15 RX ADMIN — CYANOCOBALAMIN TAB 1000 MCG 1000 MCG: 1000 TAB at 09:14

## 2023-12-15 RX ADMIN — LEVOTHYROXINE SODIUM 100 MCG: 100 TABLET ORAL at 09:14

## 2023-12-15 RX ADMIN — PSYLLIUM HUSK 1 PACKET: 3.4 POWDER ORAL at 09:14

## 2023-12-15 RX ADMIN — FOLIC ACID 1 MG: 1 TABLET ORAL at 09:14

## 2023-12-15 ASSESSMENT — ACTIVITIES OF DAILY LIVING (ADL)
ADLS_ACUITY_SCORE: 40

## 2023-12-15 NOTE — PROGRESS NOTES
PRIMARY DIAGNOSIS: GENERALIZED WEAKNESS    OUTPATIENT/OBSERVATION GOALS TO BE MET BEFORE DISCHARGE  1. Orthostatic performed: N/A    2. Tolerating PO medications: Yes    3. Return to near baseline physical activity: Yes    4. Cleared for discharge by consultants (if involved): No    Discharge Planner Nurse   Safe discharge environment identified: No  Barriers to discharge: Yes       Entered by: Emma Fish RN 12/14/2023 7:40 PM

## 2023-12-15 NOTE — PROGRESS NOTES
PRIMARY DIAGNOSIS: GENERALIZED WEAKNESS    OUTPATIENT/OBSERVATION GOALS TO BE MET BEFORE DISCHARGE  1. Orthostatic performed: N/A    2. Tolerating PO medications: Yes    3. Return to near baseline physical activity: No    4. Cleared for discharge by consultants (if involved): N/A    Discharge Planner Nurse   Safe discharge environment identified: No  Barriers to discharge: No       Entered by: Zeo Crow RN 12/15/2023 1:30 PM     Please review provider order for any additional goals.   Nurse to notify provider when observation goals have been met and patient is ready for discharge.

## 2023-12-15 NOTE — PLAN OF CARE
Goal Outcome Evaluation:         PRIMARY DIAGNOSIS: GENERALIZED WEAKNESS    OUTPATIENT/OBSERVATION GOALS TO BE MET BEFORE DISCHARGE  1. Orthostatic performed: N/A    2. Tolerating PO medications: Yes    3. Return to near baseline physical activity: Yes    4. Cleared for discharge by consultants (if involved): No    Discharge Planner Nurse   Safe discharge environment identified: Yes  Barriers to discharge: No       Entered by: Porsha Dewitt RN 12/15/2023 3:20 AM     Please review provider order for any additional goals.   Nurse to notify provider when observation goals have been met and patient is ready for discharge.       A&Ox2. Disoriented to time/situation. Denies pain. PO Omnicef. Pt coccyx reddened, encouraged to shift overnight q2hrs. Slept well. Falls risk, bed alarm activated for pt safety. Call light within reach. Looking for SNF near [facility]. Will continue plan of care.     /69 (BP Location: Left arm)   Pulse 65   Temp 97.9  F (36.6  C) (Oral)   Resp 16   Wt 60.6 kg (133 lb 8 oz)   LMP  (LMP Unknown)   SpO2 94%   BMI 23.65 kg/m

## 2023-12-15 NOTE — PROGRESS NOTES
"PRIMARY DIAGNOSIS: \"GENERIC\" NURSING  OUTPATIENT/OBSERVATION GOALS TO BE MET BEFORE DISCHARGE:  ADLs back to baseline: No    Activity and level of assistance: Up with standby assistance.    Pain status: Improved-controlled with oral pain medications.    Return to near baseline physical activity: No     Discharge Planner Nurse   Safe discharge environment identified: No  Barriers to discharge: Yes       Entered by: Emma Fish RN 12/14/2023 6:50 PM       "

## 2023-12-15 NOTE — PLAN OF CARE
PRIMARY DIAGNOSIS: GENERALIZED WEAKNESS    OUTPATIENT/OBSERVATION GOALS TO BE MET BEFORE DISCHARGE  1. Orthostatic performed: No    2. Tolerating PO medications: Yes    3. Return to near baseline physical activity: Yes    4. Cleared for discharge by consultants (if involved): No    Discharge Planner Nurse   Safe discharge environment identified: Yes  Barriers to discharge: Yes       Entered by: Porsha Dewitt RN 12/15/2023 7:31 AM     Please review provider order for any additional goals.   Nurse to notify provider when observation goals have been met and patient is ready for discharge.Goal Outcome Evaluation:

## 2023-12-15 NOTE — PLAN OF CARE
Occupational Therapy Discharge Summary    Reason for therapy discharge:    Discharged to long term care facility.    Progress towards therapy goal(s). See goals on Care Plan in [facility] electronic health record for goal details.  Goals met    Therapy recommendation(s):    No further therapy is recommended.

## 2023-12-15 NOTE — PROGRESS NOTES
Care Management Discharge Note    Discharge Date: 12/15/2023       Discharge Disposition: long term care NH    Discharge Services:  Nursing home    Discharge DME:      Discharge Transportation: family or friend will provide    Private pay costs discussed: private room/amenity fees    Does the patient's insurance plan have a 3 day qualifying hospital stay waiver?  No    PAS Confirmation Code: [id number]  Patient/family educated on Medicare website which has current facility and service quality ratings:  yes    Education Provided on the Discharge Plan:  yes  Persons Notified of Discharge Plans: patient, niece, Shilpa  Patient/Family in Agreement with the Plan:  patient is reluctantly agreeing    Handoff Referral Completed: Yes    Additional Information:  Patient and niece met with [facility] to discuss private home care options and cost. Patient would need 24/7 live in help which will take the agency several weeks to find help.Niece with communicate with [facility] once they have made a decision. Patient accepted at [facility] in [place] for today. Niece will transport. Orders faxed. PAS done.    CHRIS Winslow

## 2023-12-15 NOTE — PLAN OF CARE
Physical Therapy Discharge Summary    Reason for therapy discharge:    Discharged to long term care facility.    Progress towards therapy goal(s). See goals on Care Plan in [facility] electronic health record for goal details.  Goals not met.  Barriers to achieving goals:   discharge from facility. And the pt was working on them as well.     Therapy recommendation(s):    Continued therapy is recommended.  Rationale/Recommendations:  To improve mobility and strength. .

## 2023-12-15 NOTE — PROGRESS NOTES
PRIMARY DIAGNOSIS: GENERALIZED WEAKNESS    OUTPATIENT/OBSERVATION GOALS TO BE MET BEFORE DISCHARGE  1. Orthostatic performed: N/A    2. Tolerating PO medications: Yes    3. Return to near baseline physical activity: Yes    4. Cleared for discharge by consultants (if involved): No    Discharge Planner Nurse   Safe discharge environment identified: No  Barriers to discharge: Yes       Entered by: Emma Fish RN 12/14/2023 10:23 PM     Pt is alert, disoriented to time and situation.  Pt has mild aches and pains, managed by one dose of prn tylenol.      Pt eating and drinking well. Last BM documented was 12/11, administered prn senna. One assist with a walker and gait belt. Pt has blanchable redness on sacrum, encouraged repositioning, spent most of shift up in chair.  Pt does not often call, rounded frequently.     Emma Fish RN.

## 2023-12-15 NOTE — PROGRESS NOTES
Expand All Collapse All    PRIMARY DIAGNOSIS: GENERALIZED WEAKNESS     OUTPATIENT/OBSERVATION GOALS TO BE MET BEFORE DISCHARGE  1. Orthostatic performed: N/A     2. Tolerating PO medications: Yes     3. Return to near baseline physical activity: No     4. Cleared for discharge by consultants (if involved): N/A        Discharge Planner Nurse   Safe discharge environment identified: Yes  Barriers to discharge: No       Entered by: Zoe Crow RN 12/15/2023 1:30 PM        Please review provider order for any additional goals.   Nurse to notify provider when observation goals have been met and patient is ready for discharge.

## 2023-12-15 NOTE — DISCHARGE SUMMARY
[facility]  Hospitalist Discharge Summary      Date of Admission:  12/11/2023  Date of Discharge:  12/15/2023  Discharging Provider: Ava Morse MD  Discharge Service: Hospitalist Service    Discharge Diagnoses     Principal Problem:    UTI (urinary tract infection)  Active Problems:    Essential hypertension    Primary hypothyroidism    CKD (chronic kidney disease) stage 4, GFR 15-29 ml/min (H)    Generalized weakness    Fall, initial encounter       Clinically Significant Risk Factors          Follow-ups Needed After Discharge   Follow-up Appointments     Follow Up and recommended labs and tests      Follow up with Nursing home physician in 1-2 weeks.  No follow up labs or   test are needed.          Discharge Disposition   Discharged to LTC  Condition at discharge: Stable    Hospital Course     Lindsey Bermudez is a 89 year old female admitted on 12/11/2023. She has a medical history of hypertension, hypothyroidism and stage IV CKD. She presented for generalized weakness and multiple falls. In the ED, CT scan of the head and cervical spine were unremarkable.  Urinalysis was suggestive of UTI. Urine culture grew pansensitive E coli and pseudomonas. Patient received Ceftriaxone and Cefdinir for a total of 5 days. Patient lives by herself. Per PT/OT recommendation, patient needs LTC placement. Patient and her family are agreeable. She was discharged to LTC in a stable condition on 12/15/23.     Consultations This Hospital Stay   PHYSICAL THERAPY ADULT IP CONSULT  CARE MANAGEMENT / SOCIAL WORK IP CONSULT  OCCUPATIONAL THERAPY ADULT IP CONSULT  WOUND OSTOMY CONTINENCE NURSE  IP CONSULT  OCCUPATIONAL THERAPY ADULT IP CONSULT  PHYSICAL THERAPY ADULT IP CONSULT  OCCUPATIONAL THERAPY ADULT IP CONSULT    Code Status   No CPR- Do NOT Intubate    Time Spent on this Encounter   I, Ava Morse MD, personally saw the patient today and spent greater than 30 minutes discharging this patient.        Ava Morse MD  [place] [address]  [address] [facility] [address]  Phone: [phone]  Fax: [phone]  ______________________________________________________________________    Physical Exam   Vital Signs: Temp: 97.5  F (36.4  C) Temp src: Oral BP: 109/56 Pulse: 64   Resp: 16 SpO2: 95 % O2 Device: None (Room air)    Weight: 133 lbs 8 oz    General appearance: not in acute distress  HEENT: PERRL, EOMI  Lungs: Clear breath sounds in bilateral lung fields  Cardiovascular: Regular rate and rhythm, normal S1-S2  Abdomen: Soft, non tender, no distension  Musculoskeletal: No joint swelling  Skin: No rash and no edema  Neurology: AAO ×3.  Cranial nerves II - XII normal.  Normal muscle strength in all four extremities.       Primary Care Physician   Torey Nichols    Discharge Orders      General info for SNF    Length of Stay Estimate: Long Term Care  Condition at Discharge: Improving  Level of care:board and care  Rehabilitation Potential: Good  Admission H&P remains valid and up-to-date: Yes  Recent Chemotherapy: N/A  Use Nursing Home Standing Orders: Yes     Mantoux instructions    Give two-step Mantoux (PPD) Per Facility Policy Yes     Follow Up and recommended labs and tests    Follow up with Nursing home physician in 1-2 weeks.  No follow up labs or test are needed.     Reason for your hospital stay    Generalized weakness and frequent falls.     Activity - Up with nursing assistance     Physical Therapy Adult Consult    Evaluate and treat as clinically indicated.    Reason:  Generalized weakness     Occupational Therapy Adult Consult    Evaluate and treat as clinically indicated.    Reason:  Generalized weakness     Fall precautions     Diet    Follow this diet upon discharge: Orders Placed This Encounter      2 Gram Sodium Diet       Significant Results and Procedures   Most Recent 3 CBC's:  Recent Labs   Lab Test 12/12/23  0652 12/11/23  1057 06/22/22  1211   WBC 3.7*  4.1 4.3   HGB 10.6* 11.4* 10.6*   MCV 99 98 94    205 192     Most Recent 3 BMP's:  Recent Labs   Lab Test 12/12/23  1818 12/12/23  0652 12/11/23  1057 10/05/22  1501   NA  --  141 143 141   POTASSIUM 5.2 5.4* 4.9 5.4*   CHLORIDE  --  108* 109* 106   CO2  --  26 24 25   BUN  --  35.1* 37.5* 39.7*   CR  --  1.84* 1.68* 2.03*   ANIONGAP  --  7 10 10   CHAS  --  9.2 9.1 9.3   GLC  --  86 89 94       EXAM: CT HEAD W/O CONTRAST  LOCATION: [facility]  DATE: 12/11/2023     INDICATION: Head injury  COMPARISON: None.  TECHNIQUE: Routine CT Head without IV contrast. Multiplanar reformats. Dose reduction techniques were used.     FINDINGS:  INTRACRANIAL CONTENTS: No intracranial hemorrhage, extraaxial collection, or mass effect.  No CT evidence of acute infarct. Mild presumed chronic small vessel ischemic changes. Mild to moderate generalized volume loss. No hydrocephalus.      VISUALIZED ORBITS/SINUSES/MASTOIDS: Prior bilateral cataract surgery. Visualized portions of the orbits are otherwise unremarkable. No paranasal sinus mucosal disease. No middle ear or mastoid effusion.     BONES/SOFT TISSUES: No acute abnormality.                                                                      IMPRESSION:  1.  No CT evidence for acute intracranial process.  2.  Brain atrophy and presumed chronic microvascular ischemic changes as above.      EXAM: CT CERVICAL SPINE W/O CONTRAST  LOCATION: [facility]  DATE: 12/11/2023     INDICATION: Neck pain.  COMPARISON: CT cervical spine without contrast 11/01/2023.  TECHNIQUE: Routine CT Cervical Spine without IV contrast. Multiplanar reformats. Dose reduction techniques were used.     FINDINGS:  VERTEBRA: Osteopenia. Cervical vertebral body heights are maintained. Grade 1 anterolisthesis of C3 on C4. No acute cervical spine fracture. Chronic compression fractures of the T2 and T3 vertebral bodies.     CANAL/FORAMINA: Mild bilateral neural  foraminal stenosis at C6-C7.     PARASPINAL: No extraspinal abnormality.                                                                      IMPRESSION:  1.  No acute cervical spine fracture.      Discharge Medications   Current Discharge Medication List        CONTINUE these medications which have NOT CHANGED    Details   acetaminophen (TYLENOL) 325 MG tablet Take 2 tablets (650 mg) by mouth every 6 hours as needed for mild pain or other (and adjunct with moderate or severe pain or per patient request)    Associated Diagnoses: Acute cholecystitis      amLODIPine (NORVASC) 2.5 MG tablet Take 1 tablet (2.5 mg) by mouth daily  Qty: 90 tablet, Refills: 1    Associated Diagnoses: Essential hypertension      aspirin 81 MG EC tablet Take 81 mg by mouth daily      calcium citrate-vitamin D (CITRACAL+D) 315-200 mg-unit per tablet Take 1 tablet by mouth 2 times daily With lunch and dinner      cyanocobalamin 1000 MCG tablet [CYANOCOBALAMIN 1000 MCG TABLET] Take 1 tablet (1,000 mcg total) by mouth daily.  Refills: 0    Associated Diagnoses: Vitamin B12 deficiency (non anemic)      folic acid (FOLVITE) 1 MG tablet [FOLIC ACID (FOLVITE) 1 MG TABLET] Take 1 tablet (1 mg total) by mouth daily.  Qty: 30 tablet, Refills: 0    Associated Diagnoses: Vitamin B12 deficiency (non anemic)      levothyroxine (SYNTHROID/LEVOTHROID) 100 MCG tablet Take 1 tablet (100 mcg) by mouth daily  Qty: 90 tablet, Refills: 0    Comments: Due for a recheck and be seen in Primary care. This is a bridge fill  Associated Diagnoses: Primary hypothyroidism      melatonin 1 MG TABS tablet Take 1 tablet (1 mg) by mouth nightly as needed for sleep    Associated Diagnoses: Insomnia, unspecified type      psyllium (METAMUCIL) 28.3 % packet Take 1 packet by mouth daily      senna-docusate (SENOKOT-S/PERICOLACE) 8.6-50 MG tablet Take 1 tablet by mouth 2 times daily as needed for constipation    Associated Diagnoses: Acute cholecystitis           STOP taking these  medications       ACE/ARB/ARNI NOT PRESCRIBED (INTENTIONAL) Comments:   Reason for Stopping:             Allergies   Allergies   Allergen Reactions    Adhesive Tape-Silicones [Adhesive Tape] Unknown     Blisters with some bandage like products

## 2023-12-21 PROBLEM — N39.0 UTI (URINARY TRACT INFECTION): Status: ACTIVE | Noted: 2023-12-21

## 2024-03-04 DIAGNOSIS — I10 ESSENTIAL HYPERTENSION: ICD-10-CM

## 2024-03-04 RX ORDER — AMLODIPINE BESYLATE 2.5 MG/1
2.5 TABLET ORAL DAILY
Qty: 90 TABLET | Refills: 1 | Status: SHIPPED | OUTPATIENT
Start: 2024-03-04

## (undated) DEVICE — DRSG TEGADERM 2 3/8X2 3/4" 1624W

## (undated) DEVICE — CLIP LIGACLIP LG YELLOW LT400

## (undated) DEVICE — GLOVE BIOGEL PI ULTRATOUCH G SZ 8.0 42180

## (undated) DEVICE — SOL RINGERS LACTATED 1000ML BAG 2B2324X

## (undated) DEVICE — ENDO TROCAR FIRST ENTRY KII FIOS Z-THRD 11X100MM CTF33

## (undated) DEVICE — SU VICRYL+ 0 27 UR6 VLT VCP603H

## (undated) DEVICE — BLADE KNIFE SURG 11 371111

## (undated) DEVICE — SYSTEM CLEARIFY VISUALIZATION 21-345

## (undated) DEVICE — GOWN XXL 9575

## (undated) DEVICE — DRSG TEGADERM 2 1/2X 2 3/4"

## (undated) DEVICE — ENDO TROCAR SLEEVE KII Z-THREADED 05X100MM CTS02

## (undated) DEVICE — DRSG TELFA 3X4" 1050

## (undated) DEVICE — Device

## (undated) DEVICE — SU MONOCRYL+ 4-0 18IN PS2 UND MCP496G

## (undated) DEVICE — ENDO SHEARS RENEW LAP ENDOCUT SCISSOR TIP 16.5MM 3142

## (undated) DEVICE — CLIP APPLIER ENDO ROTATING 10MM MED/LG ER320

## (undated) DEVICE — PLATE GROUNDING ADULT W/CORD 9165L

## (undated) DEVICE — SUCTION MANIFOLD NEPTUNE 2 SYS 1 PORT 702-025-000

## (undated) DEVICE — SOL WATER IRRIG 1000ML BOTTLE 2F7114

## (undated) DEVICE — TUBING SMOKE EVAC PNEUMOCLEAR HIGH FLOW 0620050250

## (undated) DEVICE — ESU CORD MONOPOLAR 10'  E0510

## (undated) DEVICE — TUBING LAP SUCT/IRRIG STRYKER 250070500

## (undated) DEVICE — DECANTER VIAL 2006S

## (undated) DEVICE — ENDO POUCH UNIV RETRIEVAL SYSTEM INZII 10MM CD001

## (undated) DEVICE — CUSTOM PACK LAP CHOLE SBA5BLCHEA

## (undated) DEVICE — PREP DURAPREP 26ML APL 8630

## (undated) DEVICE — ENDO TROCAR FIRST ENTRY KII FIOS Z-THRD 05X100MM CTF03

## (undated) RX ORDER — BUPIVACAINE HYDROCHLORIDE 2.5 MG/ML
INJECTION, SOLUTION INFILTRATION; PERINEURAL
Status: DISPENSED
Start: 2022-02-16

## (undated) RX ORDER — FENTANYL CITRATE 50 UG/ML
INJECTION, SOLUTION INTRAMUSCULAR; INTRAVENOUS
Status: DISPENSED
Start: 2022-02-16

## (undated) RX ORDER — FENTANYL CITRATE-0.9 % NACL/PF 10 MCG/ML
PLASTIC BAG, INJECTION (ML) INTRAVENOUS
Status: DISPENSED
Start: 2022-02-16

## (undated) RX ORDER — ONDANSETRON 2 MG/ML
INJECTION INTRAMUSCULAR; INTRAVENOUS
Status: DISPENSED
Start: 2022-02-16

## (undated) RX ORDER — PROPOFOL 10 MG/ML
INJECTION, EMULSION INTRAVENOUS
Status: DISPENSED
Start: 2022-02-16

## (undated) RX ORDER — DEXAMETHASONE SODIUM PHOSPHATE 10 MG/ML
INJECTION, EMULSION INTRAMUSCULAR; INTRAVENOUS
Status: DISPENSED
Start: 2022-02-16

## (undated) RX ORDER — LIDOCAINE HYDROCHLORIDE AND EPINEPHRINE 10; 10 MG/ML; UG/ML
INJECTION, SOLUTION INFILTRATION; PERINEURAL
Status: DISPENSED
Start: 2022-02-16